# Patient Record
Sex: FEMALE | Race: WHITE | Employment: OTHER | URBAN - METROPOLITAN AREA
[De-identification: names, ages, dates, MRNs, and addresses within clinical notes are randomized per-mention and may not be internally consistent; named-entity substitution may affect disease eponyms.]

---

## 2018-07-25 ENCOUNTER — HOSPITAL ENCOUNTER (OUTPATIENT)
Dept: WOUND CARE | Age: 83
Discharge: HOME | End: 2018-07-25
Payer: MEDICARE

## 2018-07-25 VITALS
HEIGHT: 61 IN | RESPIRATION RATE: 18 BRPM | WEIGHT: 172.2 LBS | BODY MASS INDEX: 32.51 KG/M2 | DIASTOLIC BLOOD PRESSURE: 75 MMHG | TEMPERATURE: 97.7 F | HEART RATE: 112 BPM | SYSTOLIC BLOOD PRESSURE: 113 MMHG

## 2018-07-25 DIAGNOSIS — S30.810A ABRASION OF BUTTOCK, INITIAL ENCOUNTER: ICD-10-CM

## 2018-07-25 PROCEDURE — 99202 OFFICE O/P NEW SF 15 MIN: CPT

## 2018-07-25 RX ORDER — CLOTRIMAZOLE AND BETAMETHASONE DIPROPIONATE 10; .64 MG/G; MG/G
CREAM TOPICAL
Qty: 15 G | Refills: 1 | Status: SHIPPED | OUTPATIENT
Start: 2018-07-25 | End: 2020-01-01

## 2018-07-25 RX ORDER — ACETAMINOPHEN 160 MG
TABLET,DISINTEGRATING ORAL
Status: ON HOLD | COMMUNITY
End: 2020-01-01

## 2018-07-25 RX ORDER — WARFARIN SODIUM 5 MG/1
5 TABLET ORAL
Status: ON HOLD | COMMUNITY
Start: 2017-07-27 | End: 2020-01-01 | Stop reason: HOSPADM

## 2018-07-25 RX ORDER — DILTIAZEM HYDROCHLORIDE 240 MG/1
CAPSULE, COATED, EXTENDED RELEASE ORAL
Status: ON HOLD | COMMUNITY
Start: 2018-06-11 | End: 2020-01-01 | Stop reason: HOSPADM

## 2018-07-25 RX ORDER — TRAMADOL HYDROCHLORIDE 50 MG/1
TABLET ORAL
COMMUNITY
Start: 2018-06-11 | End: 2018-09-09

## 2018-07-25 RX ORDER — ALPRAZOLAM 0.5 MG/1
0.5 TABLET ORAL NIGHTLY PRN
COMMUNITY
End: 2020-01-01

## 2018-07-25 NOTE — PROGRESS NOTES
Ramya Bon[facility]   Progress Note and Procedure Note      Douglas Romero  MEDICAL RECORD NUMBER:  [id number]  AGE: 80 y.o. GENDER: female  : 1933  EPISODE DATE:  2018    Subjective:     Chief Complaint   Patient presents with    Wound Check     coccyx wound        HISTORY of PRESENT ILLNESS HPI     Douglas Romero is a 80 y.o. female who presents today  for wound/ulcer evaluation. History of Wound Context: pressure ulcer  Wound/Ulcer Pain Timing/Severity: none  Quality of pain: N/A  Severity:  0 / 10   Modifying Factors: None  Associated Signs/Symptoms: edema  Superficial abration    Ulcer Identification:  Ulcer Type: pressure  Contributing Factors: shear force    Wound: N/A        PAST MEDICAL HISTORY        Diagnosis Date    Arthritis     Asthma     History of blood transfusion     Pneumonia        PAST SURGICAL HISTORY    Past Surgical History:   Procedure Laterality Date    COLONOSCOPY      JOINT REPLACEMENT Bilateral     bilateral femur replacements       FAMILY HISTORY    Family History   Problem Relation Age of Onset    High Blood Pressure Mother     Osteoporosis Mother     Other Brother        SOCIAL HISTORY    Social History   Substance Use Topics    Smoking status: Never Smoker    Smokeless tobacco: Never Used    Alcohol use Yes      Comment: occasional       ALLERGIES    Allergies   Allergen Reactions    Carbamazepine And Analogs     Codeine        MEDICATIONS    No current outpatient prescriptions on file prior to encounter. No current facility-administered medications on file prior to encounter. REVIEW OF SYSTEMS    Pertinent items are noted in HPI.     Objective:      /75   Pulse 112   Temp 97.7 °F (36.5 °C) (Temporal)   Resp 18   Ht 5' 1\" (1.549 m)   Wt 172 lb 3.2 oz (78.1 kg)   BMI 32.54 kg/m²     Wt Readings from Last 3 Encounters:   18 172 lb 3.2 oz (78.1 kg)       PHYSICAL EXAM    Patient awake, alert and oriented x3

## 2018-08-15 ENCOUNTER — HOSPITAL ENCOUNTER (OUTPATIENT)
Dept: WOUND CARE | Age: 83
Discharge: HOME | End: 2018-08-15
Payer: MEDICARE

## 2018-08-15 VITALS
BODY MASS INDEX: 32.47 KG/M2 | RESPIRATION RATE: 16 BRPM | HEART RATE: 87 BPM | DIASTOLIC BLOOD PRESSURE: 69 MMHG | HEIGHT: 61 IN | TEMPERATURE: 98.6 F | WEIGHT: 172 LBS | SYSTOLIC BLOOD PRESSURE: 105 MMHG

## 2018-08-15 PROCEDURE — 99212 OFFICE O/P EST SF 10 MIN: CPT

## 2018-08-15 RX ORDER — CLOTRIMAZOLE AND BETAMETHASONE DIPROPIONATE 10; .64 MG/G; MG/G
CREAM TOPICAL
Qty: 15 G | Refills: 0 | Status: ON HOLD | OUTPATIENT
Start: 2018-08-15 | End: 2020-01-01 | Stop reason: HOSPADM

## 2018-08-15 NOTE — PROGRESS NOTES
[address] [facility] Physician Billing Sheet. Ash Womack  AGE: 80 y.o.    GENDER: female  : 1933  TODAY'S DATE:  8/15/2018    ICD-10 CODES  Active Hospital Problems    Diagnosis Date Noted    Abrasion of buttock [S30.810A] 2018       PHYSICIAN PROCEDURES    CPT CODE  40006      Electronically signed by Angie Prescott MD on 8/15/2018 at 3:07 PM

## 2018-08-15 NOTE — PROGRESS NOTES
Ramya Bon[facility]   Progress Note and Procedure Note      Ernesto Andino  MEDICAL RECORD NUMBER:  [id number]  AGE: 80 y.o. GENDER: female  : 1933  EPISODE DATE:  8/15/2018    Subjective:     Chief Complaint   Patient presents with    Wound Check     buttocks        HISTORY of PRESENT ILLNESS HPI     Ernesto Andino is a 80 y.o. female who presents today  for wound/ulcer evaluation. History of Wound Context: non-healing/non-surgical ulcer  Wound/Ulcer Pain Timing/Severity: none  Quality of pain: N/A  Severity:  0 / 10   Modifying Factors: None  Associated Signs/Symptoms: none  Wound healed    Ulcer Identification:  Ulcer Type: non-healing/non-surgical  Contributing Factors: none    Wound: N/A        PAST MEDICAL HISTORY        Diagnosis Date    Arthritis     Asthma     History of blood transfusion     Pneumonia        PAST SURGICAL HISTORY    Past Surgical History:   Procedure Laterality Date    COLONOSCOPY      JOINT REPLACEMENT Bilateral     bilateral femur replacements       FAMILY HISTORY    Family History   Problem Relation Age of Onset    High Blood Pressure Mother     Osteoporosis Mother     Other Brother        SOCIAL HISTORY    Social History   Substance Use Topics    Smoking status: Never Smoker    Smokeless tobacco: Never Used    Alcohol use Yes      Comment: occasional       ALLERGIES    Allergies   Allergen Reactions    Carbamazepine And Analogs     Codeine        MEDICATIONS    Current Outpatient Prescriptions on File Prior to Encounter   Medication Sig Dispense Refill    clotrimazole-betamethasone (LOTRISONE) 1-0.05 % cream Apply topically 2 times daily. 15 g 1    diltiazem (CARTIA XT) 240 MG extended release capsule TAKE ONE CAPSULE BY MOUTH EVERY DAY.  DISCONTINUE 120MG CAPSULES      fluticasone-salmeterol (ADVAIR DISKUS) 250-50 MCG/DOSE AEPB Inhale 1 puff into the lungs      traMADol (ULTRAM) 50 MG tablet TAKE 1 TABLET BY MOUTH EVERY 8 HOURS AS NEEDED Wound healed. Will be discharged          Treatment Note please see attached Discharge Instructions    In my professional opinion this patient would benefit from HBO Therapy: No    Written patient dismissal instructions given to patient and signed by patient or POA. Thank you for choosing [facility] and allowing us to help heal your wounds. If you should have any questions after your visit, please call [phone]. Discharge Instructions       Wound Clinic Physician Orders and Discharge [address] [facility]  [address]   [place], [address] [facility]  Telephone: [phone]    NAME:  Martín Garcia OF BIRTH:  7/13/1933  MEDICAL RECORD NUMBER:  [id number]  DATE:  8/15/2018    Congratulations!! You have completed your treatment. 1. Return to your Primary Care Physician for all your health issues. 2. Resume your ordinary activities as tolerated. 3. Take your medications as prescribed by your primary care physician. 4. Check your skin daily for cracks, bruises, sores, or dryness. Use a moisturizer as needed. 5. Clean and dry your skin, using mild soap Dove and warm water (not hot). 6. Avoid alcohol and caffeine and do not smoke. 7. Maintain a nutritious diet. 8. Avoid pressure on your wound site. Keep your legs elevated above the level of the heart whenever possible. 9. Apply Lotrisone cream to affected area 2 times a day until healed. THANK YOU FOR ALLOWING US TO SERVE YOU.  PLEASE CALL IF YOU DEVELOP ANOTHER WOUND. [phone]                                Electronically signed by Haley Coon MD on 8/15/2018 at 2:57 PM

## 2019-09-18 ENCOUNTER — APPOINTMENT (OUTPATIENT)
Dept: GENERAL RADIOLOGY | Age: 84
End: 2019-09-18
Payer: MEDICARE

## 2019-09-18 ENCOUNTER — HOSPITAL ENCOUNTER (EMERGENCY)
Age: 84
Discharge: HOME | End: 2019-09-18
Payer: MEDICARE

## 2019-09-18 VITALS
DIASTOLIC BLOOD PRESSURE: 68 MMHG | HEIGHT: 61 IN | HEART RATE: 98 BPM | RESPIRATION RATE: 18 BRPM | WEIGHT: 156 LBS | BODY MASS INDEX: 29.45 KG/M2 | SYSTOLIC BLOOD PRESSURE: 124 MMHG | OXYGEN SATURATION: 100 % | TEMPERATURE: 98.6 F

## 2019-09-18 DIAGNOSIS — R09.89 PULMONARY VASCULAR CONGESTION: Primary | ICD-10-CM

## 2019-09-18 DIAGNOSIS — M54.50 ACUTE BILATERAL LOW BACK PAIN WITHOUT SCIATICA: ICD-10-CM

## 2019-09-18 LAB
ALBUMIN SERPL-MCNC: 3.6 G/DL (ref 3.5–4.6)
ALP BLD-CCNC: 83 U/L (ref 40–130)
ALT SERPL-CCNC: 6 U/L (ref 0–33)
ANION GAP SERPL CALCULATED.3IONS-SCNC: 15 MEQ/L (ref 9–15)
APTT: 43 SEC (ref 24.4–36.8)
AST SERPL-CCNC: 19 U/L (ref 0–35)
BACTERIA: ABNORMAL /HPF
BASOPHILS ABSOLUTE: 0 K/UL (ref 0–0.2)
BASOPHILS RELATIVE PERCENT: 0.8 %
BILIRUB SERPL-MCNC: 0.7 MG/DL (ref 0.2–0.7)
BILIRUBIN URINE: NEGATIVE
BLOOD, URINE: NEGATIVE
BUN BLDV-MCNC: 15 MG/DL (ref 8–23)
CALCIUM SERPL-MCNC: 9 MG/DL (ref 8.5–9.9)
CHLORIDE BLD-SCNC: 101 MEQ/L (ref 95–107)
CLARITY: ABNORMAL
CO2: 25 MEQ/L (ref 20–31)
COLOR: ABNORMAL
CREAT SERPL-MCNC: 0.71 MG/DL (ref 0.5–0.9)
CRYSTALS, UA: ABNORMAL
EKG ATRIAL RATE: 96 BPM
EKG P-R INTERVAL: 190 MS
EKG Q-T INTERVAL: 300 MS
EKG QRS DURATION: 74 MS
EKG QTC CALCULATION (BAZETT): 379 MS
EKG R AXIS: -21 DEGREES
EKG T AXIS: -3 DEGREES
EKG VENTRICULAR RATE: 96 BPM
EOSINOPHILS ABSOLUTE: 0.1 K/UL (ref 0–0.7)
EOSINOPHILS RELATIVE PERCENT: 1.1 %
EPITHELIAL CELLS, UA: ABNORMAL /HPF (ref 0–5)
GFR AFRICAN AMERICAN: >60
GFR NON-AFRICAN AMERICAN: >60
GLOBULIN: 4.5 G/DL (ref 2.3–3.5)
GLUCOSE BLD-MCNC: 96 MG/DL (ref 70–99)
GLUCOSE URINE: NEGATIVE MG/DL
HCT VFR BLD CALC: 41 % (ref 37–47)
HEMOGLOBIN: 13.7 G/DL (ref 12–16)
INR BLD: 3.1
KETONES, URINE: ABNORMAL MG/DL
LEUKOCYTE ESTERASE, URINE: ABNORMAL
LYMPHOCYTES ABSOLUTE: 0.9 K/UL (ref 1–4.8)
LYMPHOCYTES RELATIVE PERCENT: 14.4 %
MCH RBC QN AUTO: 31 PG (ref 27–31.3)
MCHC RBC AUTO-ENTMCNC: 33.3 % (ref 33–37)
MCV RBC AUTO: 93.1 FL (ref 82–100)
MONOCYTES ABSOLUTE: 0.7 K/UL (ref 0.2–0.8)
MONOCYTES RELATIVE PERCENT: 11.7 %
MUCUS: PRESENT
NEUTROPHILS ABSOLUTE: 4.3 K/UL (ref 1.4–6.5)
NEUTROPHILS RELATIVE PERCENT: 72 %
NITRITE, URINE: POSITIVE
PDW BLD-RTO: 14.3 % (ref 11.5–14.5)
PH UA: 5.5 (ref 5–9)
PLATELET # BLD: 235 K/UL (ref 130–400)
POTASSIUM SERPL-SCNC: 4.1 MEQ/L (ref 3.4–4.9)
PRO-BNP: 784 PG/ML
PROTEIN UA: 30 MG/DL
PROTHROMBIN TIME: 33.1 SEC (ref 12.3–14.9)
RBC # BLD: 4.4 M/UL (ref 4.2–5.4)
REASON FOR REJECTION: NORMAL
REJECTED TEST: NORMAL
SODIUM BLD-SCNC: 141 MEQ/L (ref 135–144)
SPECIFIC GRAVITY UA: 1.03 (ref 1–1.03)
TOTAL CK: 37 U/L (ref 0–170)
TOTAL PROTEIN: 8.1 G/DL (ref 6.3–8)
TROPONIN: <0.01 NG/ML (ref 0–0.01)
URINE REFLEX TO CULTURE: YES
UROBILINOGEN, URINE: 1 E.U./DL
WBC # BLD: 6 K/UL (ref 4.8–10.8)
WBC UA: ABNORMAL /HPF (ref 0–5)

## 2019-09-18 PROCEDURE — 99283 EMERGENCY DEPT VISIT LOW MDM: CPT

## 2019-09-18 PROCEDURE — 82550 ASSAY OF CK (CPK): CPT

## 2019-09-18 PROCEDURE — 96374 THER/PROPH/DIAG INJ IV PUSH: CPT

## 2019-09-18 PROCEDURE — 93005 ELECTROCARDIOGRAM TRACING: CPT | Performed by: PHYSICIAN ASSISTANT

## 2019-09-18 PROCEDURE — 84484 ASSAY OF TROPONIN QUANT: CPT

## 2019-09-18 PROCEDURE — 71045 X-RAY EXAM CHEST 1 VIEW: CPT

## 2019-09-18 PROCEDURE — 36415 COLL VENOUS BLD VENIPUNCTURE: CPT

## 2019-09-18 PROCEDURE — 81001 URINALYSIS AUTO W/SCOPE: CPT

## 2019-09-18 PROCEDURE — 80053 COMPREHEN METABOLIC PANEL: CPT

## 2019-09-18 PROCEDURE — 87086 URINE CULTURE/COLONY COUNT: CPT

## 2019-09-18 PROCEDURE — 87186 SC STD MICRODIL/AGAR DIL: CPT

## 2019-09-18 PROCEDURE — 85025 COMPLETE CBC W/AUTO DIFF WBC: CPT

## 2019-09-18 PROCEDURE — 85730 THROMBOPLASTIN TIME PARTIAL: CPT

## 2019-09-18 PROCEDURE — 6360000002 HC RX W HCPCS: Performed by: PHYSICIAN ASSISTANT

## 2019-09-18 PROCEDURE — 85610 PROTHROMBIN TIME: CPT

## 2019-09-18 PROCEDURE — 83880 ASSAY OF NATRIURETIC PEPTIDE: CPT

## 2019-09-18 PROCEDURE — 87077 CULTURE AEROBIC IDENTIFY: CPT

## 2019-09-18 RX ORDER — FUROSEMIDE 10 MG/ML
40 INJECTION INTRAMUSCULAR; INTRAVENOUS ONCE
Status: COMPLETED | OUTPATIENT
Start: 2019-09-18 | End: 2019-09-18

## 2019-09-18 RX ORDER — FUROSEMIDE 20 MG/1
20 TABLET ORAL DAILY
Qty: 4 TABLET | Refills: 0 | Status: ON HOLD | OUTPATIENT
Start: 2019-09-18 | End: 2020-01-01 | Stop reason: HOSPADM

## 2019-09-18 RX ORDER — HYDROCODONE BITARTRATE AND ACETAMINOPHEN 5; 325 MG/1; MG/1
1 TABLET ORAL EVERY 6 HOURS PRN
Qty: 10 TABLET | Refills: 0 | Status: SHIPPED | OUTPATIENT
Start: 2019-09-18 | End: 2019-09-21

## 2019-09-18 RX ADMIN — FUROSEMIDE 40 MG: 10 INJECTION, SOLUTION INTRAMUSCULAR; INTRAVENOUS at 16:33

## 2019-09-18 ASSESSMENT — PAIN SCALES - GENERAL: PAINLEVEL_OUTOF10: 8

## 2019-09-18 ASSESSMENT — ENCOUNTER SYMPTOMS
SORE THROAT: 0
NAUSEA: 0
RHINORRHEA: 0
ABDOMINAL DISTENTION: 0
SHORTNESS OF BREATH: 1
EYE DISCHARGE: 0
STRIDOR: 0
DIARRHEA: 0
CHOKING: 0
CONSTIPATION: 0
ABDOMINAL PAIN: 0
COUGH: 0
COLOR CHANGE: 0
VOMITING: 0

## 2019-09-18 ASSESSMENT — PAIN DESCRIPTION - LOCATION: LOCATION: BACK

## 2019-09-18 ASSESSMENT — PAIN DESCRIPTION - PAIN TYPE: TYPE: ACUTE PAIN

## 2019-09-18 ASSESSMENT — PAIN DESCRIPTION - ONSET: ONSET: ON-GOING

## 2019-09-18 ASSESSMENT — PAIN DESCRIPTION - DESCRIPTORS: DESCRIPTORS: ACHING;CRAMPING

## 2019-09-18 ASSESSMENT — PAIN DESCRIPTION - FREQUENCY: FREQUENCY: CONTINUOUS

## 2019-09-18 ASSESSMENT — PAIN DESCRIPTION - PROGRESSION: CLINICAL_PROGRESSION: GRADUALLY WORSENING

## 2019-09-18 ASSESSMENT — PAIN DESCRIPTION - ORIENTATION: ORIENTATION: RIGHT;LEFT

## 2019-09-18 NOTE — ED TRIAGE NOTES
Patient arrived from home with complaint of back pain x1 weeks. Patient A&OX3. Skin pink, warm, and dry. Patient complaint of sob with amb. No distress noted. msp intact.

## 2019-09-20 PROCEDURE — 93010 ELECTROCARDIOGRAM REPORT: CPT | Performed by: INTERNAL MEDICINE

## 2019-09-21 LAB
ORGANISM: ABNORMAL
URINE CULTURE, ROUTINE: ABNORMAL

## 2019-10-07 LAB
LV EF: 66 %
LVEF MODALITY: NORMAL

## 2020-01-01 ENCOUNTER — APPOINTMENT (OUTPATIENT)
Dept: CT IMAGING | Age: 85
DRG: 308 | End: 2020-01-01
Payer: MEDICARE

## 2020-01-01 ENCOUNTER — ANESTHESIA EVENT (OUTPATIENT)
Dept: ENDOSCOPY | Age: 85
DRG: 308 | End: 2020-01-01
Payer: MEDICARE

## 2020-01-01 ENCOUNTER — APPOINTMENT (OUTPATIENT)
Dept: GENERAL RADIOLOGY | Age: 85
DRG: 308 | End: 2020-01-01
Payer: MEDICARE

## 2020-01-01 ENCOUNTER — APPOINTMENT (OUTPATIENT)
Dept: ULTRASOUND IMAGING | Age: 85
DRG: 308 | End: 2020-01-01
Payer: MEDICARE

## 2020-01-01 ENCOUNTER — HOSPITAL ENCOUNTER (EMERGENCY)
Age: 85
Discharge: HOME | End: 2020-02-17
Payer: MEDICARE

## 2020-01-01 ENCOUNTER — APPOINTMENT (OUTPATIENT)
Dept: MRI IMAGING | Age: 85
DRG: 308 | End: 2020-01-01
Payer: MEDICARE

## 2020-01-01 ENCOUNTER — APPOINTMENT (OUTPATIENT)
Dept: CARDIAC CATH/INVASIVE PROCEDURES | Age: 85
DRG: 308 | End: 2020-01-01
Payer: MEDICARE

## 2020-01-01 ENCOUNTER — HOSPITAL ENCOUNTER (INPATIENT)
Age: 85
LOS: 9 days | Discharge: HOSPICE | DRG: 308 | End: 2020-12-01
Attending: INTERNAL MEDICINE | Admitting: INTERNAL MEDICINE
Payer: MEDICARE

## 2020-01-01 ENCOUNTER — APPOINTMENT (OUTPATIENT)
Dept: CT IMAGING | Age: 85
DRG: 871 | End: 2020-01-01
Payer: MEDICARE

## 2020-01-01 ENCOUNTER — APPOINTMENT (OUTPATIENT)
Dept: GENERAL RADIOLOGY | Age: 85
DRG: 871 | End: 2020-01-01
Payer: MEDICARE

## 2020-01-01 ENCOUNTER — APPOINTMENT (OUTPATIENT)
Dept: ULTRASOUND IMAGING | Age: 85
DRG: 871 | End: 2020-01-01
Payer: MEDICARE

## 2020-01-01 ENCOUNTER — ANESTHESIA (OUTPATIENT)
Dept: ENDOSCOPY | Age: 85
DRG: 308 | End: 2020-01-01
Payer: MEDICARE

## 2020-01-01 ENCOUNTER — HOSPITAL ENCOUNTER (INPATIENT)
Age: 85
LOS: 3 days | Discharge: HOME | DRG: 871 | End: 2020-01-15
Attending: FAMILY MEDICINE | Admitting: INTERNAL MEDICINE
Payer: MEDICARE

## 2020-01-01 VITALS
RESPIRATION RATE: 16 BRPM | OXYGEN SATURATION: 95 % | TEMPERATURE: 97.9 F | BODY MASS INDEX: 28.52 KG/M2 | HEART RATE: 101 BPM | WEIGHT: 155 LBS | HEIGHT: 62 IN | DIASTOLIC BLOOD PRESSURE: 60 MMHG | SYSTOLIC BLOOD PRESSURE: 91 MMHG

## 2020-01-01 VITALS
DIASTOLIC BLOOD PRESSURE: 58 MMHG | HEIGHT: 64 IN | WEIGHT: 124.6 LBS | SYSTOLIC BLOOD PRESSURE: 92 MMHG | TEMPERATURE: 97.4 F | BODY MASS INDEX: 21.27 KG/M2 | HEART RATE: 75 BPM | RESPIRATION RATE: 14 BRPM | OXYGEN SATURATION: 95 %

## 2020-01-01 VITALS
WEIGHT: 163 LBS | SYSTOLIC BLOOD PRESSURE: 116 MMHG | TEMPERATURE: 98.1 F | HEIGHT: 61 IN | BODY MASS INDEX: 30.78 KG/M2 | DIASTOLIC BLOOD PRESSURE: 84 MMHG | HEART RATE: 45 BPM | RESPIRATION RATE: 18 BRPM | OXYGEN SATURATION: 90 %

## 2020-01-01 LAB
ABO/RH: NORMAL
ALBUMIN SERPL-MCNC: 2.6 G/DL (ref 3.5–4.6)
ALBUMIN SERPL-MCNC: 2.7 G/DL (ref 3.5–4.6)
ALBUMIN SERPL-MCNC: 2.7 G/DL (ref 3.5–4.6)
ALBUMIN SERPL-MCNC: 2.8 G/DL (ref 3.5–4.6)
ALBUMIN SERPL-MCNC: 3 G/DL (ref 3.5–4.6)
ALBUMIN SERPL-MCNC: 3 G/DL (ref 3.5–4.6)
ALBUMIN SERPL-MCNC: 3.1 G/DL (ref 3.5–4.6)
ALBUMIN SERPL-MCNC: 3.7 G/DL (ref 3.5–4.6)
ALBUMIN SERPL-MCNC: 4.2 G/DL (ref 3.5–4.6)
ALBUMIN SERPL-MCNC: 4.3 G/DL (ref 3.5–4.6)
ALBUMIN SERPL-MCNC: 4.7 G/DL (ref 3.5–4.6)
ALBUMIN SERPL-MCNC: 4.9 G/DL (ref 3.5–4.6)
ALP BLD-CCNC: 119 U/L (ref 40–130)
ALP BLD-CCNC: 122 U/L (ref 40–130)
ALP BLD-CCNC: 124 U/L (ref 40–130)
ALP BLD-CCNC: 126 U/L (ref 40–130)
ALP BLD-CCNC: 153 U/L (ref 40–130)
ALP BLD-CCNC: 229 U/L (ref 40–130)
ALP BLD-CCNC: 50 U/L (ref 40–130)
ALP BLD-CCNC: 58 U/L (ref 40–130)
ALP BLD-CCNC: 64 U/L (ref 40–130)
ALP BLD-CCNC: 66 U/L (ref 40–130)
ALP BLD-CCNC: 73 U/L (ref 40–130)
ALP BLD-CCNC: 96 U/L (ref 40–130)
ALT SERPL-CCNC: 101 U/L (ref 0–33)
ALT SERPL-CCNC: 128 U/L (ref 0–33)
ALT SERPL-CCNC: 135 U/L (ref 0–33)
ALT SERPL-CCNC: 15 U/L (ref 0–33)
ALT SERPL-CCNC: 23 U/L (ref 0–33)
ALT SERPL-CCNC: 39 U/L (ref 0–33)
ALT SERPL-CCNC: 57 U/L (ref 0–33)
ALT SERPL-CCNC: 57 U/L (ref 0–33)
ALT SERPL-CCNC: 68 U/L (ref 0–33)
ALT SERPL-CCNC: 8 U/L (ref 0–33)
ALT SERPL-CCNC: 9 U/L (ref 0–33)
ALT SERPL-CCNC: <5 U/L (ref 0–33)
ANION GAP SERPL CALCULATED.3IONS-SCNC: 10 MEQ/L (ref 9–15)
ANION GAP SERPL CALCULATED.3IONS-SCNC: 11 MEQ/L (ref 9–15)
ANION GAP SERPL CALCULATED.3IONS-SCNC: 12 MEQ/L (ref 9–15)
ANION GAP SERPL CALCULATED.3IONS-SCNC: 12 MEQ/L (ref 9–15)
ANION GAP SERPL CALCULATED.3IONS-SCNC: 13 MEQ/L (ref 9–15)
ANION GAP SERPL CALCULATED.3IONS-SCNC: 15 MEQ/L (ref 9–15)
ANION GAP SERPL CALCULATED.3IONS-SCNC: 16 MEQ/L (ref 9–15)
ANION GAP SERPL CALCULATED.3IONS-SCNC: 7 MEQ/L (ref 9–15)
ANION GAP SERPL CALCULATED.3IONS-SCNC: 7 MEQ/L (ref 9–15)
ANION GAP SERPL CALCULATED.3IONS-SCNC: 8 MEQ/L (ref 9–15)
ANION GAP SERPL CALCULATED.3IONS-SCNC: 9 MEQ/L (ref 9–15)
ANISOCYTOSIS: ABNORMAL
ANISOCYTOSIS: ABNORMAL
ANTIBODY SCREEN: NORMAL
APTT: 49 SEC (ref 24.4–36.8)
AST SERPL-CCNC: 11 U/L (ref 0–35)
AST SERPL-CCNC: 13 U/L (ref 0–35)
AST SERPL-CCNC: 154 U/L (ref 0–35)
AST SERPL-CCNC: 17 U/L (ref 0–35)
AST SERPL-CCNC: 198 U/L (ref 0–35)
AST SERPL-CCNC: 21 U/L (ref 0–35)
AST SERPL-CCNC: 285 U/L (ref 0–35)
AST SERPL-CCNC: 29 U/L (ref 0–35)
AST SERPL-CCNC: 37 U/L (ref 0–35)
AST SERPL-CCNC: 598 U/L (ref 0–35)
AST SERPL-CCNC: 64 U/L (ref 0–35)
AST SERPL-CCNC: 79 U/L (ref 0–35)
BACTERIA: ABNORMAL /HPF
BACTERIA: NEGATIVE /HPF
BANDED NEUTROPHILS RELATIVE PERCENT: 11 % (ref 5–11)
BASE EXCESS ARTERIAL: 1 (ref -3–3)
BASOPHILS ABSOLUTE: 0 K/UL (ref 0–0.2)
BASOPHILS ABSOLUTE: 0.1 K/UL (ref 0–0.2)
BASOPHILS ABSOLUTE: 0.1 K/UL (ref 0–0.2)
BASOPHILS RELATIVE PERCENT: 0 %
BASOPHILS RELATIVE PERCENT: 0.1 %
BASOPHILS RELATIVE PERCENT: 0.2 %
BASOPHILS RELATIVE PERCENT: 0.5 %
BASOPHILS RELATIVE PERCENT: 0.6 %
BASOPHILS RELATIVE PERCENT: 0.9 %
BASOPHILS RELATIVE PERCENT: 1 %
BILIRUB SERPL-MCNC: 0.6 MG/DL (ref 0.2–0.7)
BILIRUB SERPL-MCNC: 0.7 MG/DL (ref 0.2–0.7)
BILIRUB SERPL-MCNC: 0.9 MG/DL (ref 0.2–0.7)
BILIRUB SERPL-MCNC: 1 MG/DL (ref 0.2–0.7)
BILIRUB SERPL-MCNC: 1 MG/DL (ref 0.2–0.7)
BILIRUB SERPL-MCNC: 1.4 MG/DL (ref 0.2–0.7)
BILIRUB SERPL-MCNC: 1.5 MG/DL (ref 0.2–0.7)
BILIRUB SERPL-MCNC: 1.5 MG/DL (ref 0.2–0.7)
BILIRUB SERPL-MCNC: 1.6 MG/DL (ref 0.2–0.7)
BILIRUB SERPL-MCNC: 1.7 MG/DL (ref 0.2–0.7)
BILIRUB SERPL-MCNC: 2.9 MG/DL (ref 0.2–0.7)
BILIRUB SERPL-MCNC: 4.9 MG/DL (ref 0.2–0.7)
BILIRUBIN DIRECT: 0.4 MG/DL (ref 0–0.4)
BILIRUBIN DIRECT: 0.6 MG/DL (ref 0–0.4)
BILIRUBIN URINE: ABNORMAL
BILIRUBIN URINE: NEGATIVE
BILIRUBIN, INDIRECT: 0.2 MG/DL (ref 0–0.6)
BILIRUBIN, INDIRECT: 0.3 MG/DL (ref 0–0.6)
BLOOD CULTURE, ROUTINE: NORMAL
BLOOD, URINE: ABNORMAL
BLOOD, URINE: NEGATIVE
BUN BLDV-MCNC: 15 MG/DL (ref 8–23)
BUN BLDV-MCNC: 15 MG/DL (ref 8–23)
BUN BLDV-MCNC: 16 MG/DL (ref 8–23)
BUN BLDV-MCNC: 17 MG/DL (ref 8–23)
BUN BLDV-MCNC: 20 MG/DL (ref 8–23)
BUN BLDV-MCNC: 22 MG/DL (ref 8–23)
BUN BLDV-MCNC: 22 MG/DL (ref 8–23)
BUN BLDV-MCNC: 24 MG/DL (ref 8–23)
BUN BLDV-MCNC: 24 MG/DL (ref 8–23)
BUN BLDV-MCNC: 27 MG/DL (ref 8–23)
BUN BLDV-MCNC: 28 MG/DL (ref 8–23)
BUN BLDV-MCNC: 28 MG/DL (ref 8–23)
BUN BLDV-MCNC: 44 MG/DL (ref 8–23)
C-REACTIVE PROTEIN, HIGH SENSITIVITY: 108.6 MG/L (ref 0–5)
C-REACTIVE PROTEIN, HIGH SENSITIVITY: 203.9 MG/L (ref 0–5)
C-REACTIVE PROTEIN, HIGH SENSITIVITY: 218.7 MG/L (ref 0–5)
C-REACTIVE PROTEIN, HIGH SENSITIVITY: 40.8 MG/L (ref 0–5)
CALCIUM IONIZED: 1.34 MMOL/L (ref 1.12–1.32)
CALCIUM SERPL-MCNC: 10.6 MG/DL (ref 8.5–9.9)
CALCIUM SERPL-MCNC: 8.2 MG/DL (ref 8.5–9.9)
CALCIUM SERPL-MCNC: 8.4 MG/DL (ref 8.5–9.9)
CALCIUM SERPL-MCNC: 8.5 MG/DL (ref 8.5–9.9)
CALCIUM SERPL-MCNC: 8.5 MG/DL (ref 8.5–9.9)
CALCIUM SERPL-MCNC: 8.6 MG/DL (ref 8.5–9.9)
CALCIUM SERPL-MCNC: 8.7 MG/DL (ref 8.5–9.9)
CALCIUM SERPL-MCNC: 8.9 MG/DL (ref 8.5–9.9)
CALCIUM SERPL-MCNC: 8.9 MG/DL (ref 8.5–9.9)
CALCIUM SERPL-MCNC: 9.1 MG/DL (ref 8.5–9.9)
CALCIUM SERPL-MCNC: 9.2 MG/DL (ref 8.5–9.9)
CALCIUM SERPL-MCNC: 9.4 MG/DL (ref 8.5–9.9)
CALCIUM SERPL-MCNC: 9.8 MG/DL (ref 8.5–9.9)
CHLORIDE BLD-SCNC: 100 MEQ/L (ref 95–107)
CHLORIDE BLD-SCNC: 100 MEQ/L (ref 95–107)
CHLORIDE BLD-SCNC: 103 MEQ/L (ref 95–107)
CHLORIDE BLD-SCNC: 103 MEQ/L (ref 95–107)
CHLORIDE BLD-SCNC: 106 MEQ/L (ref 95–107)
CHLORIDE BLD-SCNC: 107 MEQ/L (ref 95–107)
CHLORIDE BLD-SCNC: 108 MEQ/L (ref 95–107)
CHLORIDE BLD-SCNC: 108 MEQ/L (ref 95–107)
CHLORIDE BLD-SCNC: 109 MEQ/L (ref 95–107)
CHLORIDE BLD-SCNC: 111 MEQ/L (ref 95–107)
CHLORIDE BLD-SCNC: 99 MEQ/L (ref 95–107)
CLARITY: ABNORMAL
CLARITY: CLEAR
CO2: 20 MEQ/L (ref 20–31)
CO2: 22 MEQ/L (ref 20–31)
CO2: 23 MEQ/L (ref 20–31)
CO2: 23 MEQ/L (ref 20–31)
CO2: 24 MEQ/L (ref 20–31)
CO2: 24 MEQ/L (ref 20–31)
CO2: 26 MEQ/L (ref 20–31)
CO2: 26 MEQ/L (ref 20–31)
CO2: 27 MEQ/L (ref 20–31)
CO2: 29 MEQ/L (ref 20–31)
COLOR: ABNORMAL
COLOR: YELLOW
CREAT SERPL-MCNC: 0.55 MG/DL (ref 0.5–0.9)
CREAT SERPL-MCNC: 0.59 MG/DL (ref 0.5–0.9)
CREAT SERPL-MCNC: 0.64 MG/DL (ref 0.5–0.9)
CREAT SERPL-MCNC: 0.67 MG/DL (ref 0.5–0.9)
CREAT SERPL-MCNC: 0.71 MG/DL (ref 0.5–0.9)
CREAT SERPL-MCNC: 0.72 MG/DL (ref 0.5–0.9)
CREAT SERPL-MCNC: 0.77 MG/DL (ref 0.5–0.9)
CREAT SERPL-MCNC: 0.78 MG/DL (ref 0.5–0.9)
CREAT SERPL-MCNC: 0.78 MG/DL (ref 0.5–0.9)
CREAT SERPL-MCNC: 0.8 MG/DL (ref 0.5–0.9)
CREAT SERPL-MCNC: 0.81 MG/DL (ref 0.5–0.9)
CREAT SERPL-MCNC: 0.92 MG/DL (ref 0.5–0.9)
CREAT SERPL-MCNC: 1.05 MG/DL (ref 0.5–0.9)
CULTURE, BLOOD 2: NORMAL
EKG ATRIAL RATE: 105 BPM
EKG ATRIAL RATE: 105 BPM
EKG ATRIAL RATE: 121 BPM
EKG ATRIAL RATE: 234 BPM
EKG ATRIAL RATE: 394 BPM
EKG ATRIAL RATE: 468 BPM
EKG ATRIAL RATE: 50 BPM
EKG ATRIAL RATE: 83 BPM
EKG ATRIAL RATE: 90 BPM
EKG P AXIS: 30 DEGREES
EKG P AXIS: 32 DEGREES
EKG P AXIS: 62 DEGREES
EKG P-R INTERVAL: 142 MS
EKG P-R INTERVAL: 190 MS
EKG P-R INTERVAL: 196 MS
EKG P-R INTERVAL: 210 MS
EKG Q-T INTERVAL: 246 MS
EKG Q-T INTERVAL: 294 MS
EKG Q-T INTERVAL: 304 MS
EKG Q-T INTERVAL: 350 MS
EKG Q-T INTERVAL: 356 MS
EKG Q-T INTERVAL: 362 MS
EKG Q-T INTERVAL: 368 MS
EKG Q-T INTERVAL: 398 MS
EKG Q-T INTERVAL: 398 MS
EKG QRS DURATION: 72 MS
EKG QRS DURATION: 74 MS
EKG QRS DURATION: 74 MS
EKG QRS DURATION: 78 MS
EKG QRS DURATION: 80 MS
EKG QRS DURATION: 82 MS
EKG QRS DURATION: 94 MS
EKG QTC CALCULATION (BAZETT): 349 MS
EKG QTC CALCULATION (BAZETT): 405 MS
EKG QTC CALCULATION (BAZETT): 412 MS
EKG QTC CALCULATION (BAZETT): 425 MS
EKG QTC CALCULATION (BAZETT): 442 MS
EKG QTC CALCULATION (BAZETT): 450 MS
EKG QTC CALCULATION (BAZETT): 459 MS
EKG QTC CALCULATION (BAZETT): 497 MS
EKG QTC CALCULATION (BAZETT): 526 MS
EKG R AXIS: -17 DEGREES
EKG R AXIS: -18 DEGREES
EKG R AXIS: -18 DEGREES
EKG R AXIS: -19 DEGREES
EKG R AXIS: -20 DEGREES
EKG R AXIS: -21 DEGREES
EKG R AXIS: -23 DEGREES
EKG T AXIS: -12 DEGREES
EKG T AXIS: -24 DEGREES
EKG T AXIS: -57 DEGREES
EKG T AXIS: -76 DEGREES
EKG T AXIS: 179 DEGREES
EKG T AXIS: 180 DEGREES
EKG T AXIS: 183 DEGREES
EKG T AXIS: 195 DEGREES
EKG T AXIS: 2 DEGREES
EKG VENTRICULAR RATE: 100 BPM
EKG VENTRICULAR RATE: 105 BPM
EKG VENTRICULAR RATE: 107 BPM
EKG VENTRICULAR RATE: 118 BPM
EKG VENTRICULAR RATE: 121 BPM
EKG VENTRICULAR RATE: 83 BPM
EKG VENTRICULAR RATE: 90 BPM
EKG VENTRICULAR RATE: 94 BPM
EKG VENTRICULAR RATE: 96 BPM
EOSINOPHILS ABSOLUTE: 0 K/UL (ref 0–0.7)
EOSINOPHILS ABSOLUTE: 0.1 K/UL (ref 0–0.7)
EOSINOPHILS RELATIVE PERCENT: 0 %
EOSINOPHILS RELATIVE PERCENT: 0.1 %
EOSINOPHILS RELATIVE PERCENT: 0.1 %
EOSINOPHILS RELATIVE PERCENT: 0.2 %
EOSINOPHILS RELATIVE PERCENT: 0.3 %
EOSINOPHILS RELATIVE PERCENT: 0.6 %
EOSINOPHILS RELATIVE PERCENT: 1.1 %
EOSINOPHILS RELATIVE PERCENT: 1.2 %
EPITHELIAL CELLS, UA: ABNORMAL /HPF (ref 0–5)
EPITHELIAL CELLS, UA: NORMAL /HPF (ref 0–5)
GFR AFRICAN AMERICAN: 59.9
GFR AFRICAN AMERICAN: >60
GFR NON-AFRICAN AMERICAN: 49.5
GFR NON-AFRICAN AMERICAN: 57.7
GFR NON-AFRICAN AMERICAN: 59
GFR NON-AFRICAN AMERICAN: >60
GLOBULIN: 1.8 G/DL (ref 2.3–3.5)
GLOBULIN: 2 G/DL (ref 2.3–3.5)
GLOBULIN: 2.1 G/DL (ref 2.3–3.5)
GLOBULIN: 2.4 G/DL (ref 2.3–3.5)
GLOBULIN: 2.5 G/DL (ref 2.3–3.5)
GLOBULIN: 3.3 G/DL (ref 2.3–3.5)
GLOBULIN: 3.3 G/DL (ref 2.3–3.5)
GLOBULIN: 3.4 G/DL (ref 2.3–3.5)
GLOBULIN: 3.4 G/DL (ref 2.3–3.5)
GLOBULIN: 3.5 G/DL (ref 2.3–3.5)
GLUCOSE BLD-MCNC: 101 MG/DL (ref 70–99)
GLUCOSE BLD-MCNC: 124 MG/DL (ref 70–99)
GLUCOSE BLD-MCNC: 148 MG/DL (ref 70–99)
GLUCOSE BLD-MCNC: 62 MG/DL (ref 70–99)
GLUCOSE BLD-MCNC: 71 MG/DL (ref 70–99)
GLUCOSE BLD-MCNC: 76 MG/DL (ref 70–99)
GLUCOSE BLD-MCNC: 76 MG/DL (ref 70–99)
GLUCOSE BLD-MCNC: 81 MG/DL (ref 70–99)
GLUCOSE BLD-MCNC: 81 MG/DL (ref 70–99)
GLUCOSE BLD-MCNC: 88 MG/DL (ref 70–99)
GLUCOSE BLD-MCNC: 88 MG/DL (ref 70–99)
GLUCOSE BLD-MCNC: 90 MG/DL (ref 70–99)
GLUCOSE BLD-MCNC: 92 MG/DL (ref 70–99)
GLUCOSE BLD-MCNC: 93 MG/DL (ref 60–115)
GLUCOSE URINE: NEGATIVE MG/DL
GLUCOSE URINE: NEGATIVE MG/DL
HCO3 ARTERIAL: 28 MMOL/L (ref 21–29)
HCT VFR BLD CALC: 33.5 % (ref 37–47)
HCT VFR BLD CALC: 34 % (ref 37–47)
HCT VFR BLD CALC: 34.6 % (ref 37–47)
HCT VFR BLD CALC: 34.6 % (ref 37–47)
HCT VFR BLD CALC: 35.4 % (ref 37–47)
HCT VFR BLD CALC: 38.3 % (ref 37–47)
HCT VFR BLD CALC: 38.8 % (ref 37–47)
HCT VFR BLD CALC: 40.5 % (ref 37–47)
HCT VFR BLD CALC: 41.8 % (ref 37–47)
HCT VFR BLD CALC: 42 % (ref 37–47)
HCT VFR BLD CALC: 42.5 % (ref 37–47)
HCT VFR BLD CALC: 43.1 % (ref 37–47)
HCT VFR BLD CALC: 48.9 % (ref 37–47)
HEMOGLOBIN: 10.8 G/DL (ref 12–16)
HEMOGLOBIN: 11 G/DL (ref 12–16)
HEMOGLOBIN: 11.1 G/DL (ref 12–16)
HEMOGLOBIN: 11.1 G/DL (ref 12–16)
HEMOGLOBIN: 11.2 G/DL (ref 12–16)
HEMOGLOBIN: 12.4 GM/DL (ref 12–16)
HEMOGLOBIN: 12.6 G/DL (ref 12–16)
HEMOGLOBIN: 12.8 G/DL (ref 12–16)
HEMOGLOBIN: 13.4 G/DL (ref 12–16)
HEMOGLOBIN: 13.4 G/DL (ref 12–16)
HEMOGLOBIN: 13.8 G/DL (ref 12–16)
HEMOGLOBIN: 14.1 G/DL (ref 12–16)
HEMOGLOBIN: 14.1 G/DL (ref 12–16)
HEMOGLOBIN: 15.8 G/DL (ref 12–16)
HYALINE CASTS: ABNORMAL /HPF (ref 0–5)
HYALINE CASTS: NORMAL /HPF (ref 0–5)
HYPOCHROMIA: ABNORMAL
INFLUENZA A BY PCR: NEGATIVE
INFLUENZA B BY PCR: NEGATIVE
INR BLD: 2.4
INR BLD: 2.6
INR BLD: 2.7
INR BLD: 2.9
INR BLD: 3.3
INR BLD: 3.5
INR BLD: 3.7
INR BLD: 4
INR BLD: 4.1
INR BLD: 4.2
INR BLD: 4.9
INR BLD: 5.5
INR BLD: 5.8
INR BLD: >10
INR BLD: >10
KETONES, URINE: NEGATIVE MG/DL
KETONES, URINE: NEGATIVE MG/DL
LACTATE: 0.94 MMOL/L (ref 0.4–2)
LACTIC ACID: 1.5 MMOL/L (ref 0.5–2.2)
LACTIC ACID: 1.9 MMOL/L (ref 0.5–2.2)
LACTIC ACID: 2.8 MMOL/L (ref 0.5–2.2)
LEUKOCYTE ESTERASE, URINE: ABNORMAL
LEUKOCYTE ESTERASE, URINE: ABNORMAL
LV EF: 65 %
LVEF MODALITY: NORMAL
LYMPHOCYTES ABSOLUTE: 0.3 K/UL (ref 1–4.8)
LYMPHOCYTES ABSOLUTE: 0.5 K/UL (ref 1–4.8)
LYMPHOCYTES ABSOLUTE: 0.6 K/UL (ref 1–4.8)
LYMPHOCYTES ABSOLUTE: 0.7 K/UL (ref 1–4.8)
LYMPHOCYTES ABSOLUTE: 0.8 K/UL (ref 1–4.8)
LYMPHOCYTES ABSOLUTE: 0.9 K/UL (ref 1–4.8)
LYMPHOCYTES ABSOLUTE: 1 K/UL (ref 1–4.8)
LYMPHOCYTES ABSOLUTE: 1 K/UL (ref 1–4.8)
LYMPHOCYTES ABSOLUTE: 1.3 K/UL (ref 1–4.8)
LYMPHOCYTES ABSOLUTE: 1.4 K/UL (ref 1–4.8)
LYMPHOCYTES ABSOLUTE: 1.5 K/UL (ref 1–4.8)
LYMPHOCYTES ABSOLUTE: 1.6 K/UL (ref 1–4.8)
LYMPHOCYTES RELATIVE PERCENT: 12.3 %
LYMPHOCYTES RELATIVE PERCENT: 13 %
LYMPHOCYTES RELATIVE PERCENT: 16.9 %
LYMPHOCYTES RELATIVE PERCENT: 18.7 %
LYMPHOCYTES RELATIVE PERCENT: 18.9 %
LYMPHOCYTES RELATIVE PERCENT: 2 %
LYMPHOCYTES RELATIVE PERCENT: 21.9 %
LYMPHOCYTES RELATIVE PERCENT: 4.7 %
LYMPHOCYTES RELATIVE PERCENT: 6 %
LYMPHOCYTES RELATIVE PERCENT: 6.3 %
LYMPHOCYTES RELATIVE PERCENT: 7.7 %
LYMPHOCYTES RELATIVE PERCENT: 8.7 %
MACROCYTES: ABNORMAL
MACROCYTES: ABNORMAL
MAGNESIUM: 1.7 MG/DL (ref 1.7–2.4)
MAGNESIUM: 1.9 MG/DL (ref 1.7–2.4)
MAGNESIUM: 2 MG/DL (ref 1.7–2.4)
MAGNESIUM: 2.1 MG/DL (ref 1.7–2.4)
MAGNESIUM: 2.1 MG/DL (ref 1.7–2.4)
MCH RBC QN AUTO: 30.8 PG (ref 27–31.3)
MCH RBC QN AUTO: 30.9 PG (ref 27–31.3)
MCH RBC QN AUTO: 30.9 PG (ref 27–31.3)
MCH RBC QN AUTO: 31 PG (ref 27–31.3)
MCH RBC QN AUTO: 31.1 PG (ref 27–31.3)
MCH RBC QN AUTO: 31.2 PG (ref 27–31.3)
MCH RBC QN AUTO: 31.2 PG (ref 27–31.3)
MCH RBC QN AUTO: 31.3 PG (ref 27–31.3)
MCH RBC QN AUTO: 31.3 PG (ref 27–31.3)
MCH RBC QN AUTO: 31.4 PG (ref 27–31.3)
MCH RBC QN AUTO: 31.5 PG (ref 27–31.3)
MCH RBC QN AUTO: 31.5 PG (ref 27–31.3)
MCH RBC QN AUTO: 31.6 PG (ref 27–31.3)
MCHC RBC AUTO-ENTMCNC: 31.6 % (ref 33–37)
MCHC RBC AUTO-ENTMCNC: 31.9 % (ref 33–37)
MCHC RBC AUTO-ENTMCNC: 32 % (ref 33–37)
MCHC RBC AUTO-ENTMCNC: 32.1 % (ref 33–37)
MCHC RBC AUTO-ENTMCNC: 32.3 % (ref 33–37)
MCHC RBC AUTO-ENTMCNC: 32.6 % (ref 33–37)
MCHC RBC AUTO-ENTMCNC: 32.8 % (ref 33–37)
MCHC RBC AUTO-ENTMCNC: 33 % (ref 33–37)
MCHC RBC AUTO-ENTMCNC: 33 % (ref 33–37)
MCHC RBC AUTO-ENTMCNC: 33.1 % (ref 33–37)
MCHC RBC AUTO-ENTMCNC: 33.1 % (ref 33–37)
MCV RBC AUTO: 94.3 FL (ref 82–100)
MCV RBC AUTO: 95.2 FL (ref 82–100)
MCV RBC AUTO: 95.3 FL (ref 82–100)
MCV RBC AUTO: 95.3 FL (ref 82–100)
MCV RBC AUTO: 95.4 FL (ref 82–100)
MCV RBC AUTO: 95.6 FL (ref 82–100)
MCV RBC AUTO: 96.3 FL (ref 82–100)
MCV RBC AUTO: 96.3 FL (ref 82–100)
MCV RBC AUTO: 96.8 FL (ref 82–100)
MCV RBC AUTO: 96.9 FL (ref 82–100)
MCV RBC AUTO: 97 FL (ref 82–100)
MCV RBC AUTO: 97.5 FL (ref 82–100)
MCV RBC AUTO: 97.6 FL (ref 82–100)
MONOCYTES ABSOLUTE: 0.1 K/UL (ref 0.2–0.8)
MONOCYTES ABSOLUTE: 0.3 K/UL (ref 0.2–0.8)
MONOCYTES ABSOLUTE: 0.4 K/UL (ref 0.2–0.8)
MONOCYTES ABSOLUTE: 0.5 K/UL (ref 0.2–0.8)
MONOCYTES ABSOLUTE: 0.7 K/UL (ref 0.2–0.8)
MONOCYTES ABSOLUTE: 0.7 K/UL (ref 0.2–0.8)
MONOCYTES ABSOLUTE: 0.8 K/UL (ref 0.2–0.8)
MONOCYTES ABSOLUTE: 1 K/UL (ref 0.2–0.8)
MONOCYTES RELATIVE PERCENT: 1.4 %
MONOCYTES RELATIVE PERCENT: 1.9 %
MONOCYTES RELATIVE PERCENT: 12.6 %
MONOCYTES RELATIVE PERCENT: 3.7 %
MONOCYTES RELATIVE PERCENT: 3.7 %
MONOCYTES RELATIVE PERCENT: 4.2 %
MONOCYTES RELATIVE PERCENT: 5.3 %
MONOCYTES RELATIVE PERCENT: 6.3 %
MONOCYTES RELATIVE PERCENT: 8 %
MONOCYTES RELATIVE PERCENT: 8.4 %
MONOCYTES RELATIVE PERCENT: 9.7 %
MONOCYTES RELATIVE PERCENT: 9.9 %
MYELOCYTE PERCENT: 1 %
NEUTROPHILS ABSOLUTE: 11.3 K/UL (ref 1.4–6.5)
NEUTROPHILS ABSOLUTE: 12.2 K/UL (ref 1.4–6.5)
NEUTROPHILS ABSOLUTE: 14.8 K/UL (ref 1.4–6.5)
NEUTROPHILS ABSOLUTE: 4.7 K/UL (ref 1.4–6.5)
NEUTROPHILS ABSOLUTE: 4.9 K/UL (ref 1.4–6.5)
NEUTROPHILS ABSOLUTE: 5.5 K/UL (ref 1.4–6.5)
NEUTROPHILS ABSOLUTE: 6 K/UL (ref 1.4–6.5)
NEUTROPHILS ABSOLUTE: 6.6 K/UL (ref 1.4–6.5)
NEUTROPHILS ABSOLUTE: 6.6 K/UL (ref 1.4–6.5)
NEUTROPHILS ABSOLUTE: 7.2 K/UL (ref 1.4–6.5)
NEUTROPHILS ABSOLUTE: 7.5 K/UL (ref 1.4–6.5)
NEUTROPHILS ABSOLUTE: 8.7 K/UL (ref 1.4–6.5)
NEUTROPHILS RELATIVE PERCENT: 68.4 %
NEUTROPHILS RELATIVE PERCENT: 69.9 %
NEUTROPHILS RELATIVE PERCENT: 71.7 %
NEUTROPHILS RELATIVE PERCENT: 73 %
NEUTROPHILS RELATIVE PERCENT: 79.3 %
NEUTROPHILS RELATIVE PERCENT: 81.2 %
NEUTROPHILS RELATIVE PERCENT: 83.7 %
NEUTROPHILS RELATIVE PERCENT: 86 %
NEUTROPHILS RELATIVE PERCENT: 87 %
NEUTROPHILS RELATIVE PERCENT: 88.2 %
NEUTROPHILS RELATIVE PERCENT: 90.2 %
NEUTROPHILS RELATIVE PERCENT: 91.4 %
NITRITE, URINE: POSITIVE
NITRITE, URINE: POSITIVE
NUCLEATED RED BLOOD CELLS: 1 /100 WBC
O2 SAT, ARTERIAL: 90 % (ref 93–100)
PCO2 ARTERIAL: 59 MM HG (ref 35–45)
PDW BLD-RTO: 14.4 % (ref 11.5–14.5)
PDW BLD-RTO: 14.5 % (ref 11.5–14.5)
PDW BLD-RTO: 14.6 % (ref 11.5–14.5)
PDW BLD-RTO: 14.7 % (ref 11.5–14.5)
PDW BLD-RTO: 14.7 % (ref 11.5–14.5)
PDW BLD-RTO: 14.9 % (ref 11.5–14.5)
PDW BLD-RTO: 15 % (ref 11.5–14.5)
PDW BLD-RTO: 15.1 % (ref 11.5–14.5)
PDW BLD-RTO: 15.2 % (ref 11.5–14.5)
PDW BLD-RTO: 15.2 % (ref 11.5–14.5)
PDW BLD-RTO: 15.5 % (ref 11.5–14.5)
PDW BLD-RTO: 15.8 % (ref 11.5–14.5)
PDW BLD-RTO: 16.3 % (ref 11.5–14.5)
PERFORMED ON: ABNORMAL
PERFORMED ON: NORMAL
PH ARTERIAL: 7.29 (ref 7.35–7.45)
PH UA: 5.5 (ref 5–9)
PH UA: 5.5 (ref 5–9)
PLATELET # BLD: 151 K/UL (ref 130–400)
PLATELET # BLD: 151 K/UL (ref 130–400)
PLATELET # BLD: 155 K/UL (ref 130–400)
PLATELET # BLD: 156 K/UL (ref 130–400)
PLATELET # BLD: 158 K/UL (ref 130–400)
PLATELET # BLD: 162 K/UL (ref 130–400)
PLATELET # BLD: 185 K/UL (ref 130–400)
PLATELET # BLD: 189 K/UL (ref 130–400)
PLATELET # BLD: 189 K/UL (ref 130–400)
PLATELET # BLD: 193 K/UL (ref 130–400)
PLATELET # BLD: 202 K/UL (ref 130–400)
PLATELET SLIDE REVIEW: NORMAL
PO2 ARTERIAL: 66 MM HG (ref 75–108)
POC CHLORIDE: 107 MEQ/L (ref 99–110)
POC CREATININE WHOLE BLOOD: 0.7
POC CREATININE: 0.7 MG/DL (ref 0.6–1.2)
POC CREATININE: 0.9 MG/DL (ref 0.6–1.2)
POC FIO2: 3
POC HEMATOCRIT: 37 % (ref 36–48)
POC POTASSIUM: 3.2 MEQ/L (ref 3.5–5.1)
POC SAMPLE TYPE: ABNORMAL
POC SAMPLE TYPE: NORMAL
POC SODIUM: 143 MEQ/L (ref 136–145)
POIKILOCYTES: ABNORMAL
POTASSIUM REFLEX MAGNESIUM: 4.6 MEQ/L (ref 3.4–4.9)
POTASSIUM SERPL-SCNC: 3.5 MEQ/L (ref 3.4–4.9)
POTASSIUM SERPL-SCNC: 3.5 MEQ/L (ref 3.4–4.9)
POTASSIUM SERPL-SCNC: 3.7 MEQ/L (ref 3.4–4.9)
POTASSIUM SERPL-SCNC: 4 MEQ/L (ref 3.4–4.9)
POTASSIUM SERPL-SCNC: 4.1 MEQ/L (ref 3.4–4.9)
POTASSIUM SERPL-SCNC: 4.1 MEQ/L (ref 3.4–4.9)
POTASSIUM SERPL-SCNC: 4.3 MEQ/L (ref 3.4–4.9)
POTASSIUM SERPL-SCNC: 4.4 MEQ/L (ref 3.4–4.9)
POTASSIUM SERPL-SCNC: 5 MEQ/L (ref 3.4–4.9)
PRO-BNP: 2027 PG/ML
PROCALCITONIN: 0.21 NG/ML (ref 0–0.15)
PROCALCITONIN: 12.64 NG/ML (ref 0–0.15)
PROTEIN UA: NEGATIVE MG/DL
PROTEIN UA: NEGATIVE MG/DL
PROTHROMBIN TIME: 25.7 SEC (ref 12.3–14.9)
PROTHROMBIN TIME: 25.9 SEC (ref 12.3–14.9)
PROTHROMBIN TIME: 27.3 SEC (ref 12.3–14.9)
PROTHROMBIN TIME: 27.8 SEC (ref 12.3–14.9)
PROTHROMBIN TIME: 29.6 SEC (ref 12.3–14.9)
PROTHROMBIN TIME: 30.3 SEC (ref 12.3–14.9)
PROTHROMBIN TIME: 33.7 SEC (ref 12.3–14.9)
PROTHROMBIN TIME: 36.5 SEC (ref 12.3–14.9)
PROTHROMBIN TIME: 37 SEC (ref 12.3–14.9)
PROTHROMBIN TIME: 38.8 SEC (ref 12.3–14.9)
PROTHROMBIN TIME: 41.6 SEC (ref 12.3–14.9)
PROTHROMBIN TIME: 42.8 SEC (ref 12.3–14.9)
PROTHROMBIN TIME: 45.3 SEC (ref 12.3–14.9)
PROTHROMBIN TIME: 49.1 SEC (ref 12.3–14.9)
PROTHROMBIN TIME: 54.8 SEC (ref 12.3–14.9)
PROTHROMBIN TIME: 80.9 SEC (ref 12.3–14.9)
PROTHROMBIN TIME: 91.8 SEC (ref 12.3–14.9)
RBC # BLD: 3.46 M/UL (ref 4.2–5.4)
RBC # BLD: 3.51 M/UL (ref 4.2–5.4)
RBC # BLD: 3.57 M/UL (ref 4.2–5.4)
RBC # BLD: 3.6 M/UL (ref 4.2–5.4)
RBC # BLD: 3.63 M/UL (ref 4.2–5.4)
RBC # BLD: 4.03 M/UL (ref 4.2–5.4)
RBC # BLD: 4.07 M/UL (ref 4.2–5.4)
RBC # BLD: 4.26 M/UL (ref 4.2–5.4)
RBC # BLD: 4.29 M/UL (ref 4.2–5.4)
RBC # BLD: 4.36 M/UL (ref 4.2–5.4)
RBC # BLD: 4.51 M/UL (ref 4.2–5.4)
RBC # BLD: 4.52 M/UL (ref 4.2–5.4)
RBC # BLD: 5.11 M/UL (ref 4.2–5.4)
RBC UA: ABNORMAL /HPF (ref 0–2)
RBC UA: NORMAL /HPF (ref 0–5)
REASON FOR REJECTION: NORMAL
REJECTED TEST: NORMAL
SARS-COV-2, NAAT: NOT DETECTED
SEDIMENTATION RATE, ERYTHROCYTE: 11 MM (ref 0–30)
SEDIMENTATION RATE, ERYTHROCYTE: 17 MM (ref 0–30)
SEDIMENTATION RATE, ERYTHROCYTE: 18 MM (ref 0–30)
SEDIMENTATION RATE, ERYTHROCYTE: 4 MM (ref 0–30)
SODIUM BLD-SCNC: 135 MEQ/L (ref 135–144)
SODIUM BLD-SCNC: 135 MEQ/L (ref 135–144)
SODIUM BLD-SCNC: 136 MEQ/L (ref 135–144)
SODIUM BLD-SCNC: 139 MEQ/L (ref 135–144)
SODIUM BLD-SCNC: 140 MEQ/L (ref 135–144)
SODIUM BLD-SCNC: 140 MEQ/L (ref 135–144)
SODIUM BLD-SCNC: 141 MEQ/L (ref 135–144)
SODIUM BLD-SCNC: 141 MEQ/L (ref 135–144)
SODIUM BLD-SCNC: 144 MEQ/L (ref 135–144)
SODIUM BLD-SCNC: 147 MEQ/L (ref 135–144)
SODIUM BLD-SCNC: 148 MEQ/L (ref 135–144)
SPECIFIC GRAVITY UA: 1.01 (ref 1–1.03)
SPECIFIC GRAVITY UA: 1.02 (ref 1–1.03)
TARGET CELLS: ABNORMAL
TCO2 ARTERIAL: 30 (ref 22–29)
TOTAL PROTEIN: 5.6 G/DL (ref 6.3–8)
TOTAL PROTEIN: 5.8 G/DL (ref 6.3–8)
TOTAL PROTEIN: 6.1 G/DL (ref 6.3–8)
TOTAL PROTEIN: 6.1 G/DL (ref 6.3–8)
TOTAL PROTEIN: 6.2 G/DL (ref 6.3–8)
TOTAL PROTEIN: 6.3 G/DL (ref 6.3–8)
TOTAL PROTEIN: 6.3 G/DL (ref 6.3–8)
TOTAL PROTEIN: 6.4 G/DL (ref 6.3–8)
TOTAL PROTEIN: 6.5 G/DL (ref 6.3–8)
TOTAL PROTEIN: 6.6 G/DL (ref 6.3–8)
TOTAL PROTEIN: 6.7 G/DL (ref 6.3–8)
TOTAL PROTEIN: 6.8 G/DL (ref 6.3–8)
TROPONIN: <0.01 NG/ML (ref 0–0.01)
URINE REFLEX TO CULTURE: ABNORMAL
UROBILINOGEN, URINE: 1 E.U./DL
UROBILINOGEN, URINE: 1 E.U./DL
WBC # BLD: 10 K/UL (ref 4.8–10.8)
WBC # BLD: 12.3 K/UL (ref 4.8–10.8)
WBC # BLD: 13.5 K/UL (ref 4.8–10.8)
WBC # BLD: 15.3 K/UL (ref 4.8–10.8)
WBC # BLD: 6.8 K/UL (ref 4.8–10.8)
WBC # BLD: 7 K/UL (ref 4.8–10.8)
WBC # BLD: 7.4 K/UL (ref 4.8–10.8)
WBC # BLD: 8 K/UL (ref 4.8–10.8)
WBC # BLD: 8.1 K/UL (ref 4.8–10.8)
WBC # BLD: 8.3 K/UL (ref 4.8–10.8)
WBC # BLD: 8.4 K/UL (ref 4.8–10.8)
WBC # BLD: 8.5 K/UL (ref 4.8–10.8)
WBC # BLD: 8.6 K/UL (ref 4.8–10.8)
WBC UA: ABNORMAL /HPF (ref 0–5)
WBC UA: NORMAL /HPF (ref 0–5)

## 2020-01-01 PROCEDURE — 2060000000 HC ICU INTERMEDIATE R&B

## 2020-01-01 PROCEDURE — 99232 SBSQ HOSP IP/OBS MODERATE 35: CPT | Performed by: INTERNAL MEDICINE

## 2020-01-01 PROCEDURE — 2580000003 HC RX 258: Performed by: INTERNAL MEDICINE

## 2020-01-01 PROCEDURE — 6370000000 HC RX 637 (ALT 250 FOR IP): Performed by: INTERNAL MEDICINE

## 2020-01-01 PROCEDURE — 85025 COMPLETE CBC W/AUTO DIFF WBC: CPT

## 2020-01-01 PROCEDURE — 85610 PROTHROMBIN TIME: CPT

## 2020-01-01 PROCEDURE — P9047 ALBUMIN (HUMAN), 25%, 50ML: HCPCS | Performed by: INTERNAL MEDICINE

## 2020-01-01 PROCEDURE — 74183 MRI ABD W/O CNTR FLWD CNTR: CPT

## 2020-01-01 PROCEDURE — 93010 ELECTROCARDIOGRAM REPORT: CPT | Performed by: INTERNAL MEDICINE

## 2020-01-01 PROCEDURE — 1210000000 HC MED SURG R&B

## 2020-01-01 PROCEDURE — 97162 PT EVAL MOD COMPLEX 30 MIN: CPT

## 2020-01-01 PROCEDURE — 94761 N-INVAS EAR/PLS OXIMETRY MLT: CPT

## 2020-01-01 PROCEDURE — 6360000002 HC RX W HCPCS: Performed by: INTERNAL MEDICINE

## 2020-01-01 PROCEDURE — 93005 ELECTROCARDIOGRAM TRACING: CPT | Performed by: FAMILY MEDICINE

## 2020-01-01 PROCEDURE — 93005 ELECTROCARDIOGRAM TRACING: CPT | Performed by: PHYSICIAN ASSISTANT

## 2020-01-01 PROCEDURE — 92526 ORAL FUNCTION THERAPY: CPT

## 2020-01-01 PROCEDURE — 6360000002 HC RX W HCPCS: Performed by: FAMILY MEDICINE

## 2020-01-01 PROCEDURE — U0002 COVID-19 LAB TEST NON-CDC: HCPCS

## 2020-01-01 PROCEDURE — 36415 COLL VENOUS BLD VENIPUNCTURE: CPT

## 2020-01-01 PROCEDURE — 74177 CT ABD & PELVIS W/CONTRAST: CPT

## 2020-01-01 PROCEDURE — 83735 ASSAY OF MAGNESIUM: CPT

## 2020-01-01 PROCEDURE — 99231 SBSQ HOSP IP/OBS SF/LOW 25: CPT | Performed by: PHYSICAL MEDICINE & REHABILITATION

## 2020-01-01 PROCEDURE — 93005 ELECTROCARDIOGRAM TRACING: CPT | Performed by: INTERNAL MEDICINE

## 2020-01-01 PROCEDURE — 99222 1ST HOSP IP/OBS MODERATE 55: CPT | Performed by: SPECIALIST

## 2020-01-01 PROCEDURE — 84295 ASSAY OF SERUM SODIUM: CPT

## 2020-01-01 PROCEDURE — 94640 AIRWAY INHALATION TREATMENT: CPT

## 2020-01-01 PROCEDURE — 99222 1ST HOSP IP/OBS MODERATE 55: CPT | Performed by: PHYSICAL MEDICINE & REHABILITATION

## 2020-01-01 PROCEDURE — 6370000000 HC RX 637 (ALT 250 FOR IP): Performed by: PHYSICAL MEDICINE & REHABILITATION

## 2020-01-01 PROCEDURE — 93306 TTE W/DOPPLER COMPLETE: CPT

## 2020-01-01 PROCEDURE — 2500000003 HC RX 250 WO HCPCS: Performed by: FAMILY MEDICINE

## 2020-01-01 PROCEDURE — 99221 1ST HOSP IP/OBS SF/LOW 40: CPT | Performed by: NURSE PRACTITIONER

## 2020-01-01 PROCEDURE — 94660 CPAP INITIATION&MGMT: CPT

## 2020-01-01 PROCEDURE — 2580000003 HC RX 258: Performed by: PHYSICIAN ASSISTANT

## 2020-01-01 PROCEDURE — 99231 SBSQ HOSP IP/OBS SF/LOW 25: CPT | Performed by: SPECIALIST

## 2020-01-01 PROCEDURE — 2580000003 HC RX 258: Performed by: PHYSICAL MEDICINE & REHABILITATION

## 2020-01-01 PROCEDURE — 2700000000 HC OXYGEN THERAPY PER DAY

## 2020-01-01 PROCEDURE — 93975 VASCULAR STUDY: CPT

## 2020-01-01 PROCEDURE — 97535 SELF CARE MNGMENT TRAINING: CPT

## 2020-01-01 PROCEDURE — 80053 COMPREHEN METABOLIC PANEL: CPT

## 2020-01-01 PROCEDURE — 81001 URINALYSIS AUTO W/SCOPE: CPT

## 2020-01-01 PROCEDURE — 6370000000 HC RX 637 (ALT 250 FOR IP): Performed by: FAMILY MEDICINE

## 2020-01-01 PROCEDURE — 80048 BASIC METABOLIC PNL TOTAL CA: CPT

## 2020-01-01 PROCEDURE — 74176 CT ABD & PELVIS W/O CONTRAST: CPT

## 2020-01-01 PROCEDURE — 2580000003 HC RX 258

## 2020-01-01 PROCEDURE — 99232 SBSQ HOSP IP/OBS MODERATE 35: CPT | Performed by: NURSE PRACTITIONER

## 2020-01-01 PROCEDURE — 97166 OT EVAL MOD COMPLEX 45 MIN: CPT

## 2020-01-01 PROCEDURE — 87040 BLOOD CULTURE FOR BACTERIA: CPT

## 2020-01-01 PROCEDURE — 85652 RBC SED RATE AUTOMATED: CPT

## 2020-01-01 PROCEDURE — 80076 HEPATIC FUNCTION PANEL: CPT

## 2020-01-01 PROCEDURE — 82435 ASSAY OF BLOOD CHLORIDE: CPT

## 2020-01-01 PROCEDURE — 83605 ASSAY OF LACTIC ACID: CPT

## 2020-01-01 PROCEDURE — 85730 THROMBOPLASTIN TIME PARTIAL: CPT

## 2020-01-01 PROCEDURE — 92610 EVALUATE SWALLOWING FUNCTION: CPT

## 2020-01-01 PROCEDURE — 86141 C-REACTIVE PROTEIN HS: CPT

## 2020-01-01 PROCEDURE — 84484 ASSAY OF TROPONIN QUANT: CPT

## 2020-01-01 PROCEDURE — 2500000003 HC RX 250 WO HCPCS: Performed by: INTERNAL MEDICINE

## 2020-01-01 PROCEDURE — 82565 ASSAY OF CREATININE: CPT

## 2020-01-01 PROCEDURE — 86901 BLOOD TYPING SEROLOGIC RH(D): CPT

## 2020-01-01 PROCEDURE — 85027 COMPLETE CBC AUTOMATED: CPT

## 2020-01-01 PROCEDURE — 76705 ECHO EXAM OF ABDOMEN: CPT

## 2020-01-01 PROCEDURE — 82330 ASSAY OF CALCIUM: CPT

## 2020-01-01 PROCEDURE — 84145 PROCALCITONIN (PCT): CPT

## 2020-01-01 PROCEDURE — 5A2204Z RESTORATION OF CARDIAC RHYTHM, SINGLE: ICD-10-PCS | Performed by: INTERNAL MEDICINE

## 2020-01-01 PROCEDURE — 6360000002 HC RX W HCPCS: Performed by: PHYSICIAN ASSISTANT

## 2020-01-01 PROCEDURE — 99232 SBSQ HOSP IP/OBS MODERATE 35: CPT | Performed by: PHYSICAL MEDICINE & REHABILITATION

## 2020-01-01 PROCEDURE — 86850 RBC ANTIBODY SCREEN: CPT

## 2020-01-01 PROCEDURE — 99221 1ST HOSP IP/OBS SF/LOW 40: CPT | Performed by: INTERNAL MEDICINE

## 2020-01-01 PROCEDURE — 99285 EMERGENCY DEPT VISIT HI MDM: CPT

## 2020-01-01 PROCEDURE — 96361 HYDRATE IV INFUSION ADD-ON: CPT

## 2020-01-01 PROCEDURE — 6360000002 HC RX W HCPCS

## 2020-01-01 PROCEDURE — 96375 TX/PRO/DX INJ NEW DRUG ADDON: CPT

## 2020-01-01 PROCEDURE — 97110 THERAPEUTIC EXERCISES: CPT

## 2020-01-01 PROCEDURE — 6360000002 HC RX W HCPCS: Performed by: PHYSICAL MEDICINE & REHABILITATION

## 2020-01-01 PROCEDURE — 94760 N-INVAS EAR/PLS OXIMETRY 1: CPT

## 2020-01-01 PROCEDURE — 71046 X-RAY EXAM CHEST 2 VIEWS: CPT

## 2020-01-01 PROCEDURE — 99223 1ST HOSP IP/OBS HIGH 75: CPT | Performed by: INTERNAL MEDICINE

## 2020-01-01 PROCEDURE — 84132 ASSAY OF SERUM POTASSIUM: CPT

## 2020-01-01 PROCEDURE — 82803 BLOOD GASES ANY COMBINATION: CPT

## 2020-01-01 PROCEDURE — 99221 1ST HOSP IP/OBS SF/LOW 40: CPT | Performed by: COLON & RECTAL SURGERY

## 2020-01-01 PROCEDURE — 71045 X-RAY EXAM CHEST 1 VIEW: CPT

## 2020-01-01 PROCEDURE — 6360000004 HC RX CONTRAST MEDICATION: Performed by: INTERNAL MEDICINE

## 2020-01-01 PROCEDURE — 2580000003 HC RX 258: Performed by: FAMILY MEDICINE

## 2020-01-01 PROCEDURE — 96367 TX/PROPH/DG ADDL SEQ IV INF: CPT

## 2020-01-01 PROCEDURE — 94667 MNPJ CHEST WALL 1ST: CPT

## 2020-01-01 PROCEDURE — 99283 EMERGENCY DEPT VISIT LOW MDM: CPT

## 2020-01-01 PROCEDURE — 2500000003 HC RX 250 WO HCPCS

## 2020-01-01 PROCEDURE — 85014 HEMATOCRIT: CPT

## 2020-01-01 PROCEDURE — 96365 THER/PROPH/DIAG IV INF INIT: CPT

## 2020-01-01 PROCEDURE — 2000000000 HC ICU R&B

## 2020-01-01 PROCEDURE — 94664 DEMO&/EVAL PT USE INHALER: CPT

## 2020-01-01 PROCEDURE — 99213 OFFICE O/P EST LOW 20 MIN: CPT

## 2020-01-01 PROCEDURE — 87502 INFLUENZA DNA AMP PROBE: CPT

## 2020-01-01 PROCEDURE — A9577 INJ MULTIHANCE: HCPCS | Performed by: INTERNAL MEDICINE

## 2020-01-01 PROCEDURE — 99222 1ST HOSP IP/OBS MODERATE 55: CPT | Performed by: INTERNAL MEDICINE

## 2020-01-01 PROCEDURE — 86900 BLOOD TYPING SEROLOGIC ABO: CPT

## 2020-01-01 PROCEDURE — 6360000004 HC RX CONTRAST MEDICATION: Performed by: PHYSICIAN ASSISTANT

## 2020-01-01 PROCEDURE — 36600 WITHDRAWAL OF ARTERIAL BLOOD: CPT

## 2020-01-01 PROCEDURE — 83880 ASSAY OF NATRIURETIC PEPTIDE: CPT

## 2020-01-01 RX ORDER — SODIUM CHLORIDE 9 MG/ML
INJECTION, SOLUTION INTRAVENOUS
Status: COMPLETED
Start: 2020-01-01 | End: 2020-01-01

## 2020-01-01 RX ORDER — AMIODARONE HYDROCHLORIDE 200 MG/1
400 TABLET ORAL 2 TIMES DAILY
Status: DISCONTINUED | OUTPATIENT
Start: 2020-01-01 | End: 2020-01-01

## 2020-01-01 RX ORDER — ACETAMINOPHEN 650 MG/1
650 SUPPOSITORY RECTAL EVERY 6 HOURS PRN
Status: DISCONTINUED | OUTPATIENT
Start: 2020-01-01 | End: 2020-01-01 | Stop reason: HOSPADM

## 2020-01-01 RX ORDER — 0.9 % SODIUM CHLORIDE 0.9 %
500 INTRAVENOUS SOLUTION INTRAVENOUS ONCE
Status: COMPLETED | OUTPATIENT
Start: 2020-01-01 | End: 2020-01-01

## 2020-01-01 RX ORDER — AMOXICILLIN AND CLAVULANATE POTASSIUM 875; 125 MG/1; MG/1
1 TABLET, FILM COATED ORAL EVERY 12 HOURS SCHEDULED
Qty: 6 TABLET | Refills: 0 | Status: SHIPPED | OUTPATIENT
Start: 2020-01-01 | End: 2020-01-01

## 2020-01-01 RX ORDER — FUROSEMIDE 10 MG/ML
20 INJECTION INTRAMUSCULAR; INTRAVENOUS ONCE
Status: COMPLETED | OUTPATIENT
Start: 2020-01-01 | End: 2020-01-01

## 2020-01-01 RX ORDER — LANOLIN ALCOHOL/MO/W.PET/CERES
3 CREAM (GRAM) TOPICAL NIGHTLY PRN
Status: DISCONTINUED | OUTPATIENT
Start: 2020-01-01 | End: 2020-01-01 | Stop reason: HOSPADM

## 2020-01-01 RX ORDER — WARFARIN SODIUM 2.5 MG/1
2.5 TABLET ORAL
Status: DISCONTINUED | OUTPATIENT
Start: 2020-01-01 | End: 2020-01-01 | Stop reason: HOSPADM

## 2020-01-01 RX ORDER — METHYLPREDNISOLONE SODIUM SUCCINATE 125 MG/2ML
125 INJECTION, POWDER, LYOPHILIZED, FOR SOLUTION INTRAMUSCULAR; INTRAVENOUS ONCE
Status: COMPLETED | OUTPATIENT
Start: 2020-01-01 | End: 2020-01-01

## 2020-01-01 RX ORDER — CYANOCOBALAMIN 1000 UG/ML
1000 INJECTION INTRAMUSCULAR; SUBCUTANEOUS WEEKLY
Status: DISCONTINUED | OUTPATIENT
Start: 2020-01-01 | End: 2020-01-01 | Stop reason: HOSPADM

## 2020-01-01 RX ORDER — WARFARIN SODIUM 5 MG/1
5 TABLET ORAL
Status: DISCONTINUED | OUTPATIENT
Start: 2020-01-01 | End: 2020-01-01

## 2020-01-01 RX ORDER — ALBUMIN (HUMAN) 12.5 G/50ML
50 SOLUTION INTRAVENOUS ONCE
Status: DISCONTINUED | OUTPATIENT
Start: 2020-01-01 | End: 2020-01-01

## 2020-01-01 RX ORDER — WARFARIN SODIUM 2 MG/1
2 TABLET ORAL
Status: COMPLETED | OUTPATIENT
Start: 2020-01-01 | End: 2020-01-01

## 2020-01-01 RX ORDER — ALBUTEROL SULFATE 2.5 MG/3ML
2.5 SOLUTION RESPIRATORY (INHALATION)
Status: DISCONTINUED | OUTPATIENT
Start: 2020-01-01 | End: 2020-01-01

## 2020-01-01 RX ORDER — LACTULOSE 10 G/15ML
30 SOLUTION ORAL 2 TIMES DAILY
Status: DISCONTINUED | OUTPATIENT
Start: 2020-01-01 | End: 2020-01-01 | Stop reason: HOSPADM

## 2020-01-01 RX ORDER — DIGOXIN 125 MCG
125 TABLET ORAL 2 TIMES DAILY
Status: DISCONTINUED | OUTPATIENT
Start: 2020-01-01 | End: 2020-01-01

## 2020-01-01 RX ORDER — PROMETHAZINE HYDROCHLORIDE 12.5 MG/1
12.5 TABLET ORAL EVERY 6 HOURS PRN
Status: DISCONTINUED | OUTPATIENT
Start: 2020-01-01 | End: 2020-01-01 | Stop reason: HOSPADM

## 2020-01-01 RX ORDER — POLYETHYLENE GLYCOL 3350 17 G/17G
17 POWDER, FOR SOLUTION ORAL 2 TIMES DAILY
Status: DISCONTINUED | OUTPATIENT
Start: 2020-01-01 | End: 2020-01-01 | Stop reason: HOSPADM

## 2020-01-01 RX ORDER — SENNOSIDES 8.8 MG/5ML
5 LIQUID ORAL 2 TIMES DAILY
Status: DISCONTINUED | OUTPATIENT
Start: 2020-01-01 | End: 2020-01-01 | Stop reason: HOSPADM

## 2020-01-01 RX ORDER — SODIUM CHLORIDE 9 MG/ML
INJECTION, SOLUTION INTRAVENOUS CONTINUOUS
Status: DISCONTINUED | OUTPATIENT
Start: 2020-01-01 | End: 2020-01-01

## 2020-01-01 RX ORDER — SULFAMETHOXAZOLE AND TRIMETHOPRIM 800; 160 MG/1; MG/1
1 TABLET ORAL EVERY 12 HOURS SCHEDULED
Status: DISCONTINUED | OUTPATIENT
Start: 2020-01-01 | End: 2020-01-01

## 2020-01-01 RX ORDER — METOPROLOL TARTRATE 50 MG/1
75 TABLET, FILM COATED ORAL DAILY
Status: ON HOLD | COMMUNITY
End: 2020-01-01 | Stop reason: HOSPADM

## 2020-01-01 RX ORDER — UBIDECARENONE 100 MG
100 CAPSULE ORAL DAILY
Status: DISCONTINUED | OUTPATIENT
Start: 2020-01-01 | End: 2020-01-01 | Stop reason: HOSPADM

## 2020-01-01 RX ORDER — ALBUTEROL SULFATE 2.5 MG/3ML
2.5 SOLUTION RESPIRATORY (INHALATION) EVERY 6 HOURS PRN
Status: DISCONTINUED | OUTPATIENT
Start: 2020-01-01 | End: 2020-01-01 | Stop reason: HOSPADM

## 2020-01-01 RX ORDER — LIDOCAINE 4 G/G
3 PATCH TOPICAL DAILY
Status: DISCONTINUED | OUTPATIENT
Start: 2020-01-01 | End: 2020-01-01 | Stop reason: HOSPADM

## 2020-01-01 RX ORDER — POTASSIUM CHLORIDE 7.45 MG/ML
10 INJECTION INTRAVENOUS
Status: COMPLETED | OUTPATIENT
Start: 2020-01-01 | End: 2020-01-01

## 2020-01-01 RX ORDER — FLUMAZENIL 0.1 MG/ML
INJECTION, SOLUTION INTRAVENOUS
Status: COMPLETED | OUTPATIENT
Start: 2020-01-01 | End: 2020-01-01

## 2020-01-01 RX ORDER — DIGOXIN 0.25 MG/ML
250 INJECTION INTRAMUSCULAR; INTRAVENOUS ONCE
Status: COMPLETED | OUTPATIENT
Start: 2020-01-01 | End: 2020-01-01

## 2020-01-01 RX ORDER — SODIUM CHLORIDE 0.9 % (FLUSH) 0.9 %
10 SYRINGE (ML) INJECTION EVERY 12 HOURS SCHEDULED
Status: DISCONTINUED | OUTPATIENT
Start: 2020-01-01 | End: 2020-01-01 | Stop reason: HOSPADM

## 2020-01-01 RX ORDER — SODIUM CHLORIDE 0.9 % (FLUSH) 0.9 %
10 SYRINGE (ML) INJECTION EVERY 12 HOURS SCHEDULED
Status: DISCONTINUED | OUTPATIENT
Start: 2020-01-01 | End: 2020-01-01

## 2020-01-01 RX ORDER — 0.9 % SODIUM CHLORIDE 0.9 %
1000 INTRAVENOUS SOLUTION INTRAVENOUS ONCE
Status: DISCONTINUED | OUTPATIENT
Start: 2020-01-01 | End: 2020-01-01 | Stop reason: HOSPADM

## 2020-01-01 RX ORDER — SODIUM CHLORIDE AND POTASSIUM CHLORIDE .9; .15 G/100ML; G/100ML
SOLUTION INTRAVENOUS CONTINUOUS
Status: DISCONTINUED | OUTPATIENT
Start: 2020-01-01 | End: 2020-01-01

## 2020-01-01 RX ORDER — 0.9 % SODIUM CHLORIDE 0.9 %
20 INTRAVENOUS SOLUTION INTRAVENOUS ONCE
Status: DISCONTINUED | OUTPATIENT
Start: 2020-01-01 | End: 2020-01-01

## 2020-01-01 RX ORDER — SODIUM PHOSPHATE, DIBASIC AND SODIUM PHOSPHATE, MONOBASIC 7; 19 G/133ML; G/133ML
1 ENEMA RECTAL
Status: COMPLETED | OUTPATIENT
Start: 2020-01-01 | End: 2020-01-01

## 2020-01-01 RX ORDER — AMIODARONE HYDROCHLORIDE 200 MG/1
200 TABLET ORAL 2 TIMES DAILY
Status: DISCONTINUED | OUTPATIENT
Start: 2020-01-01 | End: 2020-01-01 | Stop reason: HOSPADM

## 2020-01-01 RX ORDER — SODIUM CHLORIDE 0.9 % (FLUSH) 0.9 %
10 SYRINGE (ML) INJECTION PRN
Status: DISCONTINUED | OUTPATIENT
Start: 2020-01-01 | End: 2020-01-01

## 2020-01-01 RX ORDER — ONDANSETRON 2 MG/ML
4 INJECTION INTRAMUSCULAR; INTRAVENOUS EVERY 6 HOURS PRN
Status: DISCONTINUED | OUTPATIENT
Start: 2020-01-01 | End: 2020-01-01 | Stop reason: HOSPADM

## 2020-01-01 RX ORDER — AMOXICILLIN AND CLAVULANATE POTASSIUM 875; 125 MG/1; MG/1
1 TABLET, FILM COATED ORAL EVERY 12 HOURS SCHEDULED
Status: DISCONTINUED | OUTPATIENT
Start: 2020-01-01 | End: 2020-01-01 | Stop reason: HOSPADM

## 2020-01-01 RX ORDER — POTASSIUM CHLORIDE 20 MEQ/1
40 TABLET, EXTENDED RELEASE ORAL ONCE
Status: COMPLETED | OUTPATIENT
Start: 2020-01-01 | End: 2020-01-01

## 2020-01-01 RX ORDER — ONDANSETRON 2 MG/ML
4 INJECTION INTRAMUSCULAR; INTRAVENOUS ONCE
Status: COMPLETED | OUTPATIENT
Start: 2020-01-01 | End: 2020-01-01

## 2020-01-01 RX ORDER — METOPROLOL TARTRATE 5 MG/5ML
5 INJECTION INTRAVENOUS ONCE
Status: COMPLETED | OUTPATIENT
Start: 2020-01-01 | End: 2020-01-01

## 2020-01-01 RX ORDER — MIDAZOLAM HYDROCHLORIDE 1 MG/ML
INJECTION INTRAMUSCULAR; INTRAVENOUS
Status: COMPLETED | OUTPATIENT
Start: 2020-01-01 | End: 2020-01-01

## 2020-01-01 RX ORDER — IPRATROPIUM BROMIDE AND ALBUTEROL SULFATE 2.5; .5 MG/3ML; MG/3ML
1 SOLUTION RESPIRATORY (INHALATION) ONCE
Status: COMPLETED | OUTPATIENT
Start: 2020-01-01 | End: 2020-01-01

## 2020-01-01 RX ORDER — SODIUM CHLORIDE 0.9 % (FLUSH) 0.9 %
10 SYRINGE (ML) INJECTION 2 TIMES DAILY
Status: DISCONTINUED | OUTPATIENT
Start: 2020-01-01 | End: 2020-01-01

## 2020-01-01 RX ORDER — POLYETHYLENE GLYCOL 3350 17 G/17G
17 POWDER, FOR SOLUTION ORAL DAILY PRN
Status: DISCONTINUED | OUTPATIENT
Start: 2020-01-01 | End: 2020-01-01 | Stop reason: HOSPADM

## 2020-01-01 RX ORDER — ALBUTEROL SULFATE 2.5 MG/3ML
2.5 SOLUTION RESPIRATORY (INHALATION) EVERY 4 HOURS PRN
Status: DISCONTINUED | OUTPATIENT
Start: 2020-01-01 | End: 2020-01-01 | Stop reason: HOSPADM

## 2020-01-01 RX ORDER — SODIUM CHLORIDE 0.9 % (FLUSH) 0.9 %
10 SYRINGE (ML) INJECTION PRN
Status: DISCONTINUED | OUTPATIENT
Start: 2020-01-01 | End: 2020-01-01 | Stop reason: HOSPADM

## 2020-01-01 RX ORDER — 0.9 % SODIUM CHLORIDE 0.9 %
1000 INTRAVENOUS SOLUTION INTRAVENOUS ONCE
Status: COMPLETED | OUTPATIENT
Start: 2020-01-01 | End: 2020-01-01

## 2020-01-01 RX ORDER — SODIUM CHLORIDE 9 MG/ML
INJECTION, SOLUTION INTRAVENOUS CONTINUOUS
Status: DISPENSED | OUTPATIENT
Start: 2020-01-01 | End: 2020-01-01

## 2020-01-01 RX ORDER — POTASSIUM CHLORIDE 1.5 G/1.77G
40 POWDER, FOR SOLUTION ORAL ONCE
Status: DISCONTINUED | OUTPATIENT
Start: 2020-01-01 | End: 2020-01-01

## 2020-01-01 RX ORDER — 0.9 % SODIUM CHLORIDE 0.9 %
500 INTRAVENOUS SOLUTION INTRAVENOUS ONCE
Status: DISCONTINUED | OUTPATIENT
Start: 2020-01-01 | End: 2020-01-01

## 2020-01-01 RX ORDER — SODIUM CHLORIDE 9 MG/ML
INJECTION, SOLUTION INTRAVENOUS
Status: DISCONTINUED
Start: 2020-01-01 | End: 2020-01-01 | Stop reason: WASHOUT

## 2020-01-01 RX ORDER — MAGNESIUM SULFATE IN WATER 40 MG/ML
2 INJECTION, SOLUTION INTRAVENOUS ONCE
Status: DISCONTINUED | OUTPATIENT
Start: 2020-01-01 | End: 2020-01-01

## 2020-01-01 RX ORDER — METOPROLOL TARTRATE 5 MG/5ML
5 INJECTION INTRAVENOUS ONCE
Status: DISCONTINUED | OUTPATIENT
Start: 2020-01-01 | End: 2020-01-01

## 2020-01-01 RX ORDER — ACETAMINOPHEN 325 MG/1
650 TABLET ORAL EVERY 4 HOURS PRN
Status: DISCONTINUED | OUTPATIENT
Start: 2020-01-01 | End: 2020-01-01 | Stop reason: HOSPADM

## 2020-01-01 RX ORDER — 0.9 % SODIUM CHLORIDE 0.9 %
1000 INTRAVENOUS SOLUTION INTRAVENOUS ONCE
Status: DISCONTINUED | OUTPATIENT
Start: 2020-01-01 | End: 2020-01-01

## 2020-01-01 RX ORDER — PHYTONADIONE 1 MG/.5ML
1 INJECTION, EMULSION INTRAMUSCULAR; INTRAVENOUS; SUBCUTANEOUS ONCE
Status: COMPLETED | OUTPATIENT
Start: 2020-01-01 | End: 2020-01-01

## 2020-01-01 RX ORDER — VITAMIN B COMPLEX
2000 TABLET ORAL
Status: DISCONTINUED | OUTPATIENT
Start: 2020-01-01 | End: 2020-01-01 | Stop reason: HOSPADM

## 2020-01-01 RX ORDER — ONDANSETRON 2 MG/ML
4 INJECTION INTRAMUSCULAR; INTRAVENOUS ONCE
Status: DISCONTINUED | OUTPATIENT
Start: 2020-01-01 | End: 2020-01-01

## 2020-01-01 RX ORDER — DEXTROSE AND SODIUM CHLORIDE 5; .9 G/100ML; G/100ML
INJECTION, SOLUTION INTRAVENOUS CONTINUOUS
Status: DISCONTINUED | OUTPATIENT
Start: 2020-01-01 | End: 2020-01-01 | Stop reason: HOSPADM

## 2020-01-01 RX ORDER — DOCUSATE SODIUM 100 MG/1
100 CAPSULE, LIQUID FILLED ORAL DAILY
Status: DISCONTINUED | OUTPATIENT
Start: 2020-01-01 | End: 2020-01-01 | Stop reason: HOSPADM

## 2020-01-01 RX ORDER — TRAMADOL HYDROCHLORIDE 50 MG/1
50 TABLET ORAL EVERY 8 HOURS PRN
Status: ON HOLD | COMMUNITY
End: 2020-01-01 | Stop reason: HOSPADM

## 2020-01-01 RX ORDER — METOPROLOL TARTRATE 5 MG/5ML
5 INJECTION INTRAVENOUS EVERY 6 HOURS PRN
Status: DISCONTINUED | OUTPATIENT
Start: 2020-01-01 | End: 2020-01-01 | Stop reason: HOSPADM

## 2020-01-01 RX ORDER — ACETAMINOPHEN 325 MG/1
650 TABLET ORAL EVERY 6 HOURS PRN
Status: DISCONTINUED | OUTPATIENT
Start: 2020-01-01 | End: 2020-01-01 | Stop reason: HOSPADM

## 2020-01-01 RX ORDER — ALBUMIN (HUMAN) 12.5 G/50ML
50 SOLUTION INTRAVENOUS 2 TIMES DAILY
Status: DISCONTINUED | OUTPATIENT
Start: 2020-01-01 | End: 2020-01-01

## 2020-01-01 RX ORDER — BISACODYL 10 MG
10 SUPPOSITORY, RECTAL RECTAL DAILY PRN
Status: DISCONTINUED | OUTPATIENT
Start: 2020-01-01 | End: 2020-01-01 | Stop reason: HOSPADM

## 2020-01-01 RX ADMIN — POLYETHYLENE GLYCOL 3350 17 G: 17 POWDER, FOR SOLUTION ORAL at 21:02

## 2020-01-01 RX ADMIN — METOPROLOL TARTRATE 12.5 MG: 25 TABLET, FILM COATED ORAL at 09:49

## 2020-01-01 RX ADMIN — ALBUMIN (HUMAN) 50 G: 0.25 INJECTION, SOLUTION INTRAVENOUS at 13:29

## 2020-01-01 RX ADMIN — DIGOXIN 250 MCG: 250 INJECTION, SOLUTION INTRAMUSCULAR; INTRAVENOUS; PARENTERAL at 13:28

## 2020-01-01 RX ADMIN — PIPERACILLIN AND TAZOBACTAM 3.38 G: 3; .375 INJECTION, POWDER, LYOPHILIZED, FOR SOLUTION INTRAVENOUS at 14:11

## 2020-01-01 RX ADMIN — SODIUM CHLORIDE 500 ML: 9 INJECTION, SOLUTION INTRAVENOUS at 09:08

## 2020-01-01 RX ADMIN — POLYETHYLENE GLYCOL 3350 17 G: 17 POWDER, FOR SOLUTION ORAL at 21:24

## 2020-01-01 RX ADMIN — PIPERACILLIN AND TAZOBACTAM 3.38 G: 3; .375 INJECTION, POWDER, LYOPHILIZED, FOR SOLUTION INTRAVENOUS at 03:44

## 2020-01-01 RX ADMIN — METOPROLOL TARTRATE 25 MG: 25 TABLET ORAL at 21:19

## 2020-01-01 RX ADMIN — SULFAMETHOXAZOLE AND TRIMETHOPRIM 1 TABLET: 800; 160 TABLET ORAL at 20:18

## 2020-01-01 RX ADMIN — ALBUTEROL SULFATE 2.5 MG: 2.5 SOLUTION RESPIRATORY (INHALATION) at 07:03

## 2020-01-01 RX ADMIN — PIPERACILLIN AND TAZOBACTAM 3.38 G: 3; .375 INJECTION, POWDER, LYOPHILIZED, FOR SOLUTION INTRAVENOUS at 13:41

## 2020-01-01 RX ADMIN — PIPERACILLIN AND TAZOBACTAM 3.38 G: 3; .375 INJECTION, POWDER, LYOPHILIZED, FOR SOLUTION INTRAVENOUS at 23:45

## 2020-01-01 RX ADMIN — Medication 10 ML: at 20:33

## 2020-01-01 RX ADMIN — METOPROLOL TARTRATE 12.5 MG: 25 TABLET, FILM COATED ORAL at 20:11

## 2020-01-01 RX ADMIN — Medication 100 MG: at 08:43

## 2020-01-01 RX ADMIN — Medication 10 ML: at 09:00

## 2020-01-01 RX ADMIN — Medication 10 ML: at 20:18

## 2020-01-01 RX ADMIN — FAMOTIDINE 20 MG: 10 INJECTION, SOLUTION INTRAVENOUS at 10:57

## 2020-01-01 RX ADMIN — METOPROLOL TARTRATE 12.5 MG: 25 TABLET, FILM COATED ORAL at 08:43

## 2020-01-01 RX ADMIN — ONDANSETRON 4 MG: 2 INJECTION INTRAMUSCULAR; INTRAVENOUS at 08:11

## 2020-01-01 RX ADMIN — ALBUTEROL SULFATE 2.5 MG: 2.5 SOLUTION RESPIRATORY (INHALATION) at 19:19

## 2020-01-01 RX ADMIN — PIPERACILLIN AND TAZOBACTAM 3.38 G: 3; .375 INJECTION, POWDER, LYOPHILIZED, FOR SOLUTION INTRAVENOUS at 12:09

## 2020-01-01 RX ADMIN — SODIUM PHOSPHATE, DIBASIC AND SODIUM PHOSPHATE, MONOBASIC 1 ENEMA: 7; 19 ENEMA RECTAL at 15:42

## 2020-01-01 RX ADMIN — ALBUMIN (HUMAN) 50 G: 0.25 INJECTION, SOLUTION INTRAVENOUS at 21:27

## 2020-01-01 RX ADMIN — PIPERACILLIN AND TAZOBACTAM 3.38 G: 3; .375 INJECTION, POWDER, LYOPHILIZED, FOR SOLUTION INTRAVENOUS at 05:08

## 2020-01-01 RX ADMIN — METOPROLOL TARTRATE 12.5 MG: 25 TABLET, FILM COATED ORAL at 21:28

## 2020-01-01 RX ADMIN — ONDANSETRON 4 MG: 2 INJECTION INTRAMUSCULAR; INTRAVENOUS at 23:45

## 2020-01-01 RX ADMIN — POTASSIUM CHLORIDE 40 MEQ: 20 TABLET, EXTENDED RELEASE ORAL at 14:09

## 2020-01-01 RX ADMIN — Medication 8.8 MG: at 21:07

## 2020-01-01 RX ADMIN — SODIUM CHLORIDE 1000 ML: 9 INJECTION, SOLUTION INTRAVENOUS at 12:31

## 2020-01-01 RX ADMIN — AMIODARONE HYDROCHLORIDE 400 MG: 200 TABLET ORAL at 16:59

## 2020-01-01 RX ADMIN — AMOXICILLIN AND CLAVULANATE POTASSIUM 1 TABLET: 875; 125 TABLET, FILM COATED ORAL at 21:19

## 2020-01-01 RX ADMIN — WARFARIN SODIUM 2 MG: 2 TABLET ORAL at 16:39

## 2020-01-01 RX ADMIN — ALBUMIN (HUMAN) 50 G: 0.25 INJECTION, SOLUTION INTRAVENOUS at 10:33

## 2020-01-01 RX ADMIN — LACTULOSE 30 G: 20 SOLUTION ORAL at 21:24

## 2020-01-01 RX ADMIN — PIPERACILLIN AND TAZOBACTAM 3.38 G: 3; .375 INJECTION, POWDER, LYOPHILIZED, FOR SOLUTION INTRAVENOUS at 21:17

## 2020-01-01 RX ADMIN — SODIUM CHLORIDE 500 ML: 9 INJECTION, SOLUTION INTRAVENOUS at 08:07

## 2020-01-01 RX ADMIN — ALBUMIN (HUMAN) 50 G: 0.25 INJECTION, SOLUTION INTRAVENOUS at 20:56

## 2020-01-01 RX ADMIN — PIPERACILLIN AND TAZOBACTAM 3.38 G: 3; .375 INJECTION, POWDER, LYOPHILIZED, FOR SOLUTION INTRAVENOUS at 04:09

## 2020-01-01 RX ADMIN — FUROSEMIDE 20 MG: 10 INJECTION, SOLUTION INTRAVENOUS at 15:20

## 2020-01-01 RX ADMIN — SODIUM CHLORIDE 500 ML: 9 INJECTION, SOLUTION INTRAVENOUS at 08:12

## 2020-01-01 RX ADMIN — SODIUM CHLORIDE 500 ML: 9 INJECTION, SOLUTION INTRAVENOUS at 10:56

## 2020-01-01 RX ADMIN — METOPROLOL TARTRATE 5 MG: 5 INJECTION INTRAVENOUS at 10:57

## 2020-01-01 RX ADMIN — ALBUTEROL SULFATE 2.5 MG: 2.5 SOLUTION RESPIRATORY (INHALATION) at 00:51

## 2020-01-01 RX ADMIN — SULFAMETHOXAZOLE AND TRIMETHOPRIM 1 TABLET: 800; 160 TABLET ORAL at 08:16

## 2020-01-01 RX ADMIN — CEFTRIAXONE SODIUM 1 G: 1 INJECTION, POWDER, FOR SOLUTION INTRAMUSCULAR; INTRAVENOUS at 11:39

## 2020-01-01 RX ADMIN — DOCUSATE SODIUM 100 MG: 100 CAPSULE ORAL at 10:36

## 2020-01-01 RX ADMIN — Medication 10 ML: at 08:16

## 2020-01-01 RX ADMIN — POTASSIUM CHLORIDE 10 MEQ: 7.46 INJECTION, SOLUTION INTRAVENOUS at 17:57

## 2020-01-01 RX ADMIN — SODIUM CHLORIDE: 9 INJECTION, SOLUTION INTRAVENOUS at 13:31

## 2020-01-01 RX ADMIN — AMIODARONE HYDROCHLORIDE 400 MG: 200 TABLET ORAL at 04:33

## 2020-01-01 RX ADMIN — AMIODARONE HYDROCHLORIDE 200 MG: 200 TABLET ORAL at 20:11

## 2020-01-01 RX ADMIN — SULFAMETHOXAZOLE AND TRIMETHOPRIM 1 TABLET: 800; 160 TABLET ORAL at 08:11

## 2020-01-01 RX ADMIN — VANCOMYCIN HYDROCHLORIDE 1000 MG: 1 INJECTION, POWDER, LYOPHILIZED, FOR SOLUTION INTRAVENOUS at 21:32

## 2020-01-01 RX ADMIN — DIGOXIN 125 MCG: 125 TABLET ORAL at 20:57

## 2020-01-01 RX ADMIN — ONDANSETRON 4 MG: 2 INJECTION INTRAMUSCULAR; INTRAVENOUS at 09:34

## 2020-01-01 RX ADMIN — Medication 10 ML: at 21:21

## 2020-01-01 RX ADMIN — SODIUM CHLORIDE 500 ML: 9 INJECTION, SOLUTION INTRAVENOUS at 14:56

## 2020-01-01 RX ADMIN — Medication 10 ML: at 21:40

## 2020-01-01 RX ADMIN — POTASSIUM CHLORIDE 10 MEQ: 7.46 INJECTION, SOLUTION INTRAVENOUS at 16:50

## 2020-01-01 RX ADMIN — SODIUM CHLORIDE: 9 INJECTION, SOLUTION INTRAVENOUS at 11:43

## 2020-01-01 RX ADMIN — FLUMAZENIL 0.4 MCG: 0.1 INJECTION INTRAVENOUS at 08:51

## 2020-01-01 RX ADMIN — ALBUMIN (HUMAN) 50 G: 0.25 INJECTION, SOLUTION INTRAVENOUS at 20:57

## 2020-01-01 RX ADMIN — MAGNESIUM SULFATE HEPTAHYDRATE 2 G: 40 INJECTION, SOLUTION INTRAVENOUS at 14:41

## 2020-01-01 RX ADMIN — DIGOXIN 125 MCG: 125 TABLET ORAL at 11:17

## 2020-01-01 RX ADMIN — IPRATROPIUM BROMIDE AND ALBUTEROL SULFATE 1 AMPULE: .5; 3 SOLUTION RESPIRATORY (INHALATION) at 11:00

## 2020-01-01 RX ADMIN — LACTULOSE 30 G: 20 SOLUTION ORAL at 21:02

## 2020-01-01 RX ADMIN — Medication 10 ML: at 08:43

## 2020-01-01 RX ADMIN — SODIUM CHLORIDE: 9 INJECTION, SOLUTION INTRAVENOUS at 04:04

## 2020-01-01 RX ADMIN — SODIUM CHLORIDE: 9 INJECTION, SOLUTION INTRAVENOUS at 12:27

## 2020-01-01 RX ADMIN — SODIUM CHLORIDE: 9 INJECTION, SOLUTION INTRAVENOUS at 10:33

## 2020-01-01 RX ADMIN — PIPERACILLIN AND TAZOBACTAM 3.38 G: 3; .375 INJECTION, POWDER, LYOPHILIZED, FOR SOLUTION INTRAVENOUS at 20:57

## 2020-01-01 RX ADMIN — DIGOXIN 125 MCG: 125 TABLET ORAL at 09:24

## 2020-01-01 RX ADMIN — ALBUTEROL SULFATE 2.5 MG: 2.5 SOLUTION RESPIRATORY (INHALATION) at 21:38

## 2020-01-01 RX ADMIN — Medication 10 ML: at 09:50

## 2020-01-01 RX ADMIN — DOCUSATE SODIUM 100 MG: 100 CAPSULE ORAL at 13:28

## 2020-01-01 RX ADMIN — ALBUMIN (HUMAN) 50 G: 0.25 INJECTION, SOLUTION INTRAVENOUS at 10:12

## 2020-01-01 RX ADMIN — ALBUMIN (HUMAN) 50 G: 0.25 INJECTION, SOLUTION INTRAVENOUS at 20:10

## 2020-01-01 RX ADMIN — Medication 10 ML: at 10:38

## 2020-01-01 RX ADMIN — ALBUTEROL SULFATE 2.5 MG: 2.5 SOLUTION RESPIRATORY (INHALATION) at 03:28

## 2020-01-01 RX ADMIN — PIPERACILLIN AND TAZOBACTAM 3.38 G: 3; .375 INJECTION, POWDER, LYOPHILIZED, FOR SOLUTION INTRAVENOUS at 14:20

## 2020-01-01 RX ADMIN — POTASSIUM CHLORIDE 10 MEQ: 7.46 INJECTION, SOLUTION INTRAVENOUS at 14:37

## 2020-01-01 RX ADMIN — AMIODARONE HYDROCHLORIDE 200 MG: 200 TABLET ORAL at 09:49

## 2020-01-01 RX ADMIN — Medication 2000 UNITS: at 17:41

## 2020-01-01 RX ADMIN — AMIODARONE HYDROCHLORIDE 400 MG: 200 TABLET ORAL at 16:25

## 2020-01-01 RX ADMIN — SODIUM CHLORIDE: 9 INJECTION, SOLUTION INTRAVENOUS at 20:57

## 2020-01-01 RX ADMIN — METOPROLOL TARTRATE 12.5 MG: 25 TABLET, FILM COATED ORAL at 21:03

## 2020-01-01 RX ADMIN — ONDANSETRON 4 MG: 2 INJECTION INTRAMUSCULAR; INTRAVENOUS at 10:32

## 2020-01-01 RX ADMIN — PIPERACILLIN AND TAZOBACTAM 3.38 G: 3; .375 INJECTION, POWDER, LYOPHILIZED, FOR SOLUTION INTRAVENOUS at 21:07

## 2020-01-01 RX ADMIN — AMIODARONE HYDROCHLORIDE 200 MG: 200 TABLET ORAL at 21:04

## 2020-01-01 RX ADMIN — ONDANSETRON 4 MG: 2 INJECTION INTRAMUSCULAR; INTRAVENOUS at 12:12

## 2020-01-01 RX ADMIN — AMIODARONE HYDROCHLORIDE 200 MG: 200 TABLET ORAL at 10:36

## 2020-01-01 RX ADMIN — SODIUM CHLORIDE 500 ML: 9 INJECTION, SOLUTION INTRAVENOUS at 22:30

## 2020-01-01 RX ADMIN — PIPERACILLIN AND TAZOBACTAM 3.38 G: 3; .375 INJECTION, POWDER, LYOPHILIZED, FOR SOLUTION INTRAVENOUS at 15:48

## 2020-01-01 RX ADMIN — MIDAZOLAM HYDROCHLORIDE 1 MG: 2 INJECTION, SOLUTION INTRAMUSCULAR; INTRAVENOUS at 08:37

## 2020-01-01 RX ADMIN — FUROSEMIDE 20 MG: 10 INJECTION, SOLUTION INTRAVENOUS at 05:19

## 2020-01-01 RX ADMIN — DIGOXIN 125 MCG: 125 TABLET ORAL at 18:51

## 2020-01-01 RX ADMIN — ALBUTEROL SULFATE 2.5 MG: 2.5 SOLUTION RESPIRATORY (INHALATION) at 16:59

## 2020-01-01 RX ADMIN — POTASSIUM CHLORIDE 10 MEQ: 7.46 INJECTION, SOLUTION INTRAVENOUS at 15:46

## 2020-01-01 RX ADMIN — BISACODYL 10 MG: 10 SUPPOSITORY RECTAL at 18:51

## 2020-01-01 RX ADMIN — Medication 500 ML: at 08:07

## 2020-01-01 RX ADMIN — MIDAZOLAM HYDROCHLORIDE 1 MG: 2 INJECTION, SOLUTION INTRAMUSCULAR; INTRAVENOUS at 08:38

## 2020-01-01 RX ADMIN — DOCUSATE SODIUM 100 MG: 100 CAPSULE ORAL at 09:49

## 2020-01-01 RX ADMIN — SODIUM CHLORIDE: 9 INJECTION, SOLUTION INTRAVENOUS at 19:16

## 2020-01-01 RX ADMIN — LACTULOSE 30 G: 20 SOLUTION ORAL at 13:28

## 2020-01-01 RX ADMIN — CYANOCOBALAMIN 1000 MCG: 1000 INJECTION, SOLUTION INTRAMUSCULAR; SUBCUTANEOUS at 17:01

## 2020-01-01 RX ADMIN — ALBUMIN (HUMAN) 50 G: 0.25 INJECTION, SOLUTION INTRAVENOUS at 09:24

## 2020-01-01 RX ADMIN — PIPERACILLIN AND TAZOBACTAM 3.38 G: 3; .375 INJECTION, POWDER, LYOPHILIZED, FOR SOLUTION INTRAVENOUS at 05:41

## 2020-01-01 RX ADMIN — ALBUMIN (HUMAN) 50 G: 0.25 INJECTION, SOLUTION INTRAVENOUS at 21:04

## 2020-01-01 RX ADMIN — Medication 10 ML: at 21:06

## 2020-01-01 RX ADMIN — ALBUTEROL SULFATE 2.5 MG: 2.5 SOLUTION RESPIRATORY (INHALATION) at 13:04

## 2020-01-01 RX ADMIN — PIPERACILLIN AND TAZOBACTAM 3.38 G: 3; .375 INJECTION, POWDER, LYOPHILIZED, FOR SOLUTION INTRAVENOUS at 22:37

## 2020-01-01 RX ADMIN — METOPROLOL TARTRATE 25 MG: 25 TABLET, FILM COATED ORAL at 20:18

## 2020-01-01 RX ADMIN — Medication 10 ML: at 11:22

## 2020-01-01 RX ADMIN — METHYLPREDNISOLONE SODIUM SUCCINATE 125 MG: 125 INJECTION, POWDER, FOR SOLUTION INTRAMUSCULAR; INTRAVENOUS at 10:57

## 2020-01-01 RX ADMIN — Medication 10 ML: at 21:08

## 2020-01-01 RX ADMIN — DOCUSATE SODIUM 100 MG: 100 CAPSULE ORAL at 11:17

## 2020-01-01 RX ADMIN — SODIUM CHLORIDE 500 ML: 9 INJECTION, SOLUTION INTRAVENOUS at 10:28

## 2020-01-01 RX ADMIN — PIPERACILLIN AND TAZOBACTAM 3.38 G: 3; .375 INJECTION, POWDER, LYOPHILIZED, FOR SOLUTION INTRAVENOUS at 20:11

## 2020-01-01 RX ADMIN — PIPERACILLIN AND TAZOBACTAM 3.38 G: 3; .375 INJECTION, POWDER, LYOPHILIZED, FOR SOLUTION INTRAVENOUS at 06:13

## 2020-01-01 RX ADMIN — AMIODARONE HYDROCHLORIDE 400 MG: 200 TABLET ORAL at 08:43

## 2020-01-01 RX ADMIN — ALBUTEROL SULFATE 2.5 MG: 2.5 SOLUTION RESPIRATORY (INHALATION) at 07:53

## 2020-01-01 RX ADMIN — ALBUMIN (HUMAN) 50 G: 0.25 INJECTION, SOLUTION INTRAVENOUS at 20:32

## 2020-01-01 RX ADMIN — Medication 10 ML: at 20:11

## 2020-01-01 RX ADMIN — LACTULOSE 30 G: 20 SOLUTION ORAL at 20:57

## 2020-01-01 RX ADMIN — SODIUM CHLORIDE 500 ML: 9 INJECTION, SOLUTION INTRAVENOUS at 02:47

## 2020-01-01 RX ADMIN — ALBUMIN (HUMAN) 50 G: 0.25 INJECTION, SOLUTION INTRAVENOUS at 08:42

## 2020-01-01 RX ADMIN — PIPERACILLIN AND TAZOBACTAM 3.38 G: 3; .375 INJECTION, POWDER, LYOPHILIZED, FOR SOLUTION INTRAVENOUS at 08:17

## 2020-01-01 RX ADMIN — Medication 8.8 MG: at 17:58

## 2020-01-01 RX ADMIN — PIPERACILLIN AND TAZOBACTAM 3.38 G: 3; .375 INJECTION, POWDER, LYOPHILIZED, FOR SOLUTION INTRAVENOUS at 14:05

## 2020-01-01 RX ADMIN — Medication 10 ML: at 08:12

## 2020-01-01 RX ADMIN — GADOBENATE DIMEGLUMINE 15 ML: 529 INJECTION, SOLUTION INTRAVENOUS at 19:42

## 2020-01-01 RX ADMIN — PIPERACILLIN AND TAZOBACTAM 3.38 G: 3; .375 INJECTION, POWDER, LYOPHILIZED, FOR SOLUTION INTRAVENOUS at 15:42

## 2020-01-01 RX ADMIN — DEXTROSE AND SODIUM CHLORIDE: 5; 900 INJECTION, SOLUTION INTRAVENOUS at 13:29

## 2020-01-01 RX ADMIN — AMIODARONE HYDROCHLORIDE 400 MG: 200 TABLET ORAL at 20:56

## 2020-01-01 RX ADMIN — Medication 2000 UNITS: at 16:17

## 2020-01-01 RX ADMIN — Medication 10 ML: at 21:51

## 2020-01-01 RX ADMIN — METOPROLOL TARTRATE 25 MG: 25 TABLET, FILM COATED ORAL at 08:16

## 2020-01-01 RX ADMIN — FENTANYL CITRATE 12.5 MCG: 50 INJECTION, SOLUTION INTRAMUSCULAR; INTRAVENOUS at 08:36

## 2020-01-01 RX ADMIN — AZITHROMYCIN MONOHYDRATE 500 MG: 500 INJECTION, POWDER, LYOPHILIZED, FOR SOLUTION INTRAVENOUS at 18:56

## 2020-01-01 RX ADMIN — PIPERACILLIN AND TAZOBACTAM 3.38 G: 3; .375 INJECTION, POWDER, LYOPHILIZED, FOR SOLUTION INTRAVENOUS at 13:08

## 2020-01-01 RX ADMIN — BISACODYL 10 MG: 10 SUPPOSITORY RECTAL at 10:36

## 2020-01-01 RX ADMIN — ALBUMIN (HUMAN) 50 G: 0.25 INJECTION, SOLUTION INTRAVENOUS at 09:23

## 2020-01-01 RX ADMIN — SODIUM CHLORIDE: 9 INJECTION, SOLUTION INTRAVENOUS at 05:11

## 2020-01-01 RX ADMIN — METOPROLOL TARTRATE 12.5 MG: 25 TABLET, FILM COATED ORAL at 20:56

## 2020-01-01 RX ADMIN — AMIODARONE HYDROCHLORIDE 400 MG: 200 TABLET ORAL at 04:18

## 2020-01-01 RX ADMIN — Medication 8.8 MG: at 20:11

## 2020-01-01 RX ADMIN — ALBUTEROL SULFATE 2.5 MG: 2.5 SOLUTION RESPIRATORY (INHALATION) at 12:56

## 2020-01-01 RX ADMIN — AMOXICILLIN AND CLAVULANATE POTASSIUM 1 TABLET: 875; 125 TABLET, FILM COATED ORAL at 08:18

## 2020-01-01 RX ADMIN — Medication 10 ML: at 08:18

## 2020-01-01 RX ADMIN — AZITHROMYCIN MONOHYDRATE 500 MG: 500 INJECTION, POWDER, LYOPHILIZED, FOR SOLUTION INTRAVENOUS at 12:07

## 2020-01-01 RX ADMIN — SULFAMETHOXAZOLE AND TRIMETHOPRIM 1 TABLET: 800; 160 TABLET ORAL at 21:51

## 2020-01-01 RX ADMIN — Medication 2000 UNITS: at 17:58

## 2020-01-01 RX ADMIN — SODIUM CHLORIDE 500 ML: 9 INJECTION, SOLUTION INTRAVENOUS at 16:48

## 2020-01-01 RX ADMIN — IOPAMIDOL 100 ML: 612 INJECTION, SOLUTION INTRAVENOUS at 03:30

## 2020-01-01 RX ADMIN — Medication 10 ML: at 21:00

## 2020-01-01 RX ADMIN — LACTULOSE 30 G: 20 SOLUTION ORAL at 11:17

## 2020-01-01 RX ADMIN — Medication 100 MG: at 09:49

## 2020-01-01 RX ADMIN — PIPERACILLIN AND TAZOBACTAM 3.38 G: 3; .375 INJECTION, POWDER, LYOPHILIZED, FOR SOLUTION INTRAVENOUS at 05:15

## 2020-01-01 RX ADMIN — PHYTONADIONE 1 MG: 1 INJECTION, EMULSION INTRAMUSCULAR; INTRAVENOUS; SUBCUTANEOUS at 14:56

## 2020-01-01 RX ADMIN — LACTULOSE 30 G: 20 SOLUTION ORAL at 20:32

## 2020-01-01 RX ADMIN — PIPERACILLIN AND TAZOBACTAM 3.38 G: 3; .375 INJECTION, POWDER, LYOPHILIZED, FOR SOLUTION INTRAVENOUS at 21:06

## 2020-01-01 RX ADMIN — METOPROLOL TARTRATE 12.5 MG: 25 TABLET, FILM COATED ORAL at 20:57

## 2020-01-01 RX ADMIN — PIPERACILLIN AND TAZOBACTAM 3.38 G: 3; .375 INJECTION, POWDER, LYOPHILIZED, FOR SOLUTION INTRAVENOUS at 22:36

## 2020-01-01 RX ADMIN — ACETAMINOPHEN 650 MG: 325 TABLET ORAL at 20:18

## 2020-01-01 RX ADMIN — SODIUM CHLORIDE: 9 INJECTION, SOLUTION INTRAVENOUS at 13:40

## 2020-01-01 RX ADMIN — PIPERACILLIN AND TAZOBACTAM 3.38 G: 3; .375 INJECTION, POWDER, LYOPHILIZED, FOR SOLUTION INTRAVENOUS at 11:17

## 2020-01-01 SDOH — SOCIAL STABILITY: SOCIAL INSECURITY
WITHIN THE LAST YEAR, HAVE YOU BEEN KICKED, HIT, SLAPPED, OR OTHERWISE PHYSICALLY HURT BY YOUR PARTNER OR EX-PARTNER?: NO

## 2020-01-01 SDOH — SOCIAL STABILITY: SOCIAL NETWORK
IN A TYPICAL WEEK, HOW MANY TIMES DO YOU TALK ON THE PHONE WITH FAMILY, FRIENDS, OR NEIGHBORS?: MORE THAN THREE TIMES A WEEK

## 2020-01-01 SDOH — SOCIAL STABILITY: SOCIAL INSECURITY: WITHIN THE LAST YEAR, HAVE YOU BEEN AFRAID OF YOUR PARTNER OR EX-PARTNER?: NO

## 2020-01-01 SDOH — ECONOMIC STABILITY: TRANSPORTATION INSECURITY
IN THE PAST 12 MONTHS, HAS LACK OF TRANSPORTATION KEPT YOU FROM MEETINGS, WORK, OR FROM GETTING THINGS NEEDED FOR DAILY LIVING?: NO

## 2020-01-01 SDOH — ECONOMIC STABILITY: FOOD INSECURITY: WITHIN THE PAST 12 MONTHS, YOU WORRIED THAT YOUR FOOD WOULD RUN OUT BEFORE YOU GOT MONEY TO BUY MORE.: NEVER TRUE

## 2020-01-01 SDOH — SOCIAL STABILITY: SOCIAL NETWORK: HOW OFTEN DO YOU GET TOGETHER WITH FRIENDS OR RELATIVES?: MORE THAN THREE TIMES A WEEK

## 2020-01-01 SDOH — ECONOMIC STABILITY: TRANSPORTATION INSECURITY
IN THE PAST 12 MONTHS, HAS THE LACK OF TRANSPORTATION KEPT YOU FROM MEDICAL APPOINTMENTS OR FROM GETTING MEDICATIONS?: NO

## 2020-01-01 SDOH — SOCIAL STABILITY: SOCIAL NETWORK
DO YOU BELONG TO ANY CLUBS OR ORGANIZATIONS SUCH AS CHURCH GROUPS UNIONS, FRATERNAL OR ATHLETIC GROUPS, OR SCHOOL GROUPS?: NO

## 2020-01-01 SDOH — SOCIAL STABILITY: SOCIAL NETWORK: ARE YOU MARRIED, WIDOWED, DIVORCED, SEPARATED, NEVER MARRIED, OR LIVING WITH A PARTNER?: WIDOWED

## 2020-01-01 SDOH — HEALTH STABILITY: PHYSICAL HEALTH: ON AVERAGE, HOW MANY MINUTES DO YOU ENGAGE IN EXERCISE AT THIS LEVEL?: 0 MIN

## 2020-01-01 SDOH — SOCIAL STABILITY: SOCIAL INSECURITY
WITHIN THE LAST YEAR, HAVE TO BEEN RAPED OR FORCED TO HAVE ANY KIND OF SEXUAL ACTIVITY BY YOUR PARTNER OR EX-PARTNER?: NO

## 2020-01-01 SDOH — SOCIAL STABILITY: SOCIAL NETWORK: HOW OFTEN DO YOU ATTEND CHURCH OR RELIGIOUS SERVICES?: NEVER

## 2020-01-01 SDOH — HEALTH STABILITY: MENTAL HEALTH
STRESS IS WHEN SOMEONE FEELS TENSE, NERVOUS, ANXIOUS, OR CAN'T SLEEP AT NIGHT BECAUSE THEIR MIND IS TROUBLED. HOW STRESSED ARE YOU?: ONLY A LITTLE

## 2020-01-01 SDOH — HEALTH STABILITY: PHYSICAL HEALTH: ON AVERAGE, HOW MANY DAYS PER WEEK DO YOU ENGAGE IN MODERATE TO STRENUOUS EXERCISE (LIKE A BRISK WALK)?: 0 DAYS

## 2020-01-01 SDOH — SOCIAL STABILITY: SOCIAL NETWORK: HOW OFTEN DO YOU ATTENT MEETINGS OF THE CLUB OR ORGANIZATION YOU BELONG TO?: NEVER

## 2020-01-01 SDOH — SOCIAL STABILITY: SOCIAL INSECURITY: WITHIN THE LAST YEAR, HAVE YOU BEEN HUMILIATED OR EMOTIONALLY ABUSED IN OTHER WAYS BY YOUR PARTNER OR EX-PARTNER?: NO

## 2020-01-01 SDOH — ECONOMIC STABILITY: FOOD INSECURITY: WITHIN THE PAST 12 MONTHS, THE FOOD YOU BOUGHT JUST DIDN'T LAST AND YOU DIDN'T HAVE MONEY TO GET MORE.: NEVER TRUE

## 2020-01-01 ASSESSMENT — PAIN SCALES - GENERAL
PAINLEVEL_OUTOF10: 2
PAINLEVEL_OUTOF10: 0
PAINLEVEL_OUTOF10: 7
PAINLEVEL_OUTOF10: 0
PAINLEVEL_OUTOF10: 8
PAINLEVEL_OUTOF10: 0
PAINLEVEL_OUTOF10: 0
PAINLEVEL_OUTOF10: 5
PAINLEVEL_OUTOF10: 0
PAINLEVEL_OUTOF10: 10
PAINLEVEL_OUTOF10: 0
PAINLEVEL_OUTOF10: 5

## 2020-01-01 ASSESSMENT — ENCOUNTER SYMPTOMS
DIARRHEA: 0
VOMITING: 0
APNEA: 0
PHOTOPHOBIA: 0
NAUSEA: 1
BACK PAIN: 1
VOICE CHANGE: 0
NAUSEA: 0
RESPIRATORY NEGATIVE: 1
ABDOMINAL DISTENTION: 1
HEMOPTYSIS: 0
ABDOMINAL PAIN: 1
ABDOMINAL PAIN: 0
STRIDOR: 0
WHEEZING: 0
PHOTOPHOBIA: 0
VOMITING: 0
BACK PAIN: 1
BOWEL INCONTINENCE: 1
ABDOMINAL DISTENTION: 0
COUGH: 0
EYE DISCHARGE: 0
BLOOD IN STOOL: 0
ABDOMINAL PAIN: 0
RESPIRATORY NEGATIVE: 1
WHEEZING: 0
EYE PAIN: 0
RESPIRATORY NEGATIVE: 1
ANAL BLEEDING: 0
SORE THROAT: 0
ABDOMINAL PAIN: 1
COUGH: 0
RESPIRATORY NEGATIVE: 1
VOMITING: 0
VOMITING: 0
SHORTNESS OF BREATH: 0
EYE REDNESS: 0
COLOR CHANGE: 1
COUGH: 0
ABDOMINAL PAIN: 1
SORE THROAT: 0
COUGH: 0
WHEEZING: 0
SHORTNESS OF BREATH: 1
GASTROINTESTINAL NEGATIVE: 1
NAUSEA: 0
VOMITING: 1
SHORTNESS OF BREATH: 1
ABDOMINAL PAIN: 1
CONSTIPATION: 1
RESPIRATORY NEGATIVE: 1
ANAL BLEEDING: 0
ABDOMINAL PAIN: 0
PHOTOPHOBIA: 0
SPUTUM PRODUCTION: 0
SWOLLEN GLANDS: 0

## 2020-01-01 ASSESSMENT — PAIN DESCRIPTION - LOCATION
LOCATION: ABDOMEN
LOCATION: HIP
LOCATION: BACK;BUTTOCKS;SACRUM

## 2020-01-01 ASSESSMENT — PAIN DESCRIPTION - ONSET: ONSET: ON-GOING

## 2020-01-01 ASSESSMENT — PAIN DESCRIPTION - ORIENTATION
ORIENTATION: LOWER
ORIENTATION: LEFT
ORIENTATION: LOWER
ORIENTATION: LOWER

## 2020-01-01 ASSESSMENT — PAIN DESCRIPTION - DESCRIPTORS
DESCRIPTORS: DULL
DESCRIPTORS: BURNING;SHARP
DESCRIPTORS: ACHING
DESCRIPTORS: BURNING;SHARP

## 2020-01-01 ASSESSMENT — PAIN DESCRIPTION - PAIN TYPE
TYPE: ACUTE PAIN

## 2020-01-01 ASSESSMENT — PAIN DESCRIPTION - FREQUENCY
FREQUENCY: CONTINUOUS
FREQUENCY: CONTINUOUS

## 2020-01-12 PROBLEM — J18.9 PNEUMONIA: Status: ACTIVE | Noted: 2020-01-01

## 2020-01-12 NOTE — PROGRESS NOTES
Patient arrived from [address] [place] [address] alert and oriented x 4. Lung sounds diminished with crackles in upper lobes and left lower lobe. No signs of distress and no voiced complaints at this time. Oxygen @ 3 liters nasal cannula, B/p 106/79, hr 100, respirations 22, pulse ox 97%. Patient currently has a 500 ml normal saline bolus infusing which was started while pt.was on 2W. Pt. Oriented to room and call button. Pt. placed on falls precautions and reminded to call for staff assist before getting out of bed. Call light in reach.

## 2020-01-12 NOTE — FLOWSHEET NOTE
1515  Patient up to the floor from ER.   1525  Vitals obtained. Blood pressure is 72/52. RN notified provider. Patient will be going to the ICU due to already having 3 Liters of fluids and no improvement. No beds currently. Jorge L Fraser RN  aware that she needs to go to the ICU. New orders placed. 1615  Bolus orders placed by physician. RN listened to the patient's lungs. Left side has crackles and is diminished throughout the entire lung, and the right is just diminished. RN notified Jorge L Fraser RN  and physician was perfect served about adding more fluids. Patient transferring to ICU bed 14.  1645  Report called to Butch in the ICU.

## 2020-01-12 NOTE — PROGRESS NOTES
Pharmacy Note  Vancomycin Consult    Ayan Lawrence is a 80 y.o. female started on Vancomycin for pneumonia; consult received from Dr. Kaye Bunch to manage therapy. Also receiving the following antibiotics: Zosyn. Patient Active Problem List   Diagnosis    Abrasion of buttock    Pneumonia       Allergies:  Carbamazepine and analogs and Codeine     Temp max: 100.8    Recent Labs     01/12/20  1147   BUN 22       Recent Labs     01/12/20  1147   CREATININE 0.71       Recent Labs     01/12/20  1030   WBC 8.4       No intake or output data in the 24 hours ending 01/12/20 1547    Culture Date      Source                       Results  Procedure Component Value Units Date/Time   RAPID INFLUENZA A/B ANTIGENS [[id number]]    Order Status: No result Specimen: Nasopharyngeal    Culture Blood #1 [[id number]] Collected: 01/12/20 1100   Order Status: Sent Specimen: Blood Updated: 01/12/20 1105   Culture Blood #2 [[id number]] Collected: 01/12/20 1100   Order Status: Sent Specimen: Blood Updated: 01/12/20 1105   Rapid Influenza A/B Antigens [[id number]] Collected: 01/12/20 1042   Order Status: Completed Specimen: Nasopharyngeal Updated: 01/12/20 1102    Influenza A by PCR Negative    Comment: Effective 8/22/19   Please note methodology and/or reference ranges have changed. Influenza B by PCR Negative    Comment: Effective 8/22/19   Please note methodology and/or reference ranges have changed. Ht Readings from Last 1 Encounters:   01/12/20 5' 1\" (1.549 m)        Wt Readings from Last 1 Encounters:   01/12/20 163 lb (73.9 kg)         Body mass index is 30.8 kg/m². Estimated Creatinine Clearance: 52 mL/min (based on SCr of 0.71 mg/dL). Goal Trough Level: 10-20 mcg/mL    Assessment/Plan:  Will initiate vancomycin 1,000 mg IV every 24 hours, based on patient's weight and renal function. Obtain trough 30 minutes prior to the 4th dose, on 01/15/20 at 1600. Thank you for the consult.   Will continue to follow.     Mita BrionesD   1/12/2020 3:50 PM

## 2020-01-12 NOTE — H&P
Hospital Medicine  History and Physical    Patient:  Becca Musa  MRN: [id number]    CHIEF COMPLAINT:    Chief Complaint   Patient presents with    Shortness of Breath    Emesis       History Obtained From:  Patient, EMR  Primary Care Physician: No primary care provider on file. HISTORY OF PRESENT ILLNESS:   Patient is an 70-year-old female with a past medical history of A. fib, asthma, arthritis who presents with feeling ill for the last 3 to 4 days. She also has shortness of breath. She had one episode of emesis in the emergency room. She reports that she feels achy all over. She has not been eating well the last couple days. She has dry cough. In the ED, her troponin was negative, BNP is about 2000, no leukocytosis, no fever, her blood pressure was in the 44B to 39J systolic, improved with hydration. Lactate is 2.8. Patient denies chest pain. No abdominal pain. Past Medical History:      Diagnosis Date    Arthritis     Asthma     Atrial fibrillation ([address]     History of blood transfusion     Pneumonia        Past Surgical History:      Procedure Laterality Date    COLONOSCOPY      JOINT REPLACEMENT Bilateral     bilateral femur replacements       Medications Prior to Admission:    Prior to Admission medications    Medication Sig Start Date End Date Taking? Authorizing Provider   furosemide (LASIX) 20 MG tablet Take 1 tablet by mouth daily for 4 days 9/18/19 9/22/19  Christiano Quevedo PA-C   clotrimazole-betamethasone (LOTRISONE) 1-0.05 % cream Apply topically 2 times daily. 8/15/18   Forrestine Samples, MD   clotrimazole-betamethasone (LOTRISONE) 1-0.05 % cream Apply topically 2 times daily. 7/25/18   Forrestine Samples, MD   diltiazem (CARTIA XT) 240 MG extended release capsule TAKE ONE CAPSULE BY MOUTH EVERY DAY.  DISCONTINUE 120MG CAPSULES 6/11/18   Historical Provider, MD   fluticasone-salmeterol (ADVAIR DISKUS) 250-50 MCG/DOSE AEPB Inhale 1 puff into the lungs 12/28/17   Historical Provider, MD   warfarin (COUMADIN) 5 MG tablet Take 5 mg by mouth 7/27/17   Historical Provider, MD   ALPRAZolam Cheryl Simple) 0.5 MG tablet Take 0.5 mg by mouth nightly as needed for Sleep. Sha Herron Historical Provider, MD   Cholecalciferol (VITAMIN D3) 2000 units CAPS Take by mouth    Historical Provider, MD       Allergies:  Carbamazepine and analogs and Codeine    Social History:   TOBACCO:   reports that she has never smoked. She has never used smokeless tobacco.  ETOH:   reports current alcohol use. Family History:       Problem Relation Age of Onset    High Blood Pressure Mother     Osteoporosis Mother     Other Brother        REVIEW OF SYSTEMS:  Ten systems reviewed and negative except for stated in HPI    Physical Exam:    Vitals: BP 83/70   Pulse 99   Temp 100.8 °F (38.2 °C)   Resp 20   Ht 5' 1\" (1.549 m)   Wt 163 lb (73.9 kg)   LMP  (LMP Unknown)   SpO2 97%   BMI 30.80 kg/m²   General appearance: alert, appears stated age and cooperative  Skin: Skin color, texture, turgor normal. No rashes or lesions  HEENT: Head: Normocephalic, no lesions, without obvious abnormality. . No dental issues   Neck: no adenopathy, no carotid bruit, no JVD, supple, symmetrical, trachea midline and thyroid not enlarged, symmetric, no tenderness/mass/nodules  Lungs: clear to auscultation bilaterally  Heart: regular rate and rhythm, S1, S2 normal, no murmur, click, rub or gallop  Abdomen: soft, non-tender; bowel sounds normal; no masses,  no organomegaly  Extremities: extremities normal, atraumatic, no cyanosis or edema  Neurologic: Mental status: Alert, oriented, thought content appropriate.    CN 2-12 intact     Recent Labs     01/12/20  1030   WBC 8.4   HGB 15.8        Recent Labs     01/12/20  1147      K 4.1      CO2 22   BUN 22   CREATININE 0.71   GLUCOSE 81   *   *   BILITOT 1.4*   ALKPHOS 153*     Troponin T:   Recent Labs     01/12/20  1030   TROPONINI <0.010       ABGs: No results found for: PHART, PO2ART, NTQ7OHD  INR:   Recent Labs     20  1030   INR 2.7     URINALYSIS:No results for input(s): NITRITE, COLORU, PHUR, LABCAST, WBCUA, RBCUA, MUCUS, TRICHOMONAS, YEAST, BACTERIA, CLARITYU, SPECGRAV, LEUKOCYTESUR, UROBILINOGEN, BILIRUBINUR, BLOODU, GLUCOSEU, AMORPHOUS in the last 72 hours. Invalid input(s): Clara Greenwoodta  -----------------------------------------------------------------   Xr Chest Portable    Result Date: 2020  Patient MRN: [id number] : 1933 Age:  80 years Gender: Female Order Date: 2020 10:30 AM. Exam: XR CHEST PORTABLE Number of Views: 1 Indication:  Shortness of breath Comparison: 2019 Findings: Stable cardiomediastinal silhouette with underlying pulmonary edema and left basilar airspace disease. Severe bilateral glenohumeral osteoarthritis. No pneumothorax. Impression:  1. Stable cardiomediastinal silhouette with underlying pulmonary edema. 2. Suspected left basilar infiltrate/pneumonia. 3. Severe bilateral glenohumeral osteoarthritis. Assessment and Plan   1. Pneumonia-start IV Rocephin and azithromycin, follow-up blood culture  2. ?  Viral syndrome-rule out influenza infection, IV hydration, supportive care  3. Dehydration due to poor p.o. intake-start IV hydration, monitor electrolytes and replete as needed  4. A. Fib-resume home medication, she is on Cardizem and Coumadin, will consult pharmacy for Coumadin dosing  5. Diet: Regular diet  6. DVT ppx: Already on Coumadin  7. Disposition: Dependent on hospital course. Will discharge once medically stable. SW on board for discharge planning.      Patient Active Problem List   Diagnosis Code    Abrasion of buttock S30.810A    Pneumonia J18.9       Nicola Mcqueen MD  Admitting Hospitalist    Emergency Contact:

## 2020-01-12 NOTE — ED PROVIDER NOTES
throat. Respiratory: Positive for shortness of breath. Negative for cough, hemoptysis, sputum production and wheezing. Cardiovascular: Negative. Negative for chest pain, claudication, syncope and PND. Gastrointestinal: Positive for vomiting. Negative for abdominal pain. Musculoskeletal: Negative for neck pain. Skin: Negative. Negative for rash. Neurological: Negative for headaches. Psychiatric/Behavioral: Negative. All other systems reviewed and are negative. Except as noted above the remainder of the review of systems was reviewed and negative. PAST MEDICAL HISTORY     Past Medical History:   Diagnosis Date    Arthritis     Asthma     Atrial fibrillation ([facility]     History of blood transfusion     Pneumonia          SURGICALHISTORY       Past Surgical History:   Procedure Laterality Date    COLONOSCOPY      JOINT REPLACEMENT Bilateral     bilateral femur replacements         CURRENT MEDICATIONS       Previous Medications    ALPRAZOLAM (XANAX) 0.5 MG TABLET    Take 0.5 mg by mouth nightly as needed for Sleep. .    CHOLECALCIFEROL (VITAMIN D3) 2000 UNITS CAPS    Take by mouth    CLOTRIMAZOLE-BETAMETHASONE (LOTRISONE) 1-0.05 % CREAM    Apply topically 2 times daily. CLOTRIMAZOLE-BETAMETHASONE (LOTRISONE) 1-0.05 % CREAM    Apply topically 2 times daily. DILTIAZEM (CARTIA XT) 240 MG EXTENDED RELEASE CAPSULE    TAKE ONE CAPSULE BY MOUTH EVERY DAY.  DISCONTINUE 120MG CAPSULES    FLUTICASONE-SALMETEROL (ADVAIR DISKUS) 250-50 MCG/DOSE AEPB    Inhale 1 puff into the lungs    FUROSEMIDE (LASIX) 20 MG TABLET    Take 1 tablet by mouth daily for 4 days    WARFARIN (COUMADIN) 5 MG TABLET    Take 5 mg by mouth       ALLERGIES     Carbamazepine and analogs and Codeine    FAMILY HISTORY       Family History   Problem Relation Age of Onset    High Blood Pressure Mother     Osteoporosis Mother     Other Brother           SOCIAL HISTORY       Social History     Socioeconomic History    Normal range of motion and neck supple. Cardiovascular:      Rate and Rhythm: Normal rate and regular rhythm. Pulses: Normal pulses. Pulmonary:      Effort: Pulmonary effort is normal.      Breath sounds: Wheezing and rhonchi present. Abdominal:      General: Abdomen is flat. Bowel sounds are normal.   Musculoskeletal: Normal range of motion. Skin:     General: Skin is dry. Capillary Refill: Capillary refill takes less than 2 seconds. Neurological:      Mental Status: She is alert. Psychiatric:         Mood and Affect: Mood normal.         Behavior: Behavior normal.         Thought Content: Thought content normal.         Judgment: Judgment normal.         DIAGNOSTIC RESULTS     EKG: All EKG's are interpreted by the Emergency Department Physician who either signs or Co-signsthis chart in the absence of a cardiologist.    EKG: Sinus tachycardia with frequent PVCs heart rate 121 old inferior infarct no change from previous EKG. RADIOLOGY:   Non-plain filmimages such as CT, Ultrasound and MRI are read by the radiologist. Plain radiographic images are visualized and preliminarily interpreted by the emergency physician with the below findings:       Interpretation per the Radiologist below, if available at the time ofthis note:    XR CHEST PORTABLE   Final Result   Impression:     1. Stable cardiomediastinal silhouette with underlying pulmonary edema. 2. Suspected left basilar infiltrate/pneumonia. 3. Severe bilateral glenohumeral osteoarthritis.                ED BEDSIDE ULTRASOUND:   Performed by ED Physician - none    LABS:  Labs Reviewed   LACTIC ACID, PLASMA - Abnormal; Notable for the following components:       Result Value    Lactic Acid 2.8 (*)     All other components within normal limits   PROCALCITONIN - Abnormal; Notable for the following components:    Procalcitonin 0.21 (*)     All other components within normal limits   CBC WITH AUTO DIFFERENTIAL - Abnormal; Notable for the patient was completely asymptomatic was awake alert oriented in no apparent distress. Will be admitted under the hospitalist service for pneumonia dehydration nausea and vomiting       Amount and/or Complexity of Data Reviewed  Clinical lab tests: ordered and reviewed  Tests in the radiology section of CPT®: ordered and reviewed    Risk of Complications, Morbidity, and/or Mortality  Presenting problems: high  Diagnostic procedures: high  Management options: moderate    Critical Care  Total time providing critical care: 30-74 minutes    Patient Progress  Patient progress: improved      CONSULTS:  None    PROCEDURES:  Unless otherwise noted below, none     Procedures    FINAL IMPRESSION      1. Pneumonia of left lower lobe due to infectious organism ([facility]    2. Chronic obstructive pulmonary disease with acute lower respiratory infection ([facility]    3. Nausea and vomiting, intractability of vomiting not specified, unspecified vomiting type    4. Dehydration          DISPOSITION/PLAN   DISPOSITION Decision To Admit 01/12/2020 12:18:40 PM      PATIENT REFERRED TO:  No follow-up provider specified.     DISCHARGE MEDICATIONS:  New Prescriptions    No medications on file          (Please note thatportions of this note were completed with a voice recognition program.  Efforts were made to edit the dictations but occasionally words are mis-transcribed.)    Lulu Carson MD (electronically signed)  Attending Emergency Physician          Violette Pineda MD  01/12/20 [phone]

## 2020-01-13 NOTE — CARE COORDINATION
[facility] EMERGENCY [facility] [place] Case Management Initial Discharge Assessment    Met with Patient to discuss discharge plan. PCP: RANDALL Persaud  Date of Last Visit:  12/23/19    Discharge Planning    Living Arrangements: independently at home    Who do you live with? ALONE    Who helps you with your care:  self    If lives at home:     Do you have any barriers navigating in your home? no    Patient can perform ADL? Yes    Current Services (outpatient and in home) :  [facility] and [address] [facility]    Dialysis: No    Is transportation available to get to your appointments? Yes DTR DRIVES    DME Equipment:  yes - [facility], [address]    Respiratory equipment: PRN/HS Oxygen 2 Liters    Respiratory provider:  yes - [address] [place] [address] Street:  yes - [place] [address], [facility] with Medication Assistance Program?  No      Patient agreeable to [facility]? Declined PT DECLINES AT THIS TIME    Patient agreeable to SNF/Rehab? No    Other discharge needs identified? N/A    Freedom of choice list provided with basic dialogue that supports the patient's individualized plan of care/goals and shares the quality data associated with the providers. N/A    Does Patient Have a High-Risk for Readmission Diagnosis (CHF, PN, MI, COPD)? Yes, see care coordinator assessment    If Yes,     Consult with pulmonologist? Yes   Consult with cardiologist? No   Cardiac Rehab referral if EF <35%? No   Consult with Pharmacy for medication assessment prior to discharge? No   Consult with Behavioral health to aid in depression, anxiety, or coping issues? No   Palliative Care Consult? No   Pulmonary Rehab order for COPD, PN, and CHF (if EF > 35%)? Declined    Does patient have a reliable scale and know how to read it (for CHF)? N/A   Nutrition consult for CHF?  N/A   Respiratory therapy consult that includes bedside instruction on administration of nebulizers and/or inhalers, and assessment of oxygen and equipment needs in the home? Yes    The plan for Transition of Care is related to the following treatment [address]    Initial Discharge Plan? (Note: please see concurrent daily documentation for any updates after initial note). 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 PT STATES SHE FEELS WEAK, AWARE PT/OT SHOULD BE ORDERED, I OFFERED HHC AND PT POLITELY DECLINED. PT STATES SHE FEELS SAFE AT HOME AND CAN TAKE CARE OF HERSELF, ALSO HAS CO. HIRE ONCE A WEEK AND NOW THAT HER DTR IS RETIRE SHE CAN ALSO HELP HER MORE. WILL FOLLOW.       The Patient and/or patient representative: PT was provided with choice of any post-acute providers for care and equipment and agrees with discharge plan  Yes    Electronically signed by Nolberto Pizarro RN on 1/13/2020 at [phone] AM

## 2020-01-13 NOTE — PROGRESS NOTES
Clinical Pharmacy Note    Warfarin consult follow-up    Recent Labs     01/13/20  0511   INR 4.2     Recent Labs     01/12/20  1030 01/13/20  0511   HGB 15.8 13.4   HCT 48.9* 41.8    156       Significant drug:drug interactions:  APAP, zosyn    Notes:  Date INR Warfarin Dose   01-12-20 3.5 HOLD     01/13/20 4.2  HOLD                                               Will HOLD warfarin today due to supratherapeutic INR or 4.2 (goal 2-3). Daily PT/INR until stable within therapeutic range.     Diana Rosales, PharmD  1/13/2020 9:22 AM

## 2020-01-13 NOTE — PLAN OF CARE
Problem: Falls - Risk of:  Goal: Will remain free from falls  Description  Will remain free from falls  Outcome: Ongoing  Goal: Absence of physical injury  Description  Absence of physical injury  Outcome: Ongoing     Problem: Risk for Impaired Skin Integrity  Goal: Tissue integrity - skin and mucous membranes  Description  Structural intactness and normal physiological function of skin and  mucous membranes.   Outcome: Ongoing     Problem: Discharge Planning:  Goal: Discharged to appropriate level of care  Description  Discharged to appropriate level of care  Outcome: Ongoing  Goal: Participates in care planning  Description  Participates in care planning  Outcome: Ongoing     Problem: Airway Clearance - Ineffective:  Goal: Clear lung sounds  Description  Clear lung sounds  Outcome: Ongoing  Goal: Ability to maintain a clear airway will improve  Description  Ability to maintain a clear airway will improve  Outcome: Ongoing     Problem: Fluid Volume - Deficit:  Goal: Achieves intake and output within specified parameters  Description  Achieves intake and output within specified parameters  Outcome: Ongoing     Problem: Gas Exchange - Impaired:  Goal: Levels of oxygenation will improve  Description  Levels of oxygenation will improve  Outcome: Ongoing     Problem: Hyperthermia:  Goal: Ability to maintain a body temperature in the normal range will improve  Description  Ability to maintain a body temperature in the normal range will improve  Outcome: Ongoing

## 2020-01-13 NOTE — PROGRESS NOTES
Pt assisted up in bed for breakfast. No c/o at this time. Denies chest pain or SOB. O2  2L maintained. Pt is alert and oriented x 3.

## 2020-01-13 NOTE — PROGRESS NOTES
[facility] [place] Respiratory Therapy Evaluation   Current Order:  Albuterol q6 prn     Home Regimen:      Ordering Physician:   Re-evaluation Date:      Diagnosis:      Patient Status: Stable     The following MDI Criteria must be met in order to convert aerosol to MDI with spacer. If unable to meet, MDI will be converted to aerosol:  []  Patient able to demonstrate the ability to use MDI effectively  []  Patient alert and cooperative  []  Patient able to take deep breath with 5-10 second hold  []  Medication(s) available in this delivery method   []  Peak flow greater than or equal to 200 ml/min            Current Order Substituted To  (same drug, same frequency)   Aerosol to MDI [] Albuterol Sulfate 0.083% unit dose by aerosol Albuterol Sulfate MDI 2 puffs by inhalation with spacer    [] Levalbuterol 1.25 mg unit dose by aerosol Levalbuterol MDI 2 puffs by inhalation with spacer    [] Levalbuterol 0.63 mg unit dose by aerosol Levalbuterol MDI 2 puffs by inhalation with spacer    [] Ipratropium Bromide 0.02% unit dose by aerosol Ipratropium Bromide MDI 2 puffs by inhalation with spacer    [] Duoneb (Ipratropium + Albuterol) unit dose by aerosol Ipratropium MDI + Albuterol MDI 2 puffs by inhalation w/spacer   MDI to Aerosol [] Albuterol Sulfate MDI Albuterol Sulfate 0.083% unit dose by aerosol    [] Levalbuterol MDI 2 puffs by inhalation Levalbuterol 1.25 mg unit dose by aerosol    [] Ipratropium Bromide MDI by inhalation Ipratropium Bromide 0.02% unit dose by aerosol    [] Combivent (Ipratropium + Albuterol) MDI by inhalation Duoneb (Ipratropium + Albuterol) unit dose by aerosol   Treatment Assessment [Frequency/Schedule]:  Change frequency to: _no change________________________________________________per Protocol, P&T, MEC      Points 0 1 2 3 4   Pulmonary Status  Non-Smoker  []   Smoking history   < 20 pack years  []   Smoking history  ?  20 pack years  [x]   Pulmonary Disorder  (acute or chronic)  []   Severe or Chronic w/ Exacerbation  []     Surgical Status No [x]   Surgeries     General []   Surgery Lower []   Abdominal Thoracic or []   Upper Abdominal Thoracic with  PulmonaryDisorder  []     Chest X-ray Clear/Not  Ordered     [x]  Chronic Changes  Results Pending  []  Infiltrates, atelectasis, pleural effusion, or edema  []  Infiltrates in more than one lobe []  Infiltrate + Atelectasis, &/or pleural effusion  []    Respiratory Pattern Regular,  RR = 12-20 [x]  Increased,  RR = 21-25 []  BANG, irregular,  or RR = 26-30 []  Decreased FEV1  or RR = 31-35 []  Severe SOB, use  of accessory muscles, or RR ? 35  []    Mental Status Alert, oriented,  Cooperative [x]  Confused but Follows commands []  Lethargic or unable to follow commands []  Obtunded  []  Comatose  []    Breath Sounds Clear to  auscultation  [x]  Decreased unilaterally or  in bases only []  Decreased  bilaterally  []  Crackles or intermittent wheezes []  Wheezes []    Cough Strong, Spontan., & nonproductive [x]  Strong,  spontaneous, &  productive []  Weak,  Nonproductive []  Weak, productive or  with wheezes []  No spontaneous  cough or may require suctioning []    Level of Activity Ambulatory []  Ambulatory w/ Assist  [x]  Non-ambulatory []  Paraplegic []  Quadriplegic []    Total    Score:_____3__     Triage Score:______5__      Tri       Triage:     1. (>20) Freq: Q3    2. (16-20) Freq: Q4   3. (11-15) Freq: QID & Albuterol Q2 PRN    4. (6-10) Freq: TID & Albuterol Q2 PRN    5. (0-5) Freq Q4prn

## 2020-01-13 NOTE — PROGRESS NOTES
2000 Pt is alert and oriented x4. Nasal O2 at 2l. Daughter at bedside. Plan of care discussed with daughter. 2100 Pt on bedpan and voided yolis urine. U/A sent to lab. Buttocks is reddened with red rash- Zinc barrier ointment applied. Mepilex applied to dark red area on R buttocks. Mepilex to sacrum. 0200 Bed alarm sounding- pt trying to get out of bed. Pt confused at this time. Does not know where she is. Emotional support given. Taking po liquids without difficulty. 0600 pt having periods of confusion at times. Encouraged to stay on side due to reddened buttocks.

## 2020-01-13 NOTE — PROGRESS NOTES
Status: Within Functional Limits    Observation:       Cognition Status:  Cognition  Overall Cognitive Status: [facility]    Perception Status:  Perception  Overall Perceptual Status: [facility]    Sensation Status:  Sensation  Overall Sensation Status: [facility]    Vision and Hearing Status:  Vision  Vision: Impaired  Vision Exceptions: Wears glasses at all times  Hearing  Hearing: Within functional limits     ROM:   LUE AROM (degrees)  LUE AROM : Exceptions  LUE General AROM: limited proximally  Left Hand AROM (degrees)  Left Hand AROM: WFL  RUE AROM (degrees)  RUE AROM : Exceptions  RUE General AROM: limited proximally  Right Hand AROM (degrees)  Right Hand AROM: WFL    Strength:  LUE Strength  Gross LUE Strength: WFL  L Hand General: 3+/5  LUE Strength Comment: gross assessment-within limited range  RUE Strength  Gross RUE Strength: WFL  R Hand General: 3+/5  RUE Strength Comment: gross assessment-within limited range    Coordination, Tone, Quality of Movement: Tone RUE  RUE Tone: Normotonic  Tone LUE  LUE Tone: Normotonic  Coordination  Movements Are Fluid And Coordinated: Yes    Hand Dominance:  Hand Dominance  Hand Dominance: Right    ADL Status:  ADL  Feeding: Setup  Grooming: Moderate assistance  UE Bathing: Moderate assistance  LE Bathing: Maximum assistance  UE Dressing: Moderate assistance  LE Dressing: Maximum assistance  Toileting: Maximum assistance  Additional Comments: simulated ADL seated EOB.  levels as anticipated  Toilet Transfers  Toilet Transfer: Contact guard assistance  Toilet Transfers Comments: level anticipated based on t/f completed in room        Functional Mobility:  Functional Mobility  Functional - Mobility Device: Rolling Walker  Activity: Other(in room )  Assist Level: Contact guard assistance  Functional Mobility Comments: RW CGA   Transfers  Sit to stand: Contact guard assistance  Stand to sit: Contact guard assistance    Bed Mobility  Bed mobility  Supine to Sit: Stand by assistance  Sit to Supine: Minimal assistance    Seated and Standing Balance:  Balance  Sitting Balance: Supervision  Standing Balance: Contact guard assistance    Functional Endurance:  Activity Tolerance  Activity Tolerance: Patient Tolerated treatment well    D/C Recommendations:  OT D/C RECOMMENDATIONS  REQUIRES OT FOLLOW UP: Yes    Equipment Recommendations:       OT Education:        OT Follow Up:  OT D/C RECOMMENDATIONS  REQUIRES OT FOLLOW UP: Yes       Assessment/Discharge Disposition:  Assessment: Pt is an 80 yr old female presenting to [facility] with the above deficits. Pt would benefit from occupational therapy services to maximize safety and independence with ADL tasks, improve overall strength/endurance and balance for functional tasks.    Performance deficits / Impairments: Decreased functional mobility , Decreased safe awareness, Decreased balance, Decreased ADL status, Decreased ROM, Decreased endurance, Decreased high-level IADLs, Decreased strength  Prognosis: Good  Discharge Recommendations: Continue to assess pending progress  Decision Making: Medium Complexity  History: mod complex  Exam: 8 performance def  Assistance / Modification: maxA    Six Click Score   How much help for putting on and taking off regular lower body clothing?: A Lot  How much help for Bathing?: A Lot  How much help for Toileting?: A Lot  How much help for putting on and taking off regular upper body clothing?: A Little  How much help for taking care of personal grooming?: A Little  How much help for eating meals?: A Little  AM-[facility] Inpatient Daily Activity Raw Score: 15  AM-PAC Inpatient ADL T-Scale Score : 34.69  ADL Inpatient CMS 0-100% Score: 56.46    Plan:  Plan  Times per week: 1-3x/wk  Plan weeks: length of acute stay  Current Treatment Recommendations: Strengthening, Endurance Training, ROM, Balance Training, Patient/Caregiver Education & Training, Equipment Evaluation, Education, & procurement, Self-Care / ADL, Functional Mobility Training,

## 2020-01-13 NOTE — PROGRESS NOTES
Physical Therapy Med Surg Initial Assessment  Facility/Department: [facility]  Room: [place] [address]       NAME: Ayan Lawrence  : 1933 (80 y.o.)  MRN: [id number]  CODE STATUS: Full Code    Date of Service: 2020    Patient Diagnosis(es): Pneumonia [J18.9]   Chief Complaint   Patient presents with    Shortness of Breath    Emesis     Patient Active Problem List    Diagnosis Date Noted    Pneumonia 2020    Abrasion of buttock 2018        Past Medical History:   Diagnosis Date    Anticoagulated on Coumadin     Arthritis     Asthma     Atrial fibrillation ([facility] [address]     History of blood transfusion     Pneumonia      Past Surgical History:   Procedure Laterality Date    COLONOSCOPY      JOINT REPLACEMENT Bilateral     bilateral femur replacements       Chart Reviewed: Yes  Patient assessed for rehabilitation services?: Yes  Family / Caregiver Present: Yes(dtr)  General Comment  Comments: Pt awake in bed, agreeable to PT eval    Restrictions:  Restrictions/Precautions: Fall Risk     SUBJECTIVE: Subjective: Pt does not want to sit up in chair once finished.     Pain  Pre Treatment Pain Screening  Pain at present: 0    Post Treatment Pain Screening:   Pain Screening  Patient Currently in Pain: Denies  Pain Assessment  Pain Level: 0    Prior Level of Function:  Social/Functional History  Lives With: Alone  Type of Home: House  Home Layout: Two level, Able to Live on Main level with bedroom/bathroom  Home Access: Stairs to enter with rails  Entrance Stairs - Number of Steps: 8  Bathroom Shower/Tub: Walk-in shower  Bathroom Equipment: Grab bars in shower, Shower chair  Home Equipment: Cane, Rolling walker, Grab bars  ADL Assistance: Independent(family in home when completing bathing)  Homemaking Assistance: Independent  Homemaking Responsibilities: Yes  Ambulation Assistance: Independent  Transfer Assistance: Independent  Active : No  Occupation: Retired  IADL Comments: warms up food has hired help for cleaning, grocery shopping     OBJECTIVE:   Vision: Impaired  Vision Exceptions: Wears glasses at all times  Hearing: Within functional limits    Cognition:  Overall Orientation Status: Within Functional Limits  Follows Commands: Within Functional Limits         ROM:  RLE AROM: WFL  LLE AROM : WFL    Strength:  Strength RLE  Comment: grossly 4-/5  Strength LLE  Comment: grossly 4-/5    Neuro:  Balance  Sitting - Static: Good  Sitting - Dynamic: Good  Standing - Static: Fair;-  Standing - Dynamic: Fair;-(maintained dynamic balance with all mobility with UE support on [employer])        Sensation  Overall Sensation Status: WFL    Bed mobility  Supine to Sit: Stand by assistance  Sit to Supine: Minimal assistance    Transfers  Sit to Stand: Contact guard assistance  Stand to sit: Contact guard assistance  Bed to Chair: Contact guard assistance    Ambulation  Ambulation?: Yes  Ambulation 1  Surface: level tile  Device: Rolling Walker  Other Apparatus: O2  Assistance: Contact guard assistance  Quality of Gait: shuffling gait, slow jez, flexed posture  Distance: 40 ft  Comments: pt with R lateral lumbothoracic shift, fatigued with short distance              Activity Tolerance  Activity Tolerance: Patient Tolerated treatment well          PT Education  PT Education: Goals;PT Role;General Safety;Precautions    ASSESSMENT:   Body structures, Functions, Activity limitations: Decreased functional mobility ; Decreased balance;Decreased endurance;Decreased strength  Decision Making: Medium Complexity  History: high  Exam: medium  Clinical Presentation: medium    Prognosis: Good    DISCHARGE RECOMMENDATIONS:  Discharge Recommendations: Patient would benefit from continued therapy after discharge    Assessment: Pt with above deficits, tolerated minimal activity and short distances gait around room before becoming fatigued.  Pt will benefit from continued PT in order to progress mobility as tolerated, reduce falls risk, allow for safe return home.   REQUIRES PT FOLLOW UP: Yes      PLAN OF CARE:  Plan  Times per week: 3-6  Current Treatment Recommendations: Strengthening, Functional Mobility Training, Neuromuscular Re-education, Home Exercise Program, Equipment Evaluation, Education, & procurement, Transfer Training, Gait Training, Safety Education & Training, Balance Training, Endurance Training, Patient/Caregiver Education & Training  Safety Devices  Type of devices: Bed alarm in place, Call light within reach    Goals:  Long term goals  Long term goal 1: pt to be indep with bed mobility  Long term goal 2: indep with transfers  Long term goal 3: pt to ambulate >100 ft with [employer] supervision  Long term goal 4: pt to tolerate >10 min dynamic activity   Long term goal 5: indep with HEP    AMPAC (6 CLICK) BASIC MOBILITY  AM-PAC Inpatient Mobility Raw Score : 17     Therapy Time:   Individual   Time In 1438   Time Out 1515   [facility], 01/13/20 at 3:34 PM

## 2020-01-14 NOTE — PROGRESS NOTES
2008: Shift handoff complete. Pt awake in bed with family by bedside. No complaints or needs voiced at this time. Call light and bedside table within reach. Bed in lowest position with wheels locked. Will continue to monitor throughout this shift. 2106: Shift assessment complete. Pt is showing signs of confusion this evening. Pt is easily redirectable.

## 2020-01-14 NOTE — PROGRESS NOTES
Clinical Pharmacy Note    Warfarin consult follow-up    Recent Labs     01/14/20  0522   INR 4.1     Recent Labs     01/12/20  1030 01/13/20  0511 01/14/20  0522   HGB 15.8 13.4 12.8   HCT 48.9* 41.8 38.8    156 155       Significant drug:drug interactions:  Acetaminophen, azithromycin, Zosyn     Notes:  Date INR Warfarin Dose   01-12-20 3.5 HOLD     01/13/20 4.2  HOLD     01/14/20 4.1 HOLD                                      Patient's INR today is 4.1 (goal: 2-3). Home maintenance warfarin dose is as follows: 2.5mg once daily. Due to supra-therapeutic INR, hold warfarin dose today 1/14. Daily PT/INR until stable within therapeutic range. Mary Fallllor, PharmD Candidate 2020 1/14/2020 8:11 AM     Riley Mccann PharmD  1/14/2020 8:37 AM

## 2020-01-14 NOTE — PROGRESS NOTES
RN received call from Ultrasound, client to have ultrasound of gallbladder completed at 2000 on 1/14/20. Client instructed and educated to remain NPO until after ultrasound is completed.  Electronically signed by Nory Castrejon RN on 1/14/2020 at 4:46 PM

## 2020-01-14 NOTE — PROGRESS NOTES
Physician Progress Note    1/14/2020   2:26 PM    Name:  Salomón Larson  MRN:    [id number]      Day: 2     Admit Date: 1/12/2020 10:21 AM  PCP: No primary care provider on file. Code Status:  Full Code    Subjective:     Noted soft blood pressure 91/63, 79/48. Patient states she is completely asymptomatic. No lightheadedness, blurry vision, chest pain, thirst.  Otherwise no chest pain, dyspnea, cough, abdominal pain, change in bowels or urination.     Current Facility-Administered Medications   Medication Dose Route Frequency Provider Last Rate Last Dose    azithromycin (ZITHROMAX) 500 mg in D5W 250ml addavial  500 mg Intravenous Q24H Ashanti Mendoza,  mL/hr at 01/13/20 1856 500 mg at 01/13/20 1856    sodium chloride flush 0.9 % injection 10 mL  10 mL Intravenous 2 times per day [facility], DO   10 mL at 01/13/20 2100    sodium chloride flush 0.9 % injection 10 mL  10 mL Intravenous PRN [facility], DO        magnesium hydroxide (MILK OF MAGNESIA) 400 MG/5ML suspension 30 mL  30 mL Oral Daily PRN Oralia Smith, DO        ondansetron [facility]) injection 4 mg  4 mg Intravenous Q6H PRN [facility], DO        acetaminophen (TYLENOL) tablet 650 mg  650 mg Oral Q4H PRN [facility], DO        warfarin (COUMADIN) daily dosing (placeholder)   Other RX Placeholder [facility], DO        piperacillin-tazobactam (ZOSYN) 3.375 g in dextrose 5 % 50 mL IVPB extended infusion (mini-bag)  3.375 g Intravenous Q8H Shlomo Smith, DO 12.5 mL/hr at 01/14/20 1341 3.375 g at 01/14/20 1341    0.9 % sodium chloride bolus  1,000 mL Intravenous Once [facility], DO        albuterol (PROVENTIL) nebulizer solution 2.5 mg  2.5 mg Nebulization Q6H PRN [facility], DO        0.9 % sodium chloride bolus  1,000 mL Intravenous Once [facility], DO           Physical Examination:      Vitals:  BP 91/63   Pulse 105   Temp 97.9 °F (36.6 °C) (Oral)   Resp 18   Ht 5' 1\" (1.549 m)   Wt 163 lb (73.9 kg)   LMP  (LMP Unknown)   SpO2 100%   BMI 30.80 kg/m²   Temp (24hrs), Av.9 °F (36.6 °C), Min:97.9 °F (36.6 °C), Max:97.9 °F (36.6 °C)      General appearance: alert, cooperative and no distress  Mental Status: oriented to person, place and time and normal affect  Lungs: crackles at LLB  Heart: regular rate and rhythm, no murmur  Abdomen: soft, nontender, nondistended, bowel sounds present, no masses  Extremities: no edema, redness, tenderness in the calves  Skin: no gross lesions, rashes    Data:     Labs:  Recent Labs     20  0511 20  0522   WBC 15.3* 10.0   HGB 13.4 12.8    155     Recent Labs     20  0511 20  0522    139   K 4.6 4.1   * 107   CO2 22 24   BUN 16 17   CREATININE 0.64 0.72   GLUCOSE 124* 71     Recent Labs     20  1147   *   *   BILITOT 1.4*   ALKPHOS 153*       Assessment and Plan:        81yo F PMH paroxysmal atrial fibrillation, asthma presented with weakness, dyspnea, and myalgia. 1. Acute hypoxic respiratory failure and sepsis initially suspected to be due to LLL Pneumonia however infiltrate not obvious on CT scan yesterday. Suspected possible GI source however CT scan with no obvious etiology. This could have been viral syndrome. Patient now asymptomatic  - continue nebulizers, bronchopulmonary hygiene, and supportive respiratory care  - Transition to p.o. antibiotics for empiric 5 day course  - monitor LFTs; check RUQ US to better assess biliary tree     2. Suspected Sepsis: see above. Holding home anti-hypertensive agents    3. Paroxysmal Atrial Fibrillation with supratherapeutic INR: gradually reintroduce rate controlling agents as bp can tolerate    4. Abnormal LFTs: recheck.  RUQ US    Dehydration: resolved    Diet: DIET GENERAL;  Full Code    Dispo: home tomorrow if she remains stable      Electronically signed by Yasmin Morocho DO on 2020 at 2:26 PM

## 2020-01-14 NOTE — PROGRESS NOTES
balance, no UE support while reaching OOBOS     Exercises  Quad Sets: x15  Gluteal Sets: x15  Ankle Pumps: x15         ASSESSMENT   Assessment: Pt needing encouragement to participate in PT today. Further ambulation declined this date by pt d/t feeling tired. Increase in strength demonstrated through decreased assist level for bed mobility and transfers. Occ VC's needed for improved technique but pt displays good safety awareness overall. Discharge Recommendations:  Patient would benefit from continued therapy after discharge    Goals  Long term goals  Long term goal 1: pt to be indep with bed mobility  Long term goal 2: indep with transfers  Long term goal 3: pt to ambulate >100 ft with [employer] supervision  Long term goal 4: pt to tolerate >10 min dynamic activity   Long term goal 5: indep with HEP    PLAN    Plan  Plan Comment: Cont. POC  Safety Devices  Type of devices: All fall risk precautions in place; Bed alarm in place;Call light within reach; Left in bed     [facility] (6 CLICK) Brett [place] 28 Inpatient Mobility Raw Score : 17     Therapy Time   Individual   Time In 0845   Time Out 0859   Minutes 14      BM/Trsf: 8  NMR: 2  There ex: 4       Shreyas Heller PTA, 01/14/20 at 9:09 AM

## 2020-01-15 NOTE — PROGRESS NOTES
Clinical Pharmacy Note    Warfarin consult follow-up    Recent Labs     01/15/20  0500   INR 2.4     Recent Labs     01/13/20  0511 01/14/20  0522 01/15/20  0500   HGB 13.4 12.8 12.6   HCT 41.8 38.8 38.3    155 156       Significant drug:drug interactions:  Acetaminophen, Augmentin     Notes:  Date INR Warfarin Dose   01-12-20 3.5 HOLD     01/13/20 4.2  HOLD     01/14/20 4.1 HOLD    01/15/20 2.4 2.5mg                              Patient has an INR of 2.4 today (goal: 2-3). Home maintenance dose is as follows: 2.5mg once daily   Due to therapeutic INR, give warfarin 2.5mg PO today 1/15. Daily PT/INR until stable within therapeutic range. Mary Willis, PharmD Candidate 2020  1/15/2020 8:09 AM     Mita LaneD  1/15/2020 8:50 AM

## 2020-01-15 NOTE — DISCHARGE INSTR - COC
Continuity of Care Form    Patient Name: Aby Fernandez   :  1933  MRN:  [id number]    [facility] date:  2020  Discharge date:  1/15/2020    Code Status Order: Full Code   Advance Directives:   Advance Care Flowsheet Documentation     Date/Time Healthcare Directive Type of Healthcare Directive Copy in [address] Agent's Name Healthcare Agent's Phone Number    [phone]  Yes, patient has an advance directive for healthcare treatment  Durable power of  for health care;Living will;Health care treatment directive  No, copy requested from other (See comment)  Healthcare power of   Gigi Sow. [facility]   [phone]          Admitting Physician:  Alfredo Mas DO  PCP: No primary care provider on file. Discharging Nurse: [facility] Unit/Room#: X732/U841-45  Discharging Unit Phone Number: ***    Emergency Contact:   Extended Emergency Contact Information  Primary Emergency Contact: Kesha Adkins  Address: [address] [place] [id number] [address] Phone: [phone]  Mobile Phone: [phone]  Relation: Child    Past Surgical History:  Past Surgical History:   Procedure Laterality Date    COLONOSCOPY      JOINT REPLACEMENT Bilateral     bilateral femur replacements       Immunization History: There is no immunization history on file for this patient.     Active Problems:  Patient Active Problem List   Diagnosis Code    Abrasion of buttock S30.810A    Pneumonia J18.9       Isolation/Infection:   Isolation          No Isolation        Patient Infection Status     None to display          Nurse Assessment:  Last Vital Signs: /84   Pulse (!) 45   Temp 98.1 °F (36.7 °C) (Oral)   Resp 18   Ht 5' 1\" (1.549 m)   Wt 163 lb (73.9 kg)   LMP  (LMP Unknown)   SpO2 90%   BMI 30.80 kg/m²     Last documented pain score (0-10 scale): Pain Level: 0  Last Weight:   Wt Readings from Last 1 Encounters:   20

## 2020-01-15 NOTE — PROGRESS NOTES
0715 handoff complete. This RN assumed care of the pt. Pt resting in bed with no stated needs. 0830 during AM med pass pt could not swallow the pill. She immediately started coughing and chocking. Pt had to eventually spit it out. The pill was on the larger side. Dr. Yeimi Gonzalez made aware via perfect serve. 1206 pt is clear for D/C. IV has been removed. Pt calling her daughter to pick her up    [phone] D/C instructions given to pt and daughter. Pt has decided she would like assistance with transport home. Care coordination to set up transport for pt.

## 2020-01-15 NOTE — PLAN OF CARE
Problem: Falls - Risk of:  Goal: Will remain free from falls  Description  Will remain free from falls  1/15/2020 1042 by Ash Win RN  Outcome: Completed  1/14/2020 2335 by Meka Cruz RN  Outcome: Ongoing  Goal: Absence of physical injury  Description  Absence of physical injury  1/15/2020 1042 by Ash Win RN  Outcome: Completed  1/14/2020 2335 by Meka Cruz RN  Outcome: Ongoing     Problem: Risk for Impaired Skin Integrity  Goal: Tissue integrity - skin and mucous membranes  Description  Structural intactness and normal physiological function of skin and  mucous membranes.   1/15/2020 1042 by Ash Win RN  Outcome: Completed  1/14/2020 2335 by Meka Cruz RN  Outcome: Ongoing     Problem: Discharge Planning:  Goal: Discharged to appropriate level of care  Description  Discharged to appropriate level of care  1/15/2020 1042 by Ash Win RN  Outcome: Completed  1/14/2020 2335 by Meka Cruz RN  Outcome: Not Met This Shift  Goal: Participates in care planning  Description  Participates in care planning  1/15/2020 1042 by Ash Win RN  Outcome: Completed  1/14/2020 2335 by Meka Cruz RN  Outcome: Ongoing     Problem: Airway Clearance - Ineffective:  Goal: Clear lung sounds  Description  Clear lung sounds  1/15/2020 1042 by Ash Win RN  Outcome: Completed  1/14/2020 2335 by Meka Cruz RN  Outcome: Ongoing  Goal: Ability to maintain a clear airway will improve  Description  Ability to maintain a clear airway will improve  1/15/2020 1042 by Ash Win RN  Outcome: Completed  1/14/2020 2335 by Meka Cruz RN  Outcome: Ongoing     Problem: Fluid Volume - Deficit:  Goal: Achieves intake and output within specified parameters  Description  Achieves intake and output within specified parameters  1/15/2020 1042 by Ash Win RN  Outcome: Completed  1/14/2020 2335 by Meka Cruz RN  Outcome: Ongoing     Problem: Gas Exchange - Impaired:  Goal: Levels of oxygenation will improve  Description  Levels of oxygenation will improve  1/15/2020 1042 by Colton Fermin RN  Outcome: Completed  1/14/2020 2335 by Shashank Ambrocio RN  Outcome: Met This Shift     Problem: Hyperthermia:  Goal: Ability to maintain a body temperature in the normal range will improve  Description  Ability to maintain a body temperature in the normal range will improve  1/15/2020 1042 by Colton Fermin RN  Outcome: Completed  1/14/2020 2335 by Shashank Ambrocio RN  Outcome: Met This Shift     Problem: Mobility - Impaired:  Goal: Mobility will improve  Description  Therapy evaluation completed. Please see daily notes and/or progress notes for details related to planned treatment interventions, goals and functional abilities.      1/15/2020 1042 by Colton Fermin RN  Outcome: Completed  1/14/2020 2335 by Shashank Ambrocio RN  Outcome: Ongoing     Problem: IP BALANCE  Goal: LTG - patient will maintain standing balance to allow for completion of daily activities  1/15/2020 1042 by Colton Fermin RN  Outcome: Completed  1/14/2020 2335 by Shashank Ambrocio RN  Outcome: Met This Shift

## 2020-01-15 NOTE — DISCHARGE SUMMARY
[facility] Medicine Discharge Summary    Gutierrez See  :  1933  MRN:  [id number]    Admit date:  2020  Discharge date:  1/15/2020    Admitting Physician:  Edie Hogue DO  Primary Care Physician:  No primary care provider on file. Discharge Diagnoses: Active Problems:    Pneumonia  Resolved Problems:    * No resolved hospital problems. *    Chief Complaint   Patient presents with    Shortness of Breath    Emesis       Condition: improved   Activity: no restrictions  Diet: regular  Disposition: home with home care (PT/OT)  Functional Status: ambulatory    Significant Findings:      CXR:  1. Stable cardiomediastinal silhouette with underlying pulmonary edema. 2. Suspected left basilar infiltrate/pneumonia. 3. Severe bilateral glenohumeral osteoarthritis.  CT Abd/Pelvis:  No acute intra-abdominal process.       Colonic diverticulosis without diverticulitis.       Tiny gallstones and/or gallbladder sludge.        Punctate nonobstructing right renal calculus.       Age-indeterminate but chronic appearing severe compression deformity of the vertebral body of T9 and mild age-indeterminate but chronic appearing compression deformity of L3. Correlation for point tenderness is recommended.       Small left pleural effusion.  Abd US:  1. Combination of multiple gallstones and sludging. Lab Results   Component Value Date    ALT 68 (H) 01/15/2020    AST 64 (H) 01/15/2020    ALKPHOS 119 01/15/2020    BILITOT 0.6 01/15/2020       Hospital Course:   81yo F PMH paroxysmal atrial fibrillation, asthma presented with weakness, fever, dyspnea, and myalgia. She was found to have acute hypoxic respiratory failure secondary to sepsis with unclear source-this is most likely a viral syndrome but possibly a left lower lobe bacterial pneumonia though this was not obvious radiographically. She improved significantly with IV antibiotics and supportive care.   She

## 2020-01-15 NOTE — CARE COORDINATION
Spoke to patient. Plans to return home with [facility]. States has used [facility] in the past and wishes to use again. Declines need for list of agencies Current with Dr. Claritza Stanford. No additional needs identified. Referral called to Alyssa Sky @ [facility].

## 2020-02-18 NOTE — ED NOTES
Pt resting in bed   No distress noted  Waiting on labs     [facility]  02/17/20 5616

## 2020-02-18 NOTE — ED PROVIDER NOTES
CLOTRIMAZOLE-BETAMETHASONE (LOTRISONE) 1-0.05 % CREAM    Apply topically 2 times daily. FLUTICASONE-SALMETEROL (ADVAIR DISKUS) 250-50 MCG/DOSE AEPB    Inhale 1 puff into the lungs    METOPROLOL TARTRATE (LOPRESSOR) 50 MG TABLET    Take 75 mg by mouth daily    WARFARIN (COUMADIN) 5 MG TABLET    Take 5 mg by mouth       ALLERGIES     Carbamazepine and analogs and Codeine    FAMILY HISTORY       Family History   Problem Relation Age of Onset    High Blood Pressure Mother     Osteoporosis Mother     Other Brother           SOCIAL HISTORY       Social History     Socioeconomic History    Marital status:       Spouse name: Not on file    Number of children: Not on file    Years of education: Not on file    Highest education level: Not on file   Occupational History    Not on file   Social Needs    Financial resource strain: Not on file    Food insecurity:     Worry: Not on file     Inability: Not on file    Transportation needs:     Medical: Not on file     Non-medical: Not on file   Tobacco Use    Smoking status: Former Smoker     Packs/day: 0.25     Types: Cigarettes    Smokeless tobacco: Never Used    Tobacco comment: Quit smoking at 18yrs of age   Substance and Sexual Activity    Alcohol use: Not Currently     Comment: occasional    Drug use: Not Currently    Sexual activity: Not Currently   Lifestyle    Physical activity:     Days per week: Not on file     Minutes per session: Not on file    Stress: Not on file   Relationships    Social connections:     Talks on phone: Not on file     Gets together: Not on file     Attends Quaker service: Not on file     Active member of club or organization: Not on file     Attends meetings of clubs or organizations: Not on file     Relationship status: Not on file    Intimate partner violence:     Fear of current or ex partner: Not on file     Emotionally abused: Not on file     Physically abused: Not on file     Forced sexual activity: Not on file

## 2020-11-22 PROBLEM — I48.91 ATRIAL FIBRILLATION WITH RAPID VENTRICULAR RESPONSE (HCC): Status: ACTIVE | Noted: 2020-01-01

## 2020-11-22 NOTE — PROGRESS NOTES
Clinical Pharmacy Note    Sivan Young is a 80 y.o. female for whom pharmacy has been asked to manage warfarin therapy. Reason for Admission: back pain    Consulting Physician: Woody  Warfarin dose prior to admission: 2 mg daily (9/22/20)  Warfarin Indication: afib  Target INR range: 2-3  Order for concomitant anticoagulant therapy: n/a    Outpatient warfarin provider: RANDALL    Past Medical History:   Diagnosis Date    Anticoagulated on Coumadin     Arthritis     Asthma     Atrial fibrillation ([facility] [address]     History of blood transfusion     Pneumonia        Recent Labs     11/22/20  0852   INR 3.3     Recent Labs     11/22/20  0230   HGB 14.1   HCT 42.5          Significant drug:drug interactions:  New warfarin drug-drug interactions: n/a  Discontinued drug-drug interactions: n/a  Other warfarin drug-drug interactions:  Zosyn, vancomycin, acetaminophen    Date INR Warfarin Dose   11/22/20 3.3 HOLD                                     Daily PT/INR. Thank you for the consult.

## 2020-11-22 NOTE — CONSULTS
Factors:         CT imaging of the abdomen and pelvis were obtained and formatted as 5 mm contiguous axial images from the domes of the diaphragm to the symphysis pubis.  Sagittal and coronal reconstructions were also obtained.         Oral contrast medium:  None. Intravenous contrast medium:  Isovue-370, 100 mL.         Comparison:  CT abdomen pelvis, January 13, 2020.         Findings:         Lungs:  Lung bases show emphysematous change with minimal to mild dependent subsegmental atelectatic change.         Liver:  Normal in size, shape, and decreased in attenuation with focal fatty sparing. 5 mm cystlike area dome, right hepatic lobe (series 2, image 16).      Bile Ducts:  Normal in caliber.         Gallbladder:  Distended, with high attenuation sludge, or punctate calculi in dependent portion of gallbladder. No para cholecystic fluid. No gallbladder wall thickening.         Pancreas:  Normal without masses, cysts, ductal dilatation or calcification.         Spleen:  Lower limits of normal in size, unchanged, without masses or calcifications.  No splenules.         Kidneys:  Cranial caudal dimensions, right and left kidneys, measures 7.6 cm, and 7.2 cm, respectively. No hydronephrosis, masses, or stones.         Adrenals:  Normal.         Small bowel:  Normal in caliber.         Appendix:  Normal.         Colon:  Copious stool, rectal vault, with minimal wall thickening, and perirectal fat stranding. Diverticular change, sigmoid colon.         Peritoneum:  No ascites, free air, or fluid collections.         Vessels:  Aorta normal in course and caliber.   Portal vein, splenic vein, superior mesenteric vein are patent.         Lymph nodes:           Retroperitoneal:  No enlarged retroperitoneal lymph nodes. Mesenteric:  No enlarged mesenteric lymph nodes. Pelvic: No enlarged pelvic lymph nodes.         Ureters: Normal in course and caliber.  No calcifications.         Bladder: No wall thickening.      Reproductive organs: No pelvic masses.         Abdominal Wall:  No hernia identified.     No diastasis of rectus musculature.       No edema or masses.         Bones:  No bone lesions. Compression fracture, T9, unchanged. 6 mm anterolisthesis L4 on L5. 6 mm anterolisthesis L5 on S1. Diffuse disc space narrowing L5-S1. Diffuse disc space narrowing T9-10, through L2-L3. Lumbar scoliosis convexity to left apex L1-L2. Right femoral intramedullary zaina. Left femoral intramedullary zaina with femoral neck tension.              Impression         Sigmoid diverticulosis.         Fecal impaction with possible rectal stercoral colitis.         Hepatic steatosis with focal fatty sparing.         Spleen, lower limits of normal in size, unchanged.         Small bilateral kidneys.         Cholelithiasis versus high density gallbladder sludge.         Severe degenerative change lumbar spine with scoliosis.         Remote compression fracture, T9.         Grade 1, L4, and grade one, L5, spondylolisthesis, unchanged.            ASSESSMENT:  Patient Active Problem List   Diagnosis    Abrasion of buttock    Pneumonia    Atrial fibrillation with rapid ventricular response ([address]         PLAN:    Decubital ulcer  Does not appear infected     Possible UTI Place patient on back

## 2020-11-22 NOTE — CARE COORDINATION
[facility] Case Management Initial Discharge Assessment    Met with Patient to discuss discharge plan. PCP: Marjorie Cruz                                Date of Last Visit: 10/12/20    If no PCP, list provided? N/A    Discharge Planning    Living Arrangements: independently at home    Who do you live with? Alone    Who helps you with your care:  self or dtr and has private hire 1 day /week that comes and helps    If lives at home:     Do you have any barriers navigating in your home? yes - PT states lately she is not getting around well and has 3 decubitus ulcers that also need care. Patient can perform ADL? No    Current Services (outpatient and in home) :  Private Hire Help/Aides ([address])    Dialysis: No    Is transportation available to get to your appointments? Yes  -Dtr drives her or the private hire person    DME Equipment:  yes - Rollator,cane,walker    Respiratory equipment: Continuous Oxygen  2 Liters     Respiratory provider:  yes - [address] [place] [address],[address]:  yes - [address] on [address] [place] [address] with Medication Assistance Program?  No      Patient agreeable to [facility] ? Declined    Patient agreeable to SNF/Rehab? Yes, [facility]    Other discharge needs identified? N/A    Freedom of choice list provided with basic dialogue that supports the patient's individualized plan of care/goals and shares the quality data associated with the providers. Yes    Does Patient Have a High-Risk for Readmission Diagnosis (CHF, PN, MI, COPD)? No    The plan for Transition of Care is related to the following treatment goals:Safe discharge    Initial Discharge Plan? (Note: please see concurrent daily documentation for any updates after initial note). Plans to go to [facility] rehab if she qualifies.     The Patient   was provided with choice of any post-acute providers for care and equipment and agrees with discharge plan  Yes  Met with patient and she states her afib is nothing new and she is on coumadin. She states she came in because she can't walk . She states she also has decubitus ulcers that need care . She wants to go to our rehab unit. Will discuss with attending and get PT/OT ordered.    Electronically signed by Kevin Gallegos on 11/22/2020 at 10:21 AM

## 2020-11-22 NOTE — H&P
Hospitalist Group   History and Physical      CHIEF COMPLAINT: Bilateral lower extremity weakness, back pain    History of Present Illness:  80 y.o. female with a history of A. fib on Coumadin, asthma, presents with bilateral lower extremity weakness and back pain. Patient mentioned that she has been having progressively worsening weakness in the lower extremities. She usually sleeps on a chair because her bedroom is upstairs and she has not been able to go upstairs. She has been doing that for 3 years. She has decubitus ulcer that sometimes gets infected. She said that she has been having more pain in there recently. She denied cough, shortness of breath, fever, chest pain, dizziness. She denied urinary symptoms but mentioned that she is incontinent and she usually wears depends. She uses Rollator to go around in her home but lately she has been too weak to even be able to do that. She has not been able to eat or drink much and mainly because she cannot take care of herself. Her daughter lives close by and she checks on her sometimes. Patient is open to be placed somewhere. REVIEW OF SYSTEMS:  no fevers, chills, cp, sob, n/v, ha, vision/hearing changes, wt changes, hot/cold flashes, other open skin lesions, diarrhea, constipation, dysuria/hematuria unless noted in HPI. Complete ROS performed with the patient and is otherwise negative. PMH:  Past Medical History:   Diagnosis Date    Anticoagulated on Coumadin     Arthritis     Asthma     Atrial fibrillation ([facility] [address]     History of blood transfusion     Pneumonia        Surgical History:  Past Surgical History:   Procedure Laterality Date    COLONOSCOPY      JOINT REPLACEMENT Bilateral     bilateral femur replacements       Medications Prior to Admission:    Prior to Admission medications    Medication Sig Start Date End Date Taking? Authorizing Provider   traMADol (ULTRAM) 50 MG tablet Take 50 mg by mouth every 8 hours as needed for Pain. Yes Historical Provider, MD   metoprolol tartrate (LOPRESSOR) 50 MG tablet Take 75 mg by mouth daily   Yes Historical Provider, MD   fluticasone-salmeterol (ADVAIR DISKUS) 250-50 MCG/DOSE AEPB Inhale 1 puff into the lungs 12/28/17  Yes Historical Provider, MD   warfarin (COUMADIN) 5 MG tablet Take 5 mg by mouth 7/27/17  Yes Historical Provider, MD   Cholecalciferol (VITAMIN D3) 2000 units CAPS Take by mouth   Yes Historical Provider, MD       Allergies:    Carbamazepine and analogs and Codeine    Social History:    reports that she has quit smoking. Her smoking use included cigarettes. She smoked 0.25 packs per day. She has never used smokeless tobacco. She reports previous alcohol use. She reports previous drug use.     Family History:       Problem Relation Age of Onset    High Blood Pressure Mother     Osteoporosis Mother     Other Brother        PHYSICAL EXAM:  Vitals:  BP 96/71   Pulse 120   Temp 98.1 °F (36.7 °C) (Oral)   Resp 18   Ht 5' 4\" (1.626 m)   Wt 135 lb (61.2 kg)   LMP  (LMP Unknown)   SpO2 94%   BMI 23.17 kg/m²   General Appearance: alert and oriented to person, place and time, well developed and well- nourished, in no acute distress  Skin: warm and dry, erythema and decubitus ulcers in the coccygeal region  Head: normocephalic and atraumatic  Eyes: pupils equal, round, and reactive to light, extraocular eye movements intact, conjunctivae normal  ENT: tympanic membrane, external ear and ear canal normal bilaterally, nose without deformity, nasal mucosa and turbinates normal without polyps  Neck: supple and non-tender without mass, no thyromegaly or thyroid nodules, no cervical lymphadenopathy  Pulmonary/Chest: clear to auscultation bilaterally- no wheezes   Cardiovascular: normal rate, regular rhythm, normal S1 and S2, no murmurs   Abdomen: soft, non-tender, non-distended, normal bowel sounds, no masses or organomegaly  Extremities: no cyanosis, clubbing or edema  Musculoskeletal: normal

## 2020-11-22 NOTE — ED PROVIDER NOTES
[address] [facility] ED  eMERGENCY dEPARTMENT eNCOUnter      Pt Name: Mylene Light  MRN: [id number]  [place] 7/13/1933  Date of evaluation: 11/22/2020  Provider: Gagandeep Schwartz Dr       Chief Complaint   Patient presents with    Back Pain     week long; shooting up and down left leg    Tailbone Pain     decubitus         HISTORY OF PRESENT ILLNESS   (Location/Symptom, Timing/Onset,Context/Setting, Quality, Duration, Modifying Factors, Severity)  Note limiting factors. Mylene Light is a 80 y.o. female who presents to the emergency department patient presents with back pain tailbone pain left leg pain and weakness she states that she normally walks with a Rollator. She states she is been able to move in with her Rollator denies fever chills nausea vomiting she does have atrial fibrillation she does take metoprolol she takes it at night she did take today's dose. Symptoms are mild to moderate severity ambulation worsens symptoms nothing improves symptoms she is incontinent of urine and wears a pad for this. Denies loss of bowel control denies perineal anesthesia. HPI    NursingNotes were reviewed. REVIEW OF SYSTEMS    (2-9 systems for level 4, 10 or more for level 5)     Review of Systems   Constitutional: Negative for activity change, appetite change, fever and unexpected weight change. HENT: Negative for ear discharge, nosebleeds and voice change. Eyes: Negative for photophobia and discharge. Respiratory: Negative for apnea and cough. Cardiovascular: Negative for chest pain. Gastrointestinal: Negative for abdominal distention, anal bleeding and vomiting. Endocrine: Negative for cold intolerance, heat intolerance and polyphagia. Genitourinary: Negative for dysuria and genital sores. Musculoskeletal: Positive for back pain. Negative for joint swelling. Skin: Positive for color change and wound. Negative for pallor.    Allergic/Immunologic: Negative for immunocompromised state. Neurological: Negative for seizures and facial asymmetry. Hematological: Does not bruise/bleed easily. Psychiatric/Behavioral: Negative for behavioral problems, self-injury and sleep disturbance. All other systems reviewed and are negative. Except as noted above the remainder of the review of systems was reviewed and negative. PAST MEDICAL HISTORY     Past Medical History:   Diagnosis Date    Anticoagulated on Coumadin     Arthritis     Asthma     Atrial fibrillation ([address]     History of blood transfusion     Pneumonia          SURGICALHISTORY       Past Surgical History:   Procedure Laterality Date    COLONOSCOPY      JOINT REPLACEMENT Bilateral     bilateral femur replacements         CURRENT MEDICATIONS       Previous Medications    CHOLECALCIFEROL (VITAMIN D3) 2000 UNITS CAPS    Take by mouth    FLUTICASONE-SALMETEROL (ADVAIR DISKUS) 250-50 MCG/DOSE AEPB    Inhale 1 puff into the lungs    METOPROLOL TARTRATE (LOPRESSOR) 50 MG TABLET    Take 75 mg by mouth daily    TRAMADOL (ULTRAM) 50 MG TABLET    Take 50 mg by mouth every 8 hours as needed for Pain. WARFARIN (COUMADIN) 5 MG TABLET    Take 5 mg by mouth       ALLERGIES     Carbamazepine and analogs and Codeine    FAMILY HISTORY       Family History   Problem Relation Age of Onset    High Blood Pressure Mother     Osteoporosis Mother     Other Brother           SOCIAL HISTORY       Social History     Socioeconomic History    Marital status:       Spouse name: None    Number of children: None    Years of education: None    Highest education level: None   Occupational History    None   Social Needs    Financial resource strain: None    Food insecurity     Worry: None     Inability: None    Transportation needs     Medical: None     Non-medical: None   Tobacco Use    Smoking status: Former Smoker     Packs/day: 0.25     Types: Cigarettes    Smokeless tobacco: Never Used    Tobacco comment: Quit smoking at 18yrs of age   Substance and Sexual Activity    Alcohol use: Not Currently     Comment: occasional    Drug use: Not Currently    Sexual activity: Not Currently   Lifestyle    Physical activity     Days per week: None     Minutes per session: None    Stress: None   Relationships    Social connections     Talks on phone: None     Gets together: None     Attends Hinduism service: None     Active member of club or organization: None     Attends meetings of clubs or organizations: None     Relationship status: None    Intimate partner violence     Fear of current or ex partner: None     Emotionally abused: None     Physically abused: None     Forced sexual activity: None   Other Topics Concern    None   Social History Narrative    None       SCREENINGS      @FLOW([id number])@      PHYSICAL EXAM    (up to 7 for level 4, 8 or more for level 5)     ED Triage Vitals [11/22/20 0158]   BP Temp Temp Source Pulse Resp SpO2 Height Weight   89/65 98.1 °F (36.7 °C) Oral 102 16 95 % 5' 4\" (1.626 m) 135 lb (61.2 kg)       Physical Exam  Vitals signs and nursing note reviewed. Constitutional:       General: She is not in acute distress. Appearance: She is well-developed. HENT:      Head: Normocephalic and atraumatic. Right Ear: External ear normal.      Left Ear: External ear normal.      Nose: Nose normal.      Mouth/Throat:      Mouth: Mucous membranes are moist.   Eyes:      General:         Right eye: No discharge. Left eye: No discharge. Pupils: Pupils are equal, round, and reactive to light. Neck:      Musculoskeletal: Normal range of motion and neck supple. Cardiovascular:      Rate and Rhythm: Normal rate and regular rhythm. Heart sounds: Normal heart sounds. Pulmonary:      Effort: Pulmonary effort is normal. No respiratory distress. Breath sounds: Normal breath sounds. No stridor. Abdominal:      General: Bowel sounds are normal. There is no distension. Palpations: Abdomen is soft. Tenderness: There is abdominal tenderness. Musculoskeletal: Normal range of motion. Skin:     General: Skin is warm. Findings: No erythema. Neurological:      Mental Status: She is alert and oriented to person, place, and time. Motor: Weakness present. Comments: 4/5 strength lower rt  3/5 str lower ext. Psychiatric:         Mood and Affect: Mood normal.         DIAGNOSTIC RESULTS     EKG: All EKG's are interpreted by the Emergency Department Physician who either signs or Co-signsthis chart in the absence of a cardiologist.    EKG atrial fibrillation rate 107. QRS 74 ms QT T3 104 ms. RADIOLOGY:   Non-plain filmimages such as CT, Ultrasound and MRI are read by the radiologist. Plain radiographic images are visualized and preliminarily interpreted by the emergency physician with the below findings:    See preliminary CT report.     Interpretation per the Radiologist below, if available at the time ofthis note:    CT ABDOMEN PELVIS W IV CONTRAST Additional Contrast? None    (Results Pending)         ED BEDSIDE ULTRASOUND:   Performed by ED Physician - none    LABS:  Labs Reviewed   COMPREHENSIVE METABOLIC PANEL - Abnormal; Notable for the following components:       Result Value    Anion Gap 7 (*)     Glucose 101 (*)     Alb 3.1 (*)     Total Bilirubin 1.0 (*)     All other components within normal limits   URINE RT REFLEX TO CULTURE - Abnormal; Notable for the following components:    Clarity, UA CLOUDY (*)     Blood, Urine TRACE (*)     Nitrite, Urine POSITIVE (*)     Leukocyte Esterase, Urine MODERATE (*)     All other components within normal limits   MICROSCOPIC URINALYSIS - Abnormal; Notable for the following components:    RBC, UA 3-5 (*)     Bacteria, UA MANY (*)     WBC, UA 6-9 (*)     All other components within normal limits   POCT CREATININE - URINE - Normal   CULTURE, BLOOD 1   CULTURE, BLOOD 2   CBC WITH AUTO DIFFERENTIAL   MAGNESIUM LACTIC ACID, PLASMA   POCT VENOUS       All other labs were within normal range or not returned as of this dictation. EMERGENCY DEPARTMENT COURSE and DIFFERENTIAL DIAGNOSIS/MDM:   Vitals:    Vitals:    11/22/20 0300 11/22/20 0330 11/22/20 0400 11/22/20 0415   BP: 102/78 101/76 86/66 96/73   Pulse: 120 114 110 124   Resp: 17 20 23   Temp:       TempSrc:       SpO2: 95% 92% 94% 94%   Weight:       Height:                MDM  Number of Diagnoses or Management Options  Diagnosis management comments: Pt unable to stand. With nursing present. Left leg appears unable to bear any wieght. Pt lives at home states she walks with a Rollator since Tuesday has been unable to do so. Discussed with Dr. Leydi Seay including CT of abdomen and pelvis. Will admit antibiotics for UTI and skin structure infection weakness. Amount and/or Complexity of Data Reviewed  Clinical lab tests: reviewed and ordered  Tests in the radiology section of CPT®: ordered and reviewed        CRITICAL CARE TIME          CONSULTS:  None    PROCEDURES:  Unless otherwise noted below, none     Procedures    FINAL IMPRESSION      1. Atrial fibrillation, unspecified type ([facility] [address]    2. Urinary tract infection without hematuria, site unspecified    3. Left lower quadrant abdominal pain    4. Pressure injury of skin, unspecified injury stage, unspecified location    5. General weakness          DISPOSITION/PLAN   DISPOSITION Admitted 11/22/2020 05:38:05 AM      PATIENT REFERRED TO:  No follow-up provider specified.     DISCHARGE MEDICATIONS:  New Prescriptions    No medications on file          (Please note that portions of this note were completed with a voice recognition program.  Efforts were made to edit the dictations but occasionally words are mis-transcribed.)    Ameya Avitia PA-C (electronically signed)  Attending Emergency Physician       Ameya Avitia PA-C  11/22/20 4840

## 2020-11-22 NOTE — CONSULTS
[facility] Cardiology Consult Note        Date of Consult:   2020    Patient:    Carla Mathew    :    1933  CSN:    [id number]    Consulting Cardiologist: Papo Henry MD     Primary Cardiologist: Yanelis Strong MD [facility]    Requesting Physician:  Elpidio Hashimoto, MD      Reason for Consult:  Afib w RVR. Assessment:    1. Bilateral lower extremity weakness, progressive  2. Lower back pain  3. Urinary tract infection  4. Permanent atrial fibrillation, with RVR currently. 5. Long-term anticoagulation, current Coumadin  6. Negative Myoview perfusion stress test 10/2019,  7. Normal LV systolic function by echocardiogram 10/2019, ejection fraction 66%. 8. Aortic valve stenosis, mild, V-max 1.8 m/s.  9. Pulmonary fibrosis   10. Asthma  11. Mild pulmonary hypertension RVSP 43 last.  12. Past smoker  13. Hypothyroidism  14. GERD  15. Depression  16. Overweight  17. Degenerative joint disease, lumbar spine stenosis with neuropathy. 18. Otherwise as prior documentation. Plan:    1. Cardiac Supportive Care  2. Conservative cardiovascular care with rate control for atrial fibrillation. 3. Continue Coumadin unless procedures are warranted, hospital pharmacy to manage. 4. Continue prior home cardiovascular medications with appropriate adjustments for heart rate and blood pressure as needed. 5. Repeat echocardiogram.  6. Serial telemetry. 7. Serial laboratories including troponins. 8. Primary care to treat underlying urinary tract infection  9. Further Recommendations to follow  10.  See Orders    ++++++++++++++++++++++++++++++++++    HISTORY OF PRESENT ILLNESS:      Carla Mathew is a pleasant 80 y.o. female with with the above-noted past cardiac and medical history including permanent atrial fibrillation on chronic Coumadin anticoagulation, mild aortic valve stenosis, preserved left ventricular function, pulmonary fibrosis, asthma who now presents with bilateral lower extremity weakness and back pain. Patient has had progression of her symptomatology over the last several years but more recently over the last month. She feels too weak to get up and do her normal chores. She cannot climb steps now. She lives alone. She came in for evaluation through the emergency room. She was noted to have rapid atrial fibrillation. INR was 3.3. Initial troponin was negative. She was found to have urinary tract infection. She was admitted for further evaluation and possible placement. She is currently in isolation precautions. Cardiology was asked to see for further relation consultation. Patient Follows with Kapil Kramer MD [facility]. See his last note attached below. Patient History and Records, EMR reviewed. Patient Interviewed and examined. Fair historian. Denies CP, SOB, LH, Dizziness, TIA or CVA Symptoms. No Orthopnea, Edema or CHF symptoms. No Palpitations. No Syncope. No Fever, Chills or Cold symptoms. No GI or Bleeding complaints. Cardiac and general ROS otherwise negative. [address] otherwise negative other than noted. Past Medical History:   Diagnosis Date    Anticoagulated on Coumadin     Arthritis     Asthma     Atrial fibrillation ([facility] [address]     History of blood transfusion     Pneumonia        Past Surgical History:   Procedure Laterality Date    COLONOSCOPY      JOINT REPLACEMENT Bilateral     bilateral femur replacements       Prior to Admission medications    Medication Sig Start Date End Date Taking? Authorizing Provider   traMADol (ULTRAM) 50 MG tablet Take 50 mg by mouth every 8 hours as needed for Pain.    Yes Historical Provider, MD   metoprolol tartrate (LOPRESSOR) 50 MG tablet Take 75 mg by mouth daily   Yes Historical Provider, MD   fluticasone-salmeterol (ADVAIR DISKUS) 250-50 MCG/DOSE AEPB Inhale 1 puff into the lungs 12/28/17  Yes Historical Provider, MD   warfarin (COUMADIN) 5 MG tablet Take 5 mg by mouth 7/27/17  Yes Historical Provider, MD Scheduled Meds:   metoprolol  5 mg Intravenous Once    sodium chloride  500 mL Intravenous Once    vancomycin  1,000 mg Intravenous Once    potassium chloride  40 mEq Oral Once    magnesium sulfate  2 g Intravenous Once    sodium chloride  1,000 mL Intravenous Once    piperacillin-tazobactam  3.375 g Intravenous Q8H    sodium chloride flush  10 mL Intravenous 2 times per day    vancomycin (VANCOCIN) intermittent dosing (placeholder)   Other RX Placeholder    [START ON 11/23/2020] vancomycin  1,000 mg Intravenous Q24H    warfarin (COUMADIN) daily dosing (placeholder)   Other RX Placeholder     Continuous Infusions:   sodium chloride       PRN Meds:sodium chloride flush, acetaminophen **OR** acetaminophen, polyethylene glycol, promethazine **OR** ondansetron, metoprolol    Allergies   Allergen Reactions    Carbamazepine And Analogs     Codeine        Social History     Socioeconomic History    Marital status:       Spouse name: Not on file    Number of children: Not on file    Years of education: Not on file    Highest education level: Not on file   Occupational History    Not on file   Social Needs    Financial resource strain: Not on file    Food insecurity     Worry: Not on file     Inability: Not on file    Transportation needs     Medical: Not on file     Non-medical: Not on file   Tobacco Use    Smoking status: Former Smoker     Packs/day: 0.25     Types: Cigarettes    Smokeless tobacco: Never Used    Tobacco comment: Quit smoking at 18yrs of age   Substance and Sexual Activity    Alcohol use: Not Currently     Comment: occasional    Drug use: Not Currently    Sexual activity: Not Currently   Lifestyle    Physical activity     Days per week: Not on file     Minutes per session: Not on file    Stress: Not on file   Relationships    Social connections     Talks on phone: Not on file     Gets together: Not on file     Attends Sabianism service: Not on file     Active member of club or organization: Not on file     Attends meetings of clubs or organizations: Not on file     Relationship status: Not on file    Intimate partner violence     Fear of current or ex partner: Not on file     Emotionally abused: Not on file     Physically abused: Not on file     Forced sexual activity: Not on file   Other Topics Concern    Not on file   Social History Narrative    Not on file       Family History   Problem Relation Age of Onset    High Blood Pressure Mother     Osteoporosis Mother     Other Brother        Review Of Systems:    14 point ROS negative other than mentioned. Physical Exam:    CURRENT VITALS: /70   Pulse 106   Temp 98.1 °F (36.7 °C) (Oral)   Resp 18   Ht 5' 4\" (1.626 m)   Wt 130 lb 12.8 oz (59.3 kg)   LMP  (LMP Unknown)   SpO2 100%   BMI 22.45 kg/m²     Per Staff and Notes:  CONSTITUTIONAL:  awake, alert, cooperative, no apparent distress,   ENT:  Normocephalic, without obvious abnormality, atraumatic, sinuses nontender on palpation, external ears without lesions,  NECK:  Supple, symmetrical, trachea midline, no adenopathy, thyroid symmetric, not enlarged and no tenderness, skin normal, No bruits. LUNGS:  No increased work of breathing, good air exchange, clear to auscultation bilaterally, no crackles, no wheezing  CARDIOVASCULAR:  Normal apical impulse, regular rate and rhythm, normal S1 and S2,  2/6 Systolic murmur noted. ABDOMEN:  Obese, normal bowel sounds, soft, non-distended, non-tender, no masses palpated, no hepatosplenomegally  EXTREMETIES: No edema, Pulses Strong Thruout. No ulcers. NEUROLOGIC:  Awake, alert, oriented to name, place and time. Following all commands and moving all extremties.   SKIN:  no bruising or bleeding, normal skin color, texture, turgor and no rashes    Labs:  Recent Results (from the past 24 hour(s))   Comprehensive Metabolic Panel    Collection Time: 11/22/20  2:30 AM   Result Value Ref Range    Sodium 135 135 - 144 mEq/L    Potassium 3.7 3.4 - 4.9 mEq/L    Chloride 99 95 - 107 mEq/L    CO2 29 20 - 31 mEq/L    Anion Gap 7 (L) 9 - 15 mEq/L    Glucose 101 (H) 70 - 99 mg/dL    BUN 22 8 - 23 mg/dL    CREATININE 0.59 0.50 - 0.90 mg/dL    GFR Non-African American >60.0 >60    GFR  >60.0 >60    Calcium 9.2 8.5 - 9.9 mg/dL    Total Protein 6.4 6.3 - 8.0 g/dL    Alb 3.1 (L) 3.5 - 4.6 g/dL    Total Bilirubin 1.0 (H) 0.2 - 0.7 mg/dL    Alkaline Phosphatase 64 40 - 130 U/L    ALT 8 0 - 33 U/L    AST 29 0 - 35 U/L    Globulin 3.3 2.3 - 3.5 g/dL   CBC Auto Differential    Collection Time: 11/22/20  2:30 AM   Result Value Ref Range    WBC 8.3 4.8 - 10.8 K/uL    RBC 4.51 4.20 - 5.40 M/uL    Hemoglobin 14.1 12.0 - 16.0 g/dL    Hematocrit 42.5 37.0 - 47.0 %    MCV 94.3 82.0 - 100.0 fL    MCH 31.2 27.0 - 31.3 pg    MCHC 33.1 33.0 - 37.0 %    RDW 14.4 11.5 - 14.5 %    Platelets 806 920 - 944 K/uL    Neutrophils % 71.7 %    Lymphocytes % 16.9 %    Monocytes % 9.9 %    Eosinophils % 0.6 %    Basophils % 0.9 %    Neutrophils Absolute 6.0 1.4 - 6.5 K/uL    Lymphocytes Absolute 1.4 1.0 - 4.8 K/uL    Monocytes Absolute 0.8 0.2 - 0.8 K/uL    Eosinophils Absolute 0.1 0.0 - 0.7 K/uL    Basophils Absolute 0.1 0.0 - 0.2 K/uL   Magnesium    Collection Time: 11/22/20  2:30 AM   Result Value Ref Range    Magnesium 1.7 1.7 - 2.4 mg/dL   Urine Reflex to Culture    Collection Time: 11/22/20  2:30 AM    Specimen: Urine, clean catch   Result Value Ref Range    Color, UA Yellow Straw/Yellow    Clarity, UA CLOUDY (A) Clear    Glucose, Ur Negative Negative mg/dL    Bilirubin Urine Negative Negative    Ketones, Urine Negative Negative mg/dL    Specific Gravity, UA 1.020 1.005 - 1.030    Blood, Urine TRACE (A) Negative    pH, UA 5.5 5.0 - 9.0    Protein, UA Negative Negative mg/dL    Urobilinogen, Urine 1.0 <2.0 E.U./dL    Nitrite, Urine POSITIVE (A) Negative    Leukocyte Esterase, Urine MODERATE (A) Negative    Urine Reflex to Culture Not Indicated There is no elevation jugular venous distention. She has no abdominal pain. There is no edema    Over 50% of patient evaluation involved discussion of diagnosis, pathophysiology, treatment and care coordination. Chief Complaint  6 month follow up   Chief Complaint Free Text:   An interactive audio and video telecommunication system which permitted real time communication was used with the patient consent to complete today's visit. Active Problems   1. Anxiety disorder (300.00) (F41.9)   2. Aortic stenosis (424.1) (I35.0)   · 10/11/2019 echo. LVEF 65%. Moderate LVH. Biatrial enlargement. Moderate aortic      stenosis peak/mean gradients 13/9 mmHg. Peak velocity 1.8 m/s. RVSP 43 mmHg   MAY 2017 echocardiogram: LVEF 66%. Biatrial enlargement. Mild RV dilatation. Aortic      valve area 1.6 cm². Peak/mean gradient 14/7 mmHg. RVSP 31 mm   3. Asthma (493.90) (J45.909)   4. Chronic GERD (530.81) (K21.9)   5. Depression (311) (F32.9)   6. Dizziness (780.4) (R42)   7. High risk medication use (V58.69) (Z79.899)   8. Hyperlipidemia (272.4) (E78.5)   9. Hypothyroidism (244.9) (E03.9)   10. Normal cardiac stress test   · 10/11/2019. Lexiscan perfusion. Normal perfusion. 66% LVEF   11. Overweight (278.02) (E66.3)   12. Permanent atrial fibrillation (427.31) (I48.21)   Initial diagnosis prior to 2014 paroxysmal at that time. 2017 was far more frequent and      antiarrhythmic therapy was considered but rate controlled preferred by patient. As of      September 2 019: Likely permanent. 13. Pulmonary fibrosis (515) (J84.10)   14. Right carotid bruit (785.9) (R09.89)   · 10/11/2019. Carotid duplex. Less than 50% bilateral stenosis with mild diffuse plaque   15. Former smoker (D37.40) (W89.695)    Family History   1. Family history of hypertension (V17.49) (Z82.49) : Mother   2.  Family history of malignant neoplasm (V16.9) (Z80.9) : Brother    Social History   · Daily caffeine consumption, 4-5 servings a day   · Former smoker (V15.82) (B24.961)   · No illicit drug use   · Occasional alcohol consumption (V49.89) (Z78.9)    Surgical History  Denies any surgeries or colonoscopy since last appt. ANoble, LPN     Current Meds   1. Metoprolol Succinate ER 25 MG Oral Tablet Extended Release 24 Hour; one tablet at   bedtime in addition to 50mg=75mg; Therapy: 41ZGT0229 to (Evaluate:70Qgv6098)  Requested for: 62AER5221; Last   Rx:[id number] Ordered   2. Metoprolol Succinate ER 50 MG Oral Tablet Extended Release 24 Hour; 1 tablet at   bedtime in addition to 25mg=75mg; Therapy: 09VUW5103 to (Evaluate:47Uqj7708)  Requested for: 53Fwt5418; Last   Rx:[id number] Ordered   3. traMADol HCl - 50 MG Oral Tablet; TAKE 1 TABLET 3 TIMES DAILY AS NEEDED; Therapy: 48MYE6823 to Recorded   4. Vitamin D3 50 MCG (2000 UT) Oral Tablet; take 1 tablet daily; Therapy: (Debo Stamp) to Recorded   5. Warfarin Sodium 2 MG Oral Tablet; take 1 tabelt daily as directed. managed by pcp office; Therapy: (Recorded:64Cdr9310) to Recorded   6. Warfarin Sodium 2 MG Oral Tablet; TAKE 1 TABLET DAILY; Therapy: 51MNG5326 to Recorded   7. Claudine Riling 250-50 MCG/DOSE Inhalation Aerosol Powder Breath Activated; INHALE 1   PUFF TWICE DAILY; Therapy: 02Zaa6508 to Recorded    Meds reviewed with daughter with list on her phone, via phone call. ANoble, LPN     Allergies   1. codeine   Hallucinations;; Updated By: Donis Fung; 10/22/2019 1:25:31 PM   2. TEGretol TABS   also had abnormal blood levels; Rash; Recorded By: Donis Fung; 10/22/2019 1:25:32 PM    Immunizations  PCV --- Becky Muldoon: 01-Oct-2019   PPSV --- Becky Boron: 01-Jan-2011     Pt does not get flu vaccines. ANoble, LPN     Vitals  Daughter says mother does not check bp's. Advised to check if she has a monitor available prior to tomorrow appt. ANoble, LPN     Assessment   1. Permanent atrial fibrillation (427.31) (I48.21)   2. Aortic stenosis (424.1) (I35.0)   3.  Dizziness (780.4) (R42)    Plan   1. 6 month follow-up/call if interval problems. Evaluation and Treatment  Follow-up  Status:   Active  Requested for: 87HLP6417   2. Follow up per family physician. Evaluation and Treatment  Follow-up  Status: Active    Requested for: 35HEY9043   5. Access your medical record, request prescriptions, view laboratory and testing done in   our office, request appointments, view immunization records and message your provider   through our safe and secure patient portal. Ask a staff member how   to register or visit us at [email or web address] to get started today.;   Status:Complete;   Done: 08Oct2020   4. Continue same medications/treatment.; Status:Active; Requested for:08Oct2020;    5. Drink plenty of fluids.; Status:Active; Requested for:34Fad9294;    6. Eat a normal well-balanced diet.; Status:Active; Requested for:08Oct2020;    7. Please bring all medicines, vitamins, and herbal supplements with you when you come   to the office.; Status:Active; Requested for:08Oct2020;    8. Please bring any lab results from other providers/physicians to your next appointment.;   Status:Active; Requested for:08Oct2020;    9. Prescriptions will not be filled unless you are compliant with your follow up appointments   or have a follow up appointments scheduled as per the instruction of your physician. Refills for medications should be requested at the time of your office visit.; Status:Active; Requested for:08Oct2020;    10. Thank you for being a patient at [facility]. Our goal is to    provide high quality medical care. Following today's visit, please check your email for an    invitation to complete our patient satisfaction survey. By sharing your feedback, you will    help us continue our mission to provide excellent medical care. Your participation in the    survey is voluntary and completely confidential. We appreciate your thoughts regarding    today's visit.; Status:Active;  Requested for:02Flk1939;    11. Tobacco use Hx Reported; Status:Complete;   Done: 92ZLS1195    Discussion/Summary  As discussed today, please be sure to drink more fluids and eat regularly.      Signatures  Electronically signed by : Neta Apley, LPN; Oct  7 0284  4:52KT EST                        Electronically signed by : Batool Waller LPN; Oct  8 2446  6:98ZS EST                        Electronically signed by : Kurtis Fan M.D.; Oct  8 2020  6:08PM EST

## 2020-11-22 NOTE — ED TRIAGE NOTES
Patient brought in b [facility] from home for back pain shooting down left leg. Patient alert and oriented x 4. Patient takes tramadol ever 8 hours as needed for pain. Patient rates pain on verbal scale of 10/10. Patient states Abhijeet Mejia has two decubitus ulcers on buttocks\" Patient denies an injur.

## 2020-11-22 NOTE — ED NOTES
Jonathan JACOBS at the bedside at this time. Assessment completed.       Grace Bentley RN  11/22/20 1249

## 2020-11-22 NOTE — PROGRESS NOTES
Patient seen and examined. Telemetry strips reviewed, atrial flutter, rate controlled. Beta-blocker ordered. Echocardiogram tomorrow. Rule out Covid. Given decubitus wound, wound nurse evaluation requested.

## 2020-11-23 PROBLEM — E78.5 HYPERLIPIDEMIA: Status: ACTIVE | Noted: 2020-01-01

## 2020-11-23 PROBLEM — F32.9 MAJOR DEPRESSIVE DISORDER: Status: ACTIVE | Noted: 2020-01-01

## 2020-11-23 PROBLEM — R26.9 GAIT ABNORMALITY: Status: ACTIVE | Noted: 2020-01-01

## 2020-11-23 PROBLEM — K21.9 GERD (GASTROESOPHAGEAL REFLUX DISEASE): Status: ACTIVE | Noted: 2020-01-01

## 2020-11-23 PROBLEM — I35.0 AORTIC STENOSIS: Status: ACTIVE | Noted: 2020-01-01

## 2020-11-23 PROBLEM — F41.1 GAD (GENERALIZED ANXIETY DISORDER): Status: ACTIVE | Noted: 2020-01-01

## 2020-11-23 PROBLEM — M81.0 OSTEOPOROSIS: Status: ACTIVE | Noted: 2020-01-01

## 2020-11-23 PROBLEM — R09.02 HYPOXIA: Status: ACTIVE | Noted: 2019-09-19

## 2020-11-23 PROBLEM — E55.9 VITAMIN D DEFICIENCY: Status: ACTIVE | Noted: 2020-01-01

## 2020-11-23 PROBLEM — F33.42 RECURRENT MAJOR DEPRESSIVE DISORDER, IN FULL REMISSION (HCC): Status: ACTIVE | Noted: 2018-07-03

## 2020-11-23 PROBLEM — M15.9 OSTEOARTHRITIS, GENERALIZED: Status: ACTIVE | Noted: 2019-10-01

## 2020-11-23 NOTE — PROGRESS NOTES
Comprehensive Nutrition Assessment    Type and Reason for Visit:  Initial, Positive Nutrition Screen, Wound    Nutrition Recommendations/Plan: Continue Current Diet, Start Oral Nutrition Supplement    Nutrition Assessment:  Pt presents with decreased intake pta/unintended weight loss and increased nutrient needs due to presence of wounds. To provide high calorie supplement tid, nutritious pudding x1/day,  and frozen supplement bid. To monitor intake of meals and acceptance to supplements. Malnutrition Assessment:  Malnutrition Status:  Insufficient data    Context:  Chronic Illness       Estimated Daily Nutrient Needs:  Energy (kcal):  0498-3483 (kg x 28-30); Weight Used for Energy Requirements:  Admission     Protein (g):  77-88 (kg x 1.3-1.5); Weight Used for Protein Requirements:  Admission        Fluid (ml/day):  ~1500 (kg x 25); Method Used for Fluid Requirements:  ml/Kg      Nutrition Related Findings:  Pt with bilateral buttocks open wounds and noted decreased intake pta. Weight loss noted per stated prior weights. +1 BLE edema noted.       Wounds:  Pressure Injury(bilateral buttocks)       Current Nutrition Therapies:    DIET GENERAL; Safety Tray; Safety Tray (Disposables)    Anthropometric Measures:  · Height: 5' 4\" (162.6 cm)  · Admission Body Weight: 130 lb (59 kg)(bedscale)    · Usual Body Weight: 155 lb (70.3 kg)(2/17 stated); 163lb (1/12 stated))     · Ideal Body Weight: 120 lbs; % Ideal Body Weight   >100%  · BMI:  22.45  · BMI Categories: Normal Weight (BMI 22.0 to 24.9) age over 72       Nutrition Diagnosis:   · Increased nutrient needs related to increase demand for energy/nutrients as evidenced by wounds  · Unintended weight loss related to inadequate protein-energy intake as evidenced by weight loss    Nutrition Interventions:   Food and/or Nutrient Delivery:  Continue Current Diet, Start Oral Nutrition Supplement  Nutrition Education/Counseling:  Education not indicated   Coordination of Nutrition Care:  Continue to monitor while inpatient    Goals:  Intake >75% of meals/supplements. Stable weight. Improved wound status.        Nutrition Monitoring and Evaluation:   Food/Nutrient Intake Outcomes:  Food and Nutrient Intake, Supplement Intake  Physical Signs/Symptoms Outcomes:  Skin, Weight, Biochemical Data     Electronically signed by Rigo Cuevas RD, LD on 11/23/20 at 3:51 PM EST

## 2020-11-23 NOTE — PLAN OF CARE
Nutrition Problem #1: Increased nutrient needs  Intervention: Food and/or Nutrient Delivery: Continue Current Diet, Start Oral Nutrition Supplement  Nutritional Goals: Intake >75% of meals/supplements. Stable weight. Improved wound status.

## 2020-11-23 NOTE — PROGRESS NOTES
[facility] Occupational Therapy      Date: 2020  Patient Name: Manju Rodriguez        MRN: [id number]  Account: [de-identified]   : 1933  (80 y.o.)  Room: Health system/[place] [address]    Chart reviewed, attempted OT at [phone] for eval. Patient not seen 2° to:    Pt. declined, stating: \"I'm just in too much pain. \" Educated pt. on importance of attempting mobility for pain reduction, endurance training and d/c planning. Pt. refuses stating \"if you come back tomorrow I'll do it but I'm just not doing it right now. \" Offered minimal mobility including just to EOB and attempting stand if able but pt. refuses. Spoke to [employer], RN aware. Will attempt again when able.     Electronically signed by JAMES Quiroz on 2020 at 3:45 PM

## 2020-11-23 NOTE — FLOWSHEET NOTE
Covid test done as ordered. Pt given seferino care and new mepelex applied to open areas on buttocks and sacrum as the prior one was soiled. New purewick applied and depends. Pt repositioned in bed. Call bell in reach.

## 2020-11-23 NOTE — PROGRESS NOTES
mild aortic valve stenosis, preserved left ventricular function, pulmonary fibrosis, asthma who now presents with bilateral lower extremity weakness and back pain. Patient has had progression of her symptomatology over the last several years but more recently over the last month. She feels too weak to get up and do her normal chores. She cannot climb steps now. She lives alone. She came in for evaluation through the emergency room. She was noted to have rapid atrial fibrillation. INR was 3.3. Initial troponin was negative. She was found to have urinary tract infection. She was admitted for further evaluation and possible placement. She is currently in isolation precautions. Cardiology was asked to see for further relation consultation. Patient Follows with Renan Ashton MD [facility]. See his last note attached below. Patient History and Records, EMR reviewed. Patient Interviewed and examined. Fair historian. Denies CP, SOB, LH, Dizziness, TIA or CVA Symptoms. No Orthopnea, Edema or CHF symptoms. No Palpitations. No Syncope. No Fever, Chills or Cold symptoms. No GI or Bleeding complaints. Cardiac and general ROS otherwise negative. [address] otherwise negative other than noted. Past Medical History:   Diagnosis Date    Anticoagulated on Coumadin     Arthritis     Asthma     Atrial fibrillation ([address]     History of blood transfusion     Pneumonia        Past Surgical History:   Procedure Laterality Date    COLONOSCOPY      JOINT REPLACEMENT Bilateral     bilateral femur replacements       Prior to Admission medications    Medication Sig Start Date End Date Taking? Authorizing Provider   traMADol (ULTRAM) 50 MG tablet Take 50 mg by mouth every 8 hours as needed for Pain.    Yes Historical Provider, MD   metoprolol tartrate (LOPRESSOR) 50 MG tablet Take 75 mg by mouth daily   Yes Historical Provider, MD   fluticasone-salmeterol (ADVAIR DISKUS) 250-50 MCG/DOSE AEPB Inhale 1 puff into the lungs 12/28/17  Yes Historical Provider, MD   warfarin (COUMADIN) 5 MG tablet Take 5 mg by mouth 7/27/17  Yes Historical Provider, MD       Scheduled Meds:   sodium chloride flush  10 mL Intravenous 2 times per day    vancomycin (VANCOCIN) intermittent dosing (placeholder)   Other RX Placeholder    warfarin (COUMADIN) daily dosing (placeholder)   Other RX Placeholder    sulfamethoxazole-trimethoprim  1 tablet Oral 2 times per day    metoprolol tartrate  25 mg Oral 4x Daily     Continuous Infusions:    PRN Meds:sodium chloride flush, acetaminophen **OR** acetaminophen, polyethylene glycol, promethazine **OR** ondansetron, metoprolol    Allergies   Allergen Reactions    Carbamazepine And Analogs     Codeine        Social History     Socioeconomic History    Marital status:       Spouse name: Not on file    Number of children: Not on file    Years of education: Not on file    Highest education level: Not on file   Occupational History    Not on file   Social Needs    Financial resource strain: Not on file    Food insecurity     Worry: Not on file     Inability: Not on file    Transportation needs     Medical: Not on file     Non-medical: Not on file   Tobacco Use    Smoking status: Former Smoker     Packs/day: 0.25     Types: Cigarettes    Smokeless tobacco: Never Used    Tobacco comment: Quit smoking at 18yrs of age   Substance and Sexual Activity    Alcohol use: Not Currently     Comment: occasional    Drug use: Not Currently    Sexual activity: Not Currently   Lifestyle    Physical activity     Days per week: Not on file     Minutes per session: Not on file    Stress: Not on file   Relationships    Social connections     Talks on phone: Not on file     Gets together: Not on file     Attends Rastafari service: Not on file     Active member of club or organization: Not on file     Attends meetings of clubs or organizations: Not on file     Relationship status: Not on file  Intimate partner violence     Fear of current or ex partner: Not on file     Emotionally abused: Not on file     Physically abused: Not on file     Forced sexual activity: Not on file   Other Topics Concern    Not on file   Social History Narrative    Not on file       Family History   Problem Relation Age of Onset    High Blood Pressure Mother     Osteoporosis Mother     Other Brother        Review Of Systems:    14 point ROS negative other than mentioned. Physical Exam:    CURRENT VITALS: /71   Pulse 97   Temp 98.2 °F (36.8 °C) (Oral)   Resp 20   Ht 5' 4\" (1.626 m)   Wt 130 lb 12.8 oz (59.3 kg)   LMP  (LMP Unknown)   SpO2 99%   BMI 22.45 kg/m²     Per Staff and Notes:  CONSTITUTIONAL:  awake, alert, cooperative, no apparent distress,   ENT:  Normocephalic, without obvious abnormality, atraumatic, sinuses nontender on palpation, external ears without lesions,  NECK:  Supple, symmetrical, trachea midline, no adenopathy, thyroid symmetric, not enlarged and no tenderness, skin normal, No bruits. LUNGS:  No increased work of breathing, good air exchange, clear to auscultation bilaterally, no crackles, no wheezing  CARDIOVASCULAR:  Normal apical impulse, regular rate and rhythm, normal S1 and S2,  2/6 Systolic murmur noted. ABDOMEN:  Obese, normal bowel sounds, soft, non-distended, non-tender, no masses palpated, no hepatosplenomegally  EXTREMETIES: No edema, Pulses Strong Thruout. No ulcers. NEUROLOGIC:  Awake, alert, oriented to name, place and time. Following all commands and moving all extremties.   SKIN:  no bruising or bleeding, normal skin color, texture, turgor and no rashes    Labs:  Recent Results (from the past 24 hour(s))   COVID-19    Collection Time: 11/22/20  2:20 PM   Result Value Ref Range    SARS-CoV-2, NAAT Not Detected Not Detected   Troponin    Collection Time: 11/22/20  2:22 PM   Result Value Ref Range    Troponin <0.010 0.000 - 0.010 ng/mL   EKG 12 Lead    Collection Time: 11/23/20  1:49 AM   Result Value Ref Range    Ventricular Rate 94 BPM    Atrial Rate 50 BPM    QRS Duration 78 ms    Q-T Interval 398 ms    QTc Calculation (Bazett) 497 ms    R Axis -18 degrees    T Axis -24 degrees   CBC Auto Differential    Collection Time: 11/23/20  6:24 AM   Result Value Ref Range    WBC 7.0 4.8 - 10.8 K/uL    RBC 4.36 4.20 - 5.40 M/uL    Hemoglobin 13.8 12.0 - 16.0 g/dL    Hematocrit 42.0 37.0 - 47.0 %    MCV 96.3 82.0 - 100.0 fL    MCH 31.6 (H) 27.0 - 31.3 pg    MCHC 32.8 (L) 33.0 - 37.0 %    RDW 15.1 (H) 11.5 - 14.5 %    Platelets 189 925 - 703 K/uL    Neutrophils % 69.9 %    Lymphocytes % 18.7 %    Monocytes % 9.7 %    Eosinophils % 1.2 %    Basophils % 0.5 %    Neutrophils Absolute 4.9 1.4 - 6.5 K/uL    Lymphocytes Absolute 1.3 1.0 - 4.8 K/uL    Monocytes Absolute 0.7 0.2 - 0.8 K/uL    Eosinophils Absolute 0.1 0.0 - 0.7 K/uL    Basophils Absolute 0.0 0.0 - 0.2 K/uL   PROTIME-INR    Collection Time: 11/23/20  6:24 AM   Result Value Ref Range    Protime 27.8 (H) 12.3 - 14.9 sec    INR 2.6    Magnesium    Collection Time: 11/23/20  6:24 AM   Result Value Ref Range    Magnesium 2.0 1.7 - 2.4 mg/dL   Comprehensive Metabolic Panel    Collection Time: 11/23/20  6:24 AM   Result Value Ref Range    Sodium 136 135 - 144 mEq/L    Potassium 4.3 3.4 - 4.9 mEq/L    Chloride 103 95 - 107 mEq/L    CO2 26 20 - 31 mEq/L    Anion Gap 7 (L) 9 - 15 mEq/L    Glucose 81 70 - 99 mg/dL    BUN 15 8 - 23 mg/dL    CREATININE 0.55 0.50 - 0.90 mg/dL    GFR Non-African American >60.0 >60    GFR  >60.0 >60    Calcium 8.4 (L) 8.5 - 9.9 mg/dL    Total Protein 6.1 (L) 6.3 - 8.0 g/dL    Alb 2.7 (L) 3.5 - 4.6 g/dL    Total Bilirubin 1.5 (H) 0.2 - 0.7 mg/dL    Alkaline Phosphatase 126 40 - 130 U/L    ALT 57 (H) 0 - 33 U/L     (H) 0 - 35 U/L    Globulin 3.4 2.3 - 3.5 g/dL   Sedimentation Rate    Collection Time: 11/23/20  6:24 AM   Result Value Ref Range    Sed Rate 11 0 - 30 mm   High sensitivity CRP    Collection Time: 11/23/20  6:24 AM   Result Value Ref Range    CRP High Sensitivity 40.8 (H) 0.0 - 5.0 mg/L       ECG:     Afib, rate 80s    TELEMETRY:  Afib. ECHO:    Pending        Mattie Severe, MD  [facility] Cardiologist      Electronically signed on 11/22/20 at 9:16 AM EST      -----    Dr Gucci Xavier Last Office Note:    Subjective  PCP: Yanelis   Subjective: Mrs. Brayan Ferris follows here for mild-moderate aortic stenosis and permanent atrial fibrillation. She is on chronic anticoagulation with Coumadin managed by at [facility]. Last echocardiogram just 1 year ago with 65% ejection fraction mild aortic stenosis with mean gradient 9 mmHg peak velocity 1.8 m/s RVSP 43. Nadine Sher When last seen in March she had been doing quite well though does lead a fairly sedentary lifestyle. Lab work 6/30/2020 CBC normal INR that particular day 3.5 CMP remarkable only for albumin mildly decreased 3.3 glucose 102 creatinine normal 0.91 electrolytes     She denies chest pain, chest pressure, dyspnea, palpitations, lightheadedness, syncope, pnd and orthopnea. She has not recently been hospitalized or seen in an ER. Is mention that perhaps once a month she will have slight lightheadedness if she gets up too fast particularly if she has been sitting on the toilet. She does say however that she does not always maintain adequate hydration and does not always eat regularly. She has had no syncope. She has had no bleeding on anticoagulation and she has no sensation of her atrial fibrillation    Impression  1. Permanent atrial fibrillation: Rate is well controlled at rest. She has no symptoms of CHF to suggest excess rates with activity. She is had no bleeding on anticoagulation this is followed closely at [facility] continue same. Although blood pressure is on the low side usually she says it is 110-120. I suspect some of the relatively low pressures are related to inadequate p.o. intake.  Could consider decreasing metoprolol but she said she felt much better when I increased it from 50-75 because of better control of heart rate. For now continue current medical regimen    2. Stenosis: Mild stenosis at echo 1 year ago the likelihood of progression to significant disease at her age is extraordinarily low and probably 0    3. Dizziness: Almost certainly due to volume depletion given that it only occurs once a month and she does describe relatively frequently actually poor p.o. intake and somewhat erratic eating habits. Courage to maintain hydration      Orders  Return visit 6 months   Urged her to maintain better hydration and to be sure she eats regularly    ROS  No mental status changes. Sometimes fatigue  No fever, chills or sweats. No hematuria. No abdominal pain, hematochezia, or hematemesis. No arthritic complaints. No focal weakness of limbs. No epistaxis. No cold intolerance. No claudication  No visual complaints    Physical exam  Blood pressure 100/80. Heart rate 70. Respiratory rate 12. She appears quite comfortable she is not diaphoretic. There is no elevation jugular venous distention. She has no abdominal pain. There is no edema    Over 50% of patient evaluation involved discussion of diagnosis, pathophysiology, treatment and care coordination. Chief Complaint  6 month follow up   Chief Complaint Free Text:   An interactive audio and video telecommunication system which permitted real time communication was used with the patient consent to complete today's visit. Active Problems   1. Anxiety disorder (300.00) (F41.9)   2. Aortic stenosis (424.1) (I35.0)   · 10/11/2019 echo. LVEF 65%. Moderate LVH. Biatrial enlargement. Moderate aortic      stenosis peak/mean gradients 13/9 mmHg. Peak velocity 1.8 m/s. RVSP 43 mmHg   MAY 2017 echocardiogram: LVEF 66%. Biatrial enlargement. Mild RV dilatation. Aortic      valve area 1.6 cm². Peak/mean gradient 14/7 mmHg. RVSP 31 mm   3.  Asthma (493.90) (J45.909) Sodium 2 MG Oral Tablet; take 1 tabelt daily as directed. managed by pcp office; Therapy: (Recorded:[id number]) to Recorded   6. Warfarin Sodium 2 MG Oral Tablet; TAKE 1 TABLET DAILY; Therapy: [id number] to Recorded   7. Amna Solum 250-50 MCG/DOSE Inhalation Aerosol Powder Breath Activated; INHALE 1   PUFF TWICE DAILY; Therapy: [id number] to Recorded    Meds reviewed with daughter with list on her phone, via phone call. ANoble, LPN     Allergies   1. codeine   Hallucinations;; Updated By: Danielle Potts; 10/22/2019 1:25:31 PM   2. TEGretol TABS   also had abnormal blood levels; Rash; Recorded By: Danielle Potts; 10/22/2019 1:25:32 PM    Immunizations  PCV --- [place]: 01-Oct-2019   PPSV --- [place]: 01-Jan-2011     Pt does not get flu vaccines. ANoble, LPN     Vitals  Daughter says mother does not check bp's. Advised to check if she has a monitor available prior to tomorrow appt. ANoble, LPN     Assessment   1. Permanent atrial fibrillation (427.31) (I48.21)   2. Aortic stenosis (424.1) (I35.0)   3. Dizziness (780.4) (R42)    Plan   1. 6 month follow-up/call if interval problems. Evaluation and Treatment  Follow-up  Status:   Active  Requested for: [id number]   2. Follow up per family physician. Evaluation and Treatment  Follow-up  Status: Active    Requested for: [id number]   7. Access your medical record, request prescriptions, view laboratory and testing done in   our office, request appointments, view immunization records and message your provider   through our safe and secure patient portal. Ask a staff member how   to register or visit us at [email or web address] to get started today.;   Status:Complete;   Done: 08Oct2020   4. Continue same medications/treatment.; Status:Active; Requested for:08Oct2020;    5. Drink plenty of fluids.; Status:Active; Requested for:08Oct2020;    6. Eat a normal well-balanced diet.; Status:Active; Requested for:08Oct2020;    7.  Please bring all medicines, vitamins, and

## 2020-11-23 NOTE — CONSULTS
Physical Medicine & Rehabilitation  Consult Note      Admitting Physician: Heather Ricks MD    Primary Care Provider: Veronika Truong     Reason for Consult:  Asses rehab needs, promote physical and mental function, and decrease likelihood of re-admit to the hospital after discharge. History of Present Illness:    Hyacinth Hawthorne is a 80 y.o. female admitted to [facility] on 11/22/2020. Came in with Severe degenerative change lumbar spine with scoliosis,sacral decube,  fecal impaction, and UTI--may need cardioversion for a flutter. BP is low but looks too good for 60 SBP-will ask for manual cuf--discussed with medical--constanza add IV fluids anf bowel program.  Also RO atonic bladder with bladder scan. I am concerned that she may caudal involvement causing the constipation, bladder incontinence, sacral decube and leg weakness. Back Pain   This is a chronic problem. The current episode started more than 1 year ago. The problem occurs intermittently. The problem has been gradually worsening since onset. The pain is present in the thoracic spine, sacro-iliac and lumbar spine. The quality of the pain is described as aching. The pain is at a severity of 9/10. The pain is severe. The pain is worse during the day. The symptoms are aggravated by bending and position. Stiffness is present in the morning. Associated symptoms include bladder incontinence, bowel incontinence and weakness. Pertinent negatives include no abdominal pain, chest pain, dysuria, fever or headaches. Risk factors include sedentary lifestyle. She has tried chiropractic manipulation, heat, home exercises, analgesics, bed rest, ice, muscle relaxant, NSAIDs and walking for the symptoms. The treatment provided mild relief.             Their inpatient work up has included:    Imaging:    Imaging and other studies reviewed and discussed with patient and staff    Ct Abdomen Pelvis 11/22/2020  CT of the Abdomen and Pelvis with intravenous contrast medium History:  Left lower quadrant pain. Back pain. Left leg weakness. Technical Factors: CT imaging of the abdomen and pelvis were obtained and formatted as 5 mm contiguous axial images from the domes of the diaphragm to the symphysis pubis. Sagittal and coronal reconstructions were also obtained. Oral contrast medium:  None. Intravenous contrast medium:  Isovue-370, 100 mL. Comparison:  CT abdomen pelvis, January 13, 2020. Findings: Lungs:  Lung bases show emphysematous change with minimal to mild dependent subsegmental atelectatic change. Liver:  Normal in size, shape, and decreased in attenuation with focal fatty sparing. 5 mm cystlike area dome, right hepatic lobe (series 2, image 16). Bile Ducts:  Normal in caliber. Gallbladder:  Distended, with high attenuation sludge, or punctate calculi in dependent portion of gallbladder. No para cholecystic fluid. No gallbladder wall thickening. Pancreas:  Normal without masses, cysts, ductal dilatation or calcification. Spleen:  Lower limits of normal in size, unchanged, without masses or calcifications. No splenules. Kidneys:  Cranial caudal dimensions, right and left kidneys, measures 7.6 cm, and 7.2 cm, respectively. No hydronephrosis, masses, or stones. Adrenals:  Normal. Small bowel:  Normal in caliber. Appendix:  Normal. Colon:  Copious stool, rectal vault, with minimal wall thickening, and perirectal fat stranding. Diverticular change, sigmoid colon. Peritoneum:  No ascites, free air, or fluid collections. Vessels: Aorta normal in course and caliber. Portal vein, splenic vein, superior mesenteric vein are patent. Lymph nodes:  Retroperitoneal:  No enlarged retroperitoneal lymph nodes. Mesenteric:  No enlarged mesenteric lymph nodes. Pelvic: No enlarged pelvic lymph nodes. Ureters: Normal in course and caliber. No calcifications. Bladder: No wall thickening. Reproductive organs: No pelvic masses.  Abdominal Wall:  No hernia identified. No diastasis of rectus musculature. No edema or masses. Bones:  No bone lesions. Compression fracture, T9, unchanged. 6 mm anterolisthesis L4 on L5. 6 mm anterolisthesis L5 on S1. Diffuse disc space narrowing L5-S1. Diffuse disc space narrowing T9-10, through L2-L3. Lumbar scoliosis convexity to left apex L1-L2. Right femoral intramedullary zaina. Left femoral intramedullary zaina with femoral neck tension. Sigmoid diverticulosis. Fecal impaction with possible rectal stercoral colitis. Hepatic steatosis with focal fatty sparing. Spleen, lower limits of normal in size, unchanged. Small bilateral kidneys. Cholelithiasis versus high density gallbladder sludge. Severe degenerative change lumbar spine with scoliosis. Remote compression fracture, T9. Grade 1, L4, and grade one, L5, spondylolisthesis, unchanged.                Labs:     labs reviewed and discussed with patient and staff    No results found for: POCGLU  Lab Results   Component Value Date     11/24/2020    K 5.0 11/24/2020    K 4.6 01/13/2020     11/24/2020    CO2 26 11/24/2020    BUN 24 11/24/2020    CREATININE 1.05 11/24/2020    CALCIUM 8.7 11/24/2020    LABALBU 2.7 11/24/2020    BILITOT 4.9 11/24/2020    ALKPHOS 229 11/24/2020     11/24/2020     11/24/2020     Lab Results   Component Value Date    WBC 13.5 11/24/2020    RBC 4.26 11/24/2020    HGB 13.4 11/24/2020    HCT 40.5 11/24/2020    MCV 95.3 11/24/2020    MCH 31.5 11/24/2020    MCHC 33.1 11/24/2020    RDW 14.9 11/24/2020     11/24/2020    MPV 7.6 05/25/2014     Lab Results   Component Value Date    VITD25 28.5 05/17/2014     Lab Results   Component Value Date    COLORU Yellow 11/22/2020    NITRU POSITIVE 11/22/2020    GLUCOSEU Negative 11/22/2020    KETUA Negative 11/22/2020    UROBILINOGEN 1.0 11/22/2020    BILIRUBINUR Negative 11/22/2020     Lab Results   Component Value Date    PROTIME 36.5 11/24/2020     Lab Results   Component Value Date    INR 3.7 11/24/2020         I discussed results with patient. Current Rehabilitation Assessments:    Rehabilitation:  Physical therapy: FIMS:  BedMobility:      Transfers:  ,  ,      FIMS: ,  ,        Occupational therapy: FIMS:   ,  ,           OCCUPATIONAL THERAPY                     LTG:                 Speech therapy: FIMS:          Past Medical History:          Diagnosis Date    Anticoagulated on Coumadin     Arthritis     Asthma     Atrial fibrillation ([facility] [address]     History of blood transfusion     Pneumonia          PastSurgical History:          Procedure Laterality Date    COLONOSCOPY      JOINT REPLACEMENT Bilateral     bilateral femur replacements         Allergies:      Allergies   Allergen Reactions    Carbamazepine And Analogs     Codeine         CurrentMedications:     Current Facility-Administered Medications: albumin human 25 % IV solution 50 g, 50 g, Intravenous, BID  0.9% NaCl with KCl 20 mEq infusion, , Intravenous, Continuous  digoxin (LANOXIN) tablet 125 mcg, 125 mcg, Oral, BID  digoxin (LANOXIN) injection 250 mcg, 250 mcg, Intravenous, Once  amiodarone (CORDARONE) tablet 400 mg, 400 mg, Oral, BID  fleet rectal enema 1 enema, 1 each, Rectal, Once PRN  lactulose (CHRONULAC) 10 GM/15ML solution 30 g, 30 g, Oral, BID  magnesium hydroxide (MILK OF MAGNESIA) 400 MG/5ML suspension 15 mL, 15 mL, Oral, Daily PRN  docusate sodium (COLACE) capsule 100 mg, 100 mg, Oral, Daily  bisacodyl (DULCOLAX) suppository 10 mg, 10 mg, Rectal, Daily PRN  0.9 % sodium chloride infusion, , Intravenous, Continuous  sodium chloride flush 0.9 % injection 10 mL, 10 mL, Intravenous, 2 times per day  sodium chloride flush 0.9 % injection 10 mL, 10 mL, Intravenous, PRN  acetaminophen (TYLENOL) tablet 650 mg, 650 mg, Oral, Q6H PRN **OR** acetaminophen (TYLENOL) suppository 650 mg, 650 mg, Rectal, Q6H PRN  polyethylene glycol (GLYCOLAX) packet 17 g, 17 g, Oral, Daily PRN  promethazine (PHENERGAN) tablet 12.5 mg, 12.5 mg, Oral, Q6H PRN **OR** ondansetron (ZOFRAN) injection 4 mg, 4 mg, Intravenous, Q6H PRN  metoprolol (LOPRESSOR) injection 5 mg, 5 mg, Intravenous, Q6H PRN  vancomycin (VANCOCIN) intermittent dosing (placeholder), , Other, RX Placeholder  warfarin (COUMADIN) daily dosing (placeholder), , Other, RX Placeholder  sulfamethoxazole-trimethoprim (BACTRIM DS;SEPTRA DS) 800-160 MG per tablet 1 tablet, 1 tablet, Oral, 2 times per day  metoprolol tartrate (LOPRESSOR) tablet 25 mg, 25 mg, Oral, 4x Daily      Social History:    Social History     Socioeconomic History    Marital status:      Spouse name: Not on file    Number of children: Not on file    Years of education: Not on file    Highest education level: Not on file   Occupational History    Not on file   Social Needs    Financial resource strain: Not on file    Food insecurity     Worry: Not on file     Inability: Not on file    Transportation needs     Medical: Not on file     Non-medical: Not on file   Tobacco Use    Smoking status: Former Smoker     Packs/day: 0.25     Types: Cigarettes    Smokeless tobacco: Never Used    Tobacco comment: Quit smoking at 18yrs of age   Substance and Sexual Activity    Alcohol use: Not Currently     Comment: occasional    Drug use: Not Currently    Sexual activity: Not Currently     Partners: Male   Lifestyle    Physical activity     Days per week: Not on file     Minutes per session: Not on file    Stress: Not on file   Relationships    Social connections     Talks on phone: More than three times a week     Gets together: More than three times a week     Attends Orthodoxy service: Never     Active member of club or organization: No     Attends meetings of clubs or organizations: Never     Relationship status:      Intimate partner violence     Fear of current or ex partner: No     Emotionally abused: No     Physically abused: No     Forced sexual activity: No   Other Topics Concern    Not on file   Social History Narrative     [address] History                                      Family History:         Problem Relation Age of Onset    High Blood Pressure Mother     Osteoporosis Mother     Other Brother        Review of Systems:    Review of Systems   Constitutional: Positive for activity change and fatigue. Negative for chills, diaphoresis and fever. HENT: Negative for congestion, ear discharge, ear pain, hearing loss, nosebleeds, sore throat and tinnitus. Eyes: Negative for photophobia, pain and redness. Respiratory: Positive for shortness of breath. Negative for cough, wheezing and stridor. Shortness of breath on exertion   Cardiovascular: Negative for chest pain, palpitations and leg swelling. Gastrointestinal: Positive for abdominal distention, bowel incontinence, constipation and nausea. Negative for abdominal pain, anal bleeding, blood in stool, diarrhea and vomiting. Endocrine: Negative for polydipsia. Genitourinary: Positive for bladder incontinence. Negative for dysuria, flank pain, frequency, hematuria and urgency. Musculoskeletal: Positive for back pain, gait problem and myalgias. Negative for neck pain. Skin: Negative for rash. Allergic/Immunologic: Positive for immunocompromised state. Negative for environmental allergies. Neurological: Positive for weakness. Negative for dizziness, tremors, seizures and headaches. Hematological: Does not bruise/bleed easily. Psychiatric/Behavioral: Positive for confusion and dysphoric mood. Negative for hallucinations and suicidal ideas. The patient is not nervous/anxious. Physical Exam:    BP (!) 78/48   Pulse 101   Temp 97.4 °F (36.3 °C) (Oral)   Resp 16   Ht 5' 4\" (1.626 m)   Wt 130 lb 12.8 oz (59.3 kg)   LMP  (LMP Unknown)   SpO2 98%   BMI 22.45 kg/m²      Physical Exam  Constitutional:       General: She is not in acute distress. Appearance: She is well-developed.  She is ill-appearing. She is not toxic-appearing or diaphoretic. HENT:      Head: Normocephalic. Not macrocephalic and not microcephalic. No raccoon eyes or Chau's sign. Mouth/Throat:      Pharynx: Oropharyngeal exudate present. Eyes:      General: No scleral icterus. Right eye: No discharge. Left eye: No discharge. Conjunctiva/sclera: Conjunctivae normal.      Pupils: Pupils are equal, round, and reactive to light. Neck:      Musculoskeletal: Normal range of motion. Spinous process tenderness and muscular tenderness present. Thyroid: No thyromegaly. Vascular: Normal carotid pulses. No carotid bruit, hepatojugular reflux or JVD. Trachea: No tracheal deviation. Pulmonary:      Effort: Pulmonary effort is normal.      Breath sounds: No stridor. Decreased breath sounds present. Abdominal:       Musculoskeletal:      Cervical back: She exhibits decreased range of motion and tenderness. Thoracic back: She exhibits decreased range of motion and tenderness. Lumbar back: She exhibits decreased range of motion and tenderness. Skin:     General: Skin is warm and dry. Coloration: Skin is pale. Findings: Bruising present. Comments: Old IV sites   Neurological:      Sensory: Sensory deficit present. Coordination: Coordination abnormal.      Gait: Gait abnormal.      Deep Tendon Reflexes:      Reflex Scores:       Tricep reflexes are 1+ on the right side and 1+ on the left side. Bicep reflexes are 1+ on the right side and 1+ on the left side. Brachioradialis reflexes are 1+ on the right side and 1+ on the left side. Patellar reflexes are 0 on the right side and 0 on the left side. Achilles reflexes are 0 on the right side and 0 on the left side. Psychiatric:         Attention and Perception: She is attentive. Mood and Affect: Mood is not anxious or depressed. Affect is not angry. Speech: Speech is delayed.  Speech is not rapid and pressured. Behavior: Behavior is slowed. Behavior is not withdrawn. Thought Content: Thought content normal.         Cognition and Memory: Memory is not impaired. She exhibits impaired recent memory. She does not exhibit impaired remote memory. Judgment: Judgment is not impulsive or inappropriate. Ortho Exam  Neurologic Exam     Cranial Nerves     CN III, IV, VI   Pupils are equal, round, and reactive to light.     Gait, Coordination, and Reflexes     Reflexes   Right brachioradialis: 1+  Left brachioradialis: 1+  Right biceps: 1+  Left biceps: 1+  Right triceps: 1+  Left triceps: 1+  Right patellar: 0  Left patellar: 0  Right achilles: 0  Left achilles: 0            Diagnostics:    Recent Results (from the past 24 hour(s))   COVID-19    Collection Time: 11/23/20  3:23 PM   Result Value Ref Range    SARS-CoV-2, NAAT Not Detected Not Detected   EKG 12 Lead    Collection Time: 11/24/20  1:06 AM   Result Value Ref Range    Ventricular Rate 105 BPM    Atrial Rate 105 BPM    P-R Interval 142 ms    QRS Duration 72 ms    Q-T Interval 398 ms    QTc Calculation (Bazett) 526 ms    P Axis 30 degrees    R Axis -19 degrees    T Axis -12 degrees   CBC Auto Differential    Collection Time: 11/24/20  6:37 AM   Result Value Ref Range    WBC 13.5 (H) 4.8 - 10.8 K/uL    RBC 4.26 4.20 - 5.40 M/uL    Hemoglobin 13.4 12.0 - 16.0 g/dL    Hematocrit 40.5 37.0 - 47.0 %    MCV 95.3 82.0 - 100.0 fL    MCH 31.5 (H) 27.0 - 31.3 pg    MCHC 33.1 33.0 - 37.0 %    RDW 14.9 (H) 11.5 - 14.5 %    Platelets 691 839 - 727 K/uL    Neutrophils % 90.2 %    Lymphocytes % 6.0 %    Monocytes % 3.7 %    Eosinophils % 0.0 %    Basophils % 0.1 %    Neutrophils Absolute 12.2 (H) 1.4 - 6.5 K/uL    Lymphocytes Absolute 0.8 (L) 1.0 - 4.8 K/uL    Monocytes Absolute 0.5 0.2 - 0.8 K/uL    Eosinophils Absolute 0.0 0.0 - 0.7 K/uL    Basophils Absolute 0.0 0.0 - 0.2 K/uL   Comprehensive Metabolic Panel    Collection Time: 11/24/20  6:37 AM   Result Value Ref Range    Sodium 136 135 - 144 mEq/L    Potassium 5.0 (H) 3.4 - 4.9 mEq/L    Chloride 100 95 - 107 mEq/L    CO2 26 20 - 31 mEq/L    Anion Gap 10 9 - 15 mEq/L    Glucose 76 70 - 99 mg/dL    BUN 24 (H) 8 - 23 mg/dL    CREATININE 1.05 (H) 0.50 - 0.90 mg/dL    GFR Non-African American 49.5 (L) >60    GFR  59.9 (L) >60    Calcium 8.7 8.5 - 9.9 mg/dL    Total Protein 6.1 (L) 6.3 - 8.0 g/dL    Alb 2.7 (L) 3.5 - 4.6 g/dL    Total Bilirubin 4.9 (H) 0.2 - 0.7 mg/dL    Alkaline Phosphatase 229 (H) 40 - 130 U/L     (H) 0 - 33 U/L     (H) 0 - 35 U/L    Globulin 3.4 2.3 - 3.5 g/dL   Magnesium    Collection Time: 11/24/20  6:37 AM   Result Value Ref Range    Magnesium 2.0 1.7 - 2.4 mg/dL   PROTIME-INR    Collection Time: 11/24/20  6:37 AM   Result Value Ref Range    Protime 36.5 (H) 12.3 - 14.9 sec    INR 3.7             Impression:    1. Impaired mobility and ADLs due to severe djd l spine-ro cauda equina syndrome  2. Urinary incontinence  3. Sacral decube  4. Severe constipation  5. Fatigue and Malaise      Complex Active General Medical Issues thatcomplicate care Assess & Plan:     1. Active Problems:    Atrial fibrillation with rapid ventricular response (HCC)    Gait abnormality    DANNY (generalized anxiety disorder)    GERD (gastroesophageal reflux disease)    Hyperlipidemia    Major depressive disorder    Osteoporosis    Recurrent major depressive disorder, in full remission ([address]    Vitamin D deficiency    Sepsis due to urinary tract infection ([address]    Pressure injury of skin of contiguous region involving back, buttock, and hip    Constipation, acute    LFT elevation    Hyperkalemia  Resolved Problems:    * No resolved hospital problems. *            Recommendations:    1.  Considering all of the factors aboveincluding the patient's current medical status, social status/home envirnment, their functional needs and their ability to participate in a therapy program, I feel that they would best be served at a  acute   Rehabilitationlevel of care. It is my opinion that they will be able to tolerate and benefit from 3 hours of therapy a day. I reviewed the varous local options re these levels of care with the patient and family. 2. Nursing care to focus on bowel and bladder issues. 3. Vitamin B12 shot times one  4. Improve hydration and Nutrition by adding Proteinex and push PO fluids       It was my pleasure toevaluate Marietta Arenas today. Please call [phone] with questions.     Anisa Boyer, DO

## 2020-11-23 NOTE — FLOWSHEET NOTE
Perfect serve sent to Dr. León Mckinney regarding patient's pressure of 86/64 and heart rate of 101, in a-flutter. Notified him of me holding the scheduled lopressor and he gave me new orders consisting of a normal saline bolus. See MAR for new orders.   Electronically signed by Saroj North RN on 11/22/2020 at 10:25 PM

## 2020-11-23 NOTE — PROGRESS NOTES
Clinical Pharmacy Note    Warfarin consult follow-up    Recent Labs     11/23/20  0624   INR 2.6     Recent Labs     11/22/20  0230 11/23/20  0624   HGB 14.1 13.8   HCT 42.5 42.0    202       Significant drug:drug interactions:  APAP, Bactrim DS      Notes:  Date INR Warfarin Dose   11/22/20 3.3 HOLD    11/23/20 2.6   2 mg                                              INR is therapeutic at 2.6. Will give warfarin 2 mg today. Continue to monitor due to Bactrim - Lexicomp recommends possibly reducing dose by 10-20%. Daily PT/INR during pharmacy consult to monitor and dose warfarin therapy.     Austin Meredith, PharmD  11/23/2020 3:26 PM

## 2020-11-23 NOTE — PROGRESS NOTES
Physical Therapy Missed Treatment   Facility/Department: [facility] MED SURG [id number]    NAME: Cora Pearson    : 1933 (80 y.o.)  MRN: [id number]    Account: [de-identified]  Gender: female      Discussed case with nursing. Pts covid test just now sent. Nursing Ok for pt to be seen prior to test results being back. Attempted to see pt with pt adamantly declining therapy.  Discussed benefits of therapy with pt and she continued to decline    Sarah Luo, PT, 20 at 3:30 PM

## 2020-11-23 NOTE — PROGRESS NOTES
Hospitalist Progress Note      Date of Admission: 11/22/2020  Chief Complaint:    Chief Complaint   Patient presents with    Back Pain     week long; shooting up and down left leg    Tailbone Pain     decubitus     Subjective:  No new complaints, no nausea vomiting chest pain headache    Medications:    Infusion Medications   Scheduled Medications    warfarin  2 mg Oral Once    sodium chloride flush  10 mL Intravenous 2 times per day    vancomycin (VANCOCIN) intermittent dosing (placeholder)   Other RX Placeholder    warfarin (COUMADIN) daily dosing (placeholder)   Other RX Placeholder    sulfamethoxazole-trimethoprim  1 tablet Oral 2 times per day    metoprolol tartrate  25 mg Oral 4x Daily     PRN Meds: sodium chloride flush, acetaminophen **OR** acetaminophen, polyethylene glycol, promethazine **OR** ondansetron, metoprolol    Intake/Output Summary (Last 24 hours) at 11/23/2020 1534  Last data filed at 11/23/2020 0816  Gross per 24 hour   Intake 5 ml   Output 800 ml   Net -795 ml     Exam:  BP (!) 83/58   Pulse 97   Temp 98.2 °F (36.8 °C) (Oral)   Resp 20   Ht 5' 4\" (1.626 m)   Wt 130 lb 12.8 oz (59.3 kg)   LMP  (LMP Unknown)   SpO2 99%   BMI 22.45 kg/m²   Head: Normocephalic, atraumatic  Sclera clear  Neck supple, nontender  Lungs: Normal effort of breathing    Labs:   Recent Labs     11/22/20  0230 11/23/20  0624   WBC 8.3 7.0   HGB 14.1 13.8   HCT 42.5 42.0    202     Recent Labs     11/22/20  0230 11/22/20  0247 11/23/20  0624     --  136   K 3.7  --  4.3   CL 99  --  103   CO2 29  --  26   BUN 22  --  15   CREATININE 0.59 0.7 0.55   CALCIUM 9.2  --  8.4*   AST 29  --  198*   ALT 8  --  57*   BILITOT 1.0*  --  1.5*   ALKPHOS 64  --  126     Recent Labs     11/22/20  0852 11/23/20  0624   INR 3.3 2.6     Recent Labs     11/22/20  0852 11/22/20  1422   TROPONINI <0.010 <0.010     Radiology:  RADIOLOGY REPORT   Final Result      CT ABDOMEN PELVIS W IV CONTRAST Additional Contrast? None   Final Result      Sigmoid diverticulosis. Fecal impaction with possible rectal stercoral colitis. Hepatic steatosis with focal fatty sparing. Spleen, lower limits of normal in size, unchanged. Small bilateral kidneys. Cholelithiasis versus high density gallbladder sludge. Severe degenerative change lumbar spine with scoliosis. Remote compression fracture, T9. Grade 1, L4, and grade one, L5, spondylolisthesis, unchanged. All CT scans at this facility use dose modulation, iterative reconstruction, and/or weight based dosing when appropriate to reduce radiation dose to as low as reasonably achievable. XR CHEST (2 VW)    (Results Pending)     Assessment/Plan:    Atrial flutter: Following with cardiology. Monitor on telemetry    Generalized weakness of lower extremities further complicated by decubitus ulcer. Appreciate infectious disease recommendations, agree. PT OT evaluation to assist in disposition.     On anticoagulation for history of atrial fibrillation, Coumadin dosing as per pharmacy    35 minutes total care time, >1/2 in unit/floor time and care coordination     Electronically signed by Violette Ansari MD on 11/23/2020 at 3:34 PM

## 2020-11-24 PROBLEM — K59.00 CONSTIPATION, ACUTE: Status: ACTIVE | Noted: 2020-01-01

## 2020-11-24 PROBLEM — R79.89 LFT ELEVATION: Status: ACTIVE | Noted: 2020-01-01

## 2020-11-24 PROBLEM — N39.0 SEPSIS DUE TO URINARY TRACT INFECTION (HCC): Status: ACTIVE | Noted: 2020-01-01

## 2020-11-24 PROBLEM — E87.5 HYPERKALEMIA: Status: ACTIVE | Noted: 2020-01-01

## 2020-11-24 PROBLEM — A41.9 SEPSIS DUE TO URINARY TRACT INFECTION (HCC): Status: ACTIVE | Noted: 2020-01-01

## 2020-11-24 PROBLEM — L89.40: Status: ACTIVE | Noted: 2020-01-01

## 2020-11-24 NOTE — PROGRESS NOTES
Physical Therapy Missed Treatment   Facility/Department: [facility] MED SURG [id number]    NAME: Agatha Kamara    : 1933 (80 y.o.)  MRN: [id number]    Account: [de-identified]  Gender: female      PT evaluation and treatment orders received. Chart reviewed. PT evaluation attempted. Pt's BP 56/42, then 62/50. Hold per RN. Will follow and attempt PT evaluation again at earliest availability.        Tsering Millard, PT, 20 at 10:07 AM

## 2020-11-24 NOTE — PROGRESS NOTES
Hospitalist Progress Note      Date of Admission: 11/22/2020  Chief Complaint:    Chief Complaint   Patient presents with    Back Pain     week long; shooting up and down left leg    Tailbone Pain     decubitus     Subjective:  No new complaints, no nausea vomiting chest pain headache    Medications:    Infusion Medications    0.9% NaCl with KCl 20 mEq      sodium chloride       Scheduled Medications    albumin human  50 g Intravenous BID    digoxin  125 mcg Oral BID    digoxin  250 mcg Intravenous Once    amiodarone  400 mg Oral BID    lactulose  30 g Oral BID    docusate sodium  100 mg Oral Daily    sodium chloride flush  10 mL Intravenous 2 times per day    vancomycin (VANCOCIN) intermittent dosing (placeholder)   Other RX Placeholder    warfarin (COUMADIN) daily dosing (placeholder)   Other RX Placeholder    sulfamethoxazole-trimethoprim  1 tablet Oral 2 times per day    metoprolol tartrate  25 mg Oral 4x Daily     PRN Meds: fleet, magnesium hydroxide, bisacodyl, sodium chloride flush, acetaminophen **OR** acetaminophen, polyethylene glycol, promethazine **OR** ondansetron, metoprolol  No intake or output data in the 24 hours ending 11/24/20 1229  Exam:  BP (!) 63/46   Pulse 101   Temp 97.4 °F (36.3 °C) (Oral)   Resp 16   Ht 5' 4\" (1.626 m)   Wt 130 lb 12.8 oz (59.3 kg)   LMP  (LMP Unknown)   SpO2 98%   BMI 22.45 kg/m²   Head: Normocephalic, atraumatic  Sclera clear  Neck supple, nontender  Lungs: Normal effort of breathing    Labs:   Recent Labs     11/22/20  0230 11/23/20  0624 11/24/20  0637   WBC 8.3 7.0 13.5*   HGB 14.1 13.8 13.4   HCT 42.5 42.0 40.5    202 189     Recent Labs     11/22/20  0230 11/22/20  0247 11/23/20  0624 11/24/20  0637     --  136 136   K 3.7  --  4.3 5.0*   CL 99  --  103 100   CO2 29  --  26 26   BUN 22  --  15 24*   CREATININE 0.59 0.7 0.55 1.05*   CALCIUM 9.2  --  8.4* 8.7   AST 29  --  198* 285*   ALT 8  --  57* 128*   BILITOT 1.0*  --  1.5* 4.9* ALKPHOS 64  --  126 229*     Recent Labs     11/22/20  0852 11/23/20  0624 11/24/20  0637   INR 3.3 2.6 3.7     Recent Labs     11/22/20  0852 11/22/20  1422   TROPONINI <0.010 <0.010     Radiology:  RADIOLOGY REPORT   Final Result      XR CHEST (2 VW)   Final Result   SEVERE DEGENERATIVE CHANGE, BILATERAL SHOULDERS. THORACOLUMBAR SCOLIOSIS. BILATERAL LUNG CHANGES COMPATIBLE WITH CLINICAL HISTORY OF PULMONARY FIBROSIS. NO ACUTE FINDINGS. CT ABDOMEN PELVIS W IV CONTRAST Additional Contrast? None   Final Result      Sigmoid diverticulosis. Fecal impaction with possible rectal stercoral colitis. Hepatic steatosis with focal fatty sparing. Spleen, lower limits of normal in size, unchanged. Small bilateral kidneys. Cholelithiasis versus high density gallbladder sludge. Severe degenerative change lumbar spine with scoliosis. Remote compression fracture, T9. Grade 1, L4, and grade one, L5, spondylolisthesis, unchanged. All CT scans at this facility use dose modulation, iterative reconstruction, and/or weight based dosing when appropriate to reduce radiation dose to as low as reasonably achievable. US ABDOMEN LIMITED Specify organ? LIVER, GALLBLADDER    (Results Pending)     Assessment/Plan:    Atrial flutter: Following with cardiology. With rate controlled, patient more hypotensive. I do not think the patient has indicated drop in volume causing hypotension, patient likely to benefit from cardioversion if deemed appropriate by cardiology; sooner rather than later. monitor on telemetry    Generalized weakness of lower extremities further complicated by decubitus ulcer. Appreciate infectious disease recommendations, agree. PT OT evaluation to assist in disposition. lft increase, abd US. Could also be sec to current hemodynamic status.       On anticoagulation for history of atrial fibrillation, Coumadin dosing as per pharmacy    35 minutes total care time, >1/2 in unit/floor time and care coordination     Electronically signed by Lorri Solorio MD on 11/24/2020 at 12:29 PM

## 2020-11-24 NOTE — PROGRESS NOTES
Clinical Pharmacy Note    Warfarin consult follow-up    Recent Labs     11/24/20  0637   INR 3.7     Recent Labs     11/22/20  0230 11/23/20  0624 11/24/20  0637   HGB 14.1 13.8 13.4   HCT 42.5 42.0 40.5    202 189       Significant drug:drug interactions: Bactrim    Diet: Recent intake:  decreased intake pta/unintended weight loss     Notes:  Date INR Warfarin Dose    11/22/20  3.3  HOLD    11/23/20  2.6   2 mg    11/24/20  3.7  HOLD                                     INR is supratherapeutic at 3.7, goal 2-3 indicated for afib. Recent decreased PO intake and bactrim DDI likely reason for elevated INR. Will HOLD tonight's warfarin dose. Daily PT/INR during pharmacy consult to monitor and dose warfarin therapy. Thank you,    Buffy Quinn, PharmD.   2:15 PM 11/24/2020

## 2020-11-24 NOTE — PROGRESS NOTES
Physical Therapy Missed Treatment   Facility/Department: [facility] MED SURG Z002/[id number]    NAME: Paula Harrison    : 1933 (80 y.o.)  MRN: [id number]    Account: [de-identified]  Gender: female      Attempted to see pt for evaluation. Pt with echo for testing. Will follow and attempt PT evaluation again at earliest availability.        David Barahona, PT, 20 at 8:26 AM

## 2020-11-24 NOTE — PROGRESS NOTES
Infectious Diseases Inpatient Progress Note          HISTORY OF PRESENT ILLNESS:  Follow up UTI on p.o. Bactrim. Patient has atrial flutter with severe hypotension, was given 1 L of IV fluid. Has leukocytosis and elevated liver function test with hyperbilirubinemia. Denies any cough or shortness of breath. Has diffuse moderate abdominal pain and discomfort. Severe constipation with last bowel movement being 4 days ago. Weak all over with poor appetite. On p.o. Bactrim for UTI. Current Medications:     albumin human  50 g Intravenous BID    digoxin  125 mcg Oral BID    digoxin  250 mcg Intravenous Once    amiodarone  400 mg Oral BID    lactulose  30 g Oral BID    docusate sodium  100 mg Oral Daily    sodium chloride flush  10 mL Intravenous 2 times per day    vancomycin (VANCOCIN) intermittent dosing (placeholder)   Other RX Placeholder    warfarin (COUMADIN) daily dosing (placeholder)   Other RX Placeholder    sulfamethoxazole-trimethoprim  1 tablet Oral 2 times per day    metoprolol tartrate  25 mg Oral 4x Daily       Allergies:  Carbamazepine and analogs and Codeine      Review of Systems  14 system review is negative other than HPI    Physical Exam  Vitals:    11/24/20 1113 11/24/20 1114 11/24/20 1233 11/24/20 1234   BP: (!) 68/50 (!) 63/46 (!) 66/42 (!) 78/48   Pulse:       Resp:       Temp:       TempSrc:       SpO2:       Weight:       Height:         General Appearance: alert and oriented to person, place and time, well-developed and well-nourished, in no acute distress, 2 L nasal cannula  Weak looking  Skin: warm and dry, no rash. Head: normocephalic and atraumatic  Eyes: anicteric sclerae  ENT: oropharynx clear and moist with normal mucous membranes.  No oral thrush  Lungs: normal respiratory effort, diminished breath sounds with bilateral rales  Heart irregular heartbeats no murmur  Abdomen: soft, no tenderness  No leg edema  No erythema, no tenderness  Pure wick with bloody contiguous region involving back, buttock, and hip    Constipation, acute    LFT elevation    Hyperkalemia       PLAN:  · Change Bactrim to IV Zosyn for possible cholecystitis/ sepsis  · Check repeat blood culture  · Follow-up CBC complete metabolic profile  · Check abdominal ultrasound  · DC vancomycin    Discussed with patient and RN    Tyesha Moore MD

## 2020-11-24 NOTE — PROGRESS NOTES
[facility] Occupational Therapy      Date: 2020  Patient Name: Zhanna Christopher        MRN: [id number]  Account: [de-identified]   : 1933  (80 y.o.)  Room: [facility]/[place] [address]    Chart reviewed, attempted OT at 0940 for eval. Patient not seen 2° to: Other: Pt. BP low this AM per nursing notes. Checked BP and pt. 50/40. MOHAN Mckeon present, hold OT at this time. Spoke to [employer], RN aware. Will attempt again when able.     Electronically signed by JAMES Rubi on 2020 at 10:06 AM

## 2020-11-24 NOTE — PROGRESS NOTES
[facility] Cardiology Progress Note        Date:   2020    Patient:    Amparo Cervantes    :    1933  CSN:    [id number]    Rounding Cardiologist: Zenaida Dill MD     Primary Cardiologist: Lenny Spicer MD  [facility]    Requesting Physician:  Marcos Rubio MD      Reason for Consult:  Afib w RVR. Subjective:    Patient still with abdominal pain and now with worsening liver function studies and elevated sed rate suggesting abdominal process most likely. Urinalysis suggest urinary tract infection as well. Echocardiogram notes normal LV systolic function, LVEF 58% with evidence of diastolic dysfunction but no significant valvular heart disease ( Mild Moderate AS only). Troponins have been negative to date. Despite recorded blood pressures in the 55C to 38L systolically patient is comfortable, alert and without distress. Not sure if blood pressure measurements are accurate. Patient has a history of permanent atrial fibrillation rate control. Rates are up currently probably due to underlying medical illness. Hospitalist asked for possible cardioversion. Discussed in detail with the patient. Would suggest adjustment of underlying medical problems with additional IV fluids possible albumin at this time. Possible cardioversion tomorrow or earlier if warranted. No complaints. No CP or SOB. 14 system General and Cardiac ROS otherwise negative or unchanged. Assessment:    1. Bilateral lower extremity weakness, progressive  2. Lower back pain  3. Elevated LFTs / Abdominal discomfort. 4. Urinary tract infection  5. Permanent atrial fibrillation, with RVR , now on IV Cardizem. 6. Long-term anticoagulation, current Coumadin  7. Negative Myoview perfusion stress test 10/2019,  8. Normal LV systolic function by echocardiogram 10/2019, ejection fraction 66%, Same by echo .  9. Aortic valve stenosis, mild, V-max 1.8 m/s. Same by Echo .   10. Pulmonary fibrosis 11. Asthma  12. Mild pulmonary hypertension RVSP 43 last.  13. Past smoker  14. Hypothyroidism  15. GERD  16. Depression  17. Overweight  18. Degenerative joint disease, lumbar spine stenosis with neuropathy. 19. Otherwise as prior documentation. Plan:    1. Cardiac Supportive Care  2. Primary care to treat underlying urinary tract infection and medical needs. 3. Albumin IV and IV and PO hydration for hypotension. 4. Conservative cardiovascular care with rate control for atrial fibrillation. 5. Consider Cardioversion in AM or earlier if needed, discussed with pt and  regarding risk goals and alternatives of the procedure. They agree and wish to proceed if needed. 6. Continue Coumadin unless other procedures are warranted, hospital pharmacy to manage. 7. Continue prior home cardiovascular medications with appropriate adjustments for heart rate and blood pressure as needed. 8. Serial telemetry. 9. Serial laboratories including troponins. 10. Further Recommendations to follow  11. See Orders    ++++++++++++++++++++++++++++++++++    HISTORY OF PRESENT ILLNESS:      Shawn Hendrix is a pleasant 80 y.o. female with with the above-noted past cardiac and medical history including permanent atrial fibrillation on chronic Coumadin anticoagulation, mild aortic valve stenosis, preserved left ventricular function, pulmonary fibrosis, asthma who now presents with bilateral lower extremity weakness and back pain. Patient has had progression of her symptomatology over the last several years but more recently over the last month. She feels too weak to get up and do her normal chores. She cannot climb steps now. She lives alone. She came in for evaluation through the emergency room. She was noted to have rapid atrial fibrillation. INR was 3.3. Initial troponin was negative. She was found to have urinary tract infection. She was admitted for further evaluation and possible placement.   She is currently in isolation precautions. Cardiology was asked to see for further relation consultation. Patient Follows with Simone Neely MD [facility]. See his last note attached below. Patient History and Records, EMR reviewed. Patient Interviewed and examined. Fair historian. Denies CP, SOB, LH, Dizziness, TIA or CVA Symptoms. No Orthopnea, Edema or CHF symptoms. No Palpitations. No Syncope. No Fever, Chills or Cold symptoms. No GI or Bleeding complaints. Cardiac and general ROS otherwise negative. [address] otherwise negative other than noted. Past Medical History:   Diagnosis Date    Anticoagulated on Coumadin     Arthritis     Asthma     Atrial fibrillation ([address]     History of blood transfusion     Pneumonia        Past Surgical History:   Procedure Laterality Date    COLONOSCOPY      JOINT REPLACEMENT Bilateral     bilateral femur replacements       Prior to Admission medications    Medication Sig Start Date End Date Taking? Authorizing Provider   traMADol (ULTRAM) 50 MG tablet Take 50 mg by mouth every 8 hours as needed for Pain.    Yes Historical Provider, MD   metoprolol tartrate (LOPRESSOR) 50 MG tablet Take 75 mg by mouth daily   Yes Historical Provider, MD   fluticasone-salmeterol (ADVAIR DISKUS) 250-50 MCG/DOSE AEPB Inhale 1 puff into the lungs 12/28/17  Yes Historical Provider, MD   warfarin (COUMADIN) 5 MG tablet Take 5 mg by mouth 7/27/17  Yes Historical Provider, MD       Scheduled Meds:   sodium chloride  500 mL Intravenous Once    sodium chloride flush  10 mL Intravenous 2 times per day    vancomycin (VANCOCIN) intermittent dosing (placeholder)   Other RX Placeholder    warfarin (COUMADIN) daily dosing (placeholder)   Other RX Placeholder    sulfamethoxazole-trimethoprim  1 tablet Oral 2 times per day    metoprolol tartrate  25 mg Oral 4x Daily     Continuous Infusions:    PRN Meds:sodium chloride flush, acetaminophen **OR** acetaminophen, polyethylene glycol, promethazine **OR** ondansetron, metoprolol    Allergies   Allergen Reactions    Carbamazepine And Analogs     Codeine        Social History     Socioeconomic History    Marital status:      Spouse name: Not on file    Number of children: Not on file    Years of education: Not on file    Highest education level: Not on file   Occupational History    Not on file   Social Needs    Financial resource strain: Not on file    Food insecurity     Worry: Not on file     Inability: Not on file    Transportation needs     Medical: Not on file     Non-medical: Not on file   Tobacco Use    Smoking status: Former Smoker     Packs/day: 0.25     Types: Cigarettes    Smokeless tobacco: Never Used    Tobacco comment: Quit smoking at 18yrs of age   Substance and Sexual Activity    Alcohol use: Not Currently     Comment: occasional    Drug use: Not Currently    Sexual activity: Not Currently   Lifestyle    Physical activity     Days per week: Not on file     Minutes per session: Not on file    Stress: Not on file   Relationships    Social connections     Talks on phone: Not on file     Gets together: Not on file     Attends Yazidi service: Not on file     Active member of club or organization: Not on file     Attends meetings of clubs or organizations: Not on file     Relationship status: Not on file    Intimate partner violence     Fear of current or ex partner: Not on file     Emotionally abused: Not on file     Physically abused: Not on file     Forced sexual activity: Not on file   Other Topics Concern    Not on file   Social History Narrative     [address]                          Family History   Problem Relation Age of Onset    High Blood Pressure Mother     Osteoporosis Mother     Other Brother        Review Of Systems:    14 point ROS negative other than mentioned.      Physical Exam:    CURRENT VITALS: BP (!) 73/40   Pulse 97   Temp 98.2 °F (36.8 °C) (Oral)   Resp 20   Ht 5' 4\" (1.626 m)   Wt 130 lb 12.8 oz (59.3 kg)   LMP  (LMP Unknown)   SpO2 99%   BMI 22.45 kg/m²     Per Staff and Notes:  CONSTITUTIONAL:  awake, alert, cooperative, no apparent distress,   ENT:  Normocephalic, without obvious abnormality, atraumatic, sinuses nontender on palpation, external ears without lesions,  NECK:  Supple, symmetrical, trachea midline, no adenopathy, thyroid symmetric, not enlarged and no tenderness, skin normal, No bruits. LUNGS:  No increased work of breathing, good air exchange, clear to auscultation bilaterally, no crackles, no wheezing  CARDIOVASCULAR:  Normal apical impulse, irregular rate and rhythm, normal S1 and S2,  2/6 Systolic murmur noted. ABDOMEN:  Obese, normal bowel sounds, soft, non-distended, mildly tender, no masses palpated, no hepatosplenomegally  EXTREMETIES: No edema, Pulses Strong Thruout. No ulcers. NEUROLOGIC:  Awake, alert, oriented to name, place and time. Following all commands and moving all extremties.   SKIN:  no bruising or bleeding, normal skin color, texture, turgor and no rashes    Labs:  Recent Results (from the past 24 hour(s))   COVID-19    Collection Time: 11/23/20  3:23 PM   Result Value Ref Range    SARS-CoV-2, NAAT Not Detected Not Detected   EKG 12 Lead    Collection Time: 11/24/20  1:06 AM   Result Value Ref Range    Ventricular Rate 105 BPM    Atrial Rate 105 BPM    P-R Interval 142 ms    QRS Duration 72 ms    Q-T Interval 398 ms    QTc Calculation (Bazett) 526 ms    P Axis 30 degrees    R Axis -19 degrees    T Axis -12 degrees   CBC Auto Differential    Collection Time: 11/24/20  6:37 AM   Result Value Ref Range    WBC 13.5 (H) 4.8 - 10.8 K/uL    RBC 4.26 4.20 - 5.40 M/uL    Hemoglobin 13.4 12.0 - 16.0 g/dL    Hematocrit 40.5 37.0 - 47.0 %    MCV 95.3 82.0 - 100.0 fL    MCH 31.5 (H) 27.0 - 31.3 pg    MCHC 33.1 33.0 - 37.0 %    RDW 14.9 (H) 11.5 - 14.5 %    Platelets 162 572 - 578 K/uL    Neutrophils % 90.2 %    Lymphocytes % 6.0 %    Monocytes % 3.7 %    Eosinophils % 0.0 %    Basophils % 0.1 %    Neutrophils Absolute 12.2 (H) 1.4 - 6.5 K/uL    Lymphocytes Absolute 0.8 (L) 1.0 - 4.8 K/uL    Monocytes Absolute 0.5 0.2 - 0.8 K/uL    Eosinophils Absolute 0.0 0.0 - 0.7 K/uL    Basophils Absolute 0.0 0.0 - 0.2 K/uL   PROTIME-INR    Collection Time: 11/24/20  6:37 AM   Result Value Ref Range    Protime 36.5 (H) 12.3 - 14.9 sec    INR 3.7        ECG:     Afib, rate 80s    TELEMETRY:  Afib / Flutter , 105 range. ECHO:     1. Abnormal echocardiogram.    2. Normal left ventricular systolic function, LVEF 65 %.    3. Moderate Aortic valve stenosis with estimated aortic valve area 1.1    cm2.    4. Moderate AI, MR.    5. Moderate Left ventricular hypertrophy.    6. Moderate Bi atrial enlargement.    7. Normal pericardium. Ravi Gómez MD  [facility] Cardiologist      Electronically signed on 11/22/20 at 9:16 AM EST      -----    Dr Tg Giles Last Office Note:    Subjective  PCP: Yanelis   Subjective: Mrs. Basilio Barnett follows here for mild-moderate aortic stenosis and permanent atrial fibrillation. She is on chronic anticoagulation with Coumadin managed by at [facility] [place], [facility]. Last echocardiogram just 1 year ago with 65% ejection fraction mild aortic stenosis with mean gradient 9 mmHg peak velocity 1.8 m/s RVSP 43. Jose Ra When last seen in March she had been doing quite well though does lead a fairly sedentary lifestyle. Lab work 6/30/2020 CBC normal INR that particular day 3.5 CMP remarkable only for albumin mildly decreased 3.3 glucose 102 creatinine normal 0.91 electrolytes     She denies chest pain, chest pressure, dyspnea, palpitations, lightheadedness, syncope, pnd and orthopnea. She has not recently been hospitalized or seen in an ER. Is mention that perhaps once a month she will have slight lightheadedness if she gets up too fast particularly if she has been sitting on the toilet.  She does say however that she does not always maintain adequate hydration and does not always eat regularly. She has had no syncope. She has had no bleeding on anticoagulation and she has no sensation of her atrial fibrillation    Impression  1. Permanent atrial fibrillation: Rate is well controlled at rest. She has no symptoms of CHF to suggest excess rates with activity. She is had no bleeding on anticoagulation this is [facility] continue same. Although blood pressure is on the low side usually she says it is 110-120. I suspect some of the relatively low pressures are related to inadequate p.o. intake. Could consider decreasing metoprolol but she said she felt much better when I increased it from 50-75 because of better control of heart rate. For now continue current medical regimen    2. Stenosis: Mild stenosis at echo 1 year ago the likelihood of progression to significant disease at her age is extraordinarily low and probably 0    3. Dizziness: Almost certainly due to volume depletion given that it only occurs once a month and she does describe relatively frequently actually poor p.o. intake and somewhat erratic eating habits. Courage to maintain hydration      Orders  Return visit 6 months   Urged her to maintain better hydration and to be sure she eats regularly    ROS  No mental status changes. Sometimes fatigue  No fever, chills or sweats. No hematuria. No abdominal pain, hematochezia, or hematemesis. No arthritic complaints. No focal weakness of limbs. No epistaxis. No cold intolerance. No claudication  No visual complaints    Physical exam  Blood pressure 100/80. Heart rate 70. Respiratory rate 12. She appears quite comfortable she is not diaphoretic. There is no elevation jugular venous distention. She has no abdominal pain. There is no edema    Over 50% of patient evaluation involved discussion of diagnosis, pathophysiology, treatment and care coordination.       Chief Complaint  6 month follow up   Chief Complaint Free Text:   An interactive audio and video telecommunication system which permitted real time communication was used with the patient consent to complete today's visit. Active Problems   1. Anxiety disorder (300.00) (F41.9)   2. Aortic stenosis (424.1) (I35.0)   · 10/11/2019 echo. LVEF 65%. Moderate LVH. Biatrial enlargement. Moderate aortic      stenosis peak/mean gradients 13/9 mmHg. Peak velocity 1.8 m/s. RVSP 43 mmHg   MAY 2017 echocardiogram: LVEF 66%. Biatrial enlargement. Mild RV dilatation. Aortic      valve area 1.6 cm². Peak/mean gradient 14/7 mmHg. RVSP 31 mm   3. Asthma (493.90) (J45.909)   4. Chronic GERD (530.81) (K21.9)   5. Depression (311) (F32.9)   6. Dizziness (780.4) (R42)   7. High risk medication use (V58.69) (Z79.899)   8. Hyperlipidemia (272.4) (E78.5)   9. Hypothyroidism (244.9) (E03.9)   10. Normal cardiac stress test   · 10/11/2019. Lexiscan perfusion. Normal perfusion. 66% LVEF   11. Overweight (278.02) (E66.3)   12. Permanent atrial fibrillation (427.31) (I48.21)   Initial diagnosis prior to 2014 paroxysmal at that time. 2017 was far more frequent and      antiarrhythmic therapy was considered but rate controlled preferred by patient. As of      September 2 019: Likely permanent. 13. Pulmonary fibrosis (515) (J84.10)   14. Right carotid bruit (785.9) (R09.89)   · 10/11/2019. Carotid duplex. Less than 50% bilateral stenosis with mild diffuse plaque   15. Former smoker (W16.52) (T73.296)    Family History   1. Family history of hypertension (V17.49) (Z82.49) : Mother   2. Family history of malignant neoplasm (V16.9) (Z80.9) : Brother    Social History   · Daily caffeine consumption, 4-5 servings a day   · Former smoker (V15.82) (H76.880)   · No illicit drug use   · Occasional alcohol consumption (V49.89) (Z78.9)    Surgical History  Denies any surgeries or colonoscopy since last appt. ANoble, LPN     Current Meds   1.  Metoprolol Succinate ER 25 MG Oral Tablet Extended Release 24 Hour; one tablet prescriptions, view laboratory and testing done in   our office, request appointments, view immunization records and message your provider   through our safe and secure patient portal. Ask a staff member how   to register or visit us at [email or web address] to get started today.;   Status:Complete;   Done: 08Oct2020   4. Continue same medications/treatment.; Status:Active; Requested for:[id number];    5. Drink plenty of fluids.; Status:Active; Requested for:47Izl5262;    6. Eat a normal well-balanced diet.; Status:Active; Requested for:41Lpy3238;    7. Please bring all medicines, vitamins, and herbal supplements with you when you come   to the office.; Status:Active; Requested for:09Vqx3203;    8. Please bring any lab results from other providers/physicians to your next appointment.;   Status:Active; Requested for:08Oct2020;    9. Prescriptions will not be filled unless you are compliant with your follow up appointments   or have a follow up appointments scheduled as per the instruction of your physician. Refills for medications should be requested at the time of your office visit.; Status:Active; Requested for:08Oct2020;    10. Thank you for being a patient at [facility]. Our goal is to    provide high quality medical care. Following today's visit, please check your email for an    invitation to complete our patient satisfaction survey. By sharing your feedback, you will    help us continue our mission to provide excellent medical care. Your participation in the    survey is voluntary and completely confidential. We appreciate your thoughts regarding    today's visit.; Status:Active; Requested for:08Oct2020;    11. Tobacco use Hx Reported; Status:Complete;   Done: [id number]    Discussion/Summary  As discussed today, please be sure to drink more fluids and eat regularly.      Signatures  Electronically signed by : Anu Eason LPN; Oct  7 2344  4:49BR EST Electronically signed by : Dian Zambrano LPN; Oct  8 0026  9:59VE EST                        Electronically signed by : Kelli Swann M.D.; Oct  8 2020  6:08PM EST

## 2020-11-25 NOTE — PROGRESS NOTES
crackles  Cardiovascular: Regular rate and rhythm with systolic murmur  Abdomen: Soft, non-tender, non-distended with normal bowel sounds. Joel sign negative  Musculoskeletal: No clubbing, cyanosis or edema bilaterally. Neuro: Non Focal.  Alert, moving all extremities      Labs:   Recent Labs     11/23/20  0624 11/24/20  0637 11/25/20  0612   WBC 7.0 13.5* 12.3*   HGB 13.8 13.4 11.1*   HCT 42.0 40.5 34.6*    189 158     Recent Labs     11/23/20  0624 11/24/20  0637 11/25/20  0612    136 141   K 4.3 5.0* 4.4    100 107   CO2 26 26 22   BUN 15 24* 24*   CREATININE 0.55 1.05* 0.92*   CALCIUM 8.4* 8.7 8.5     Recent Labs     11/23/20  0624 11/24/20  0637 11/25/20  0612   * 285* 79*   ALT 57* 128* 57*   BILITOT 1.5* 4.9* 2.9*   ALKPHOS 126 229* 124     Recent Labs     11/23/20  0624 11/24/20  0637   INR 2.6 3.7     Recent Labs     11/22/20  1422 11/25/20  0612   TROPONINI <0.010 <0.010       Urinalysis:      Lab Results   Component Value Date    NITRU POSITIVE 11/22/2020    WBCUA 6-9 11/22/2020    BACTERIA MANY 11/22/2020    RBCUA 3-5 11/22/2020    BLOODU TRACE 11/22/2020    SPECGRAV 1.020 11/22/2020    GLUCOSEU Negative 11/22/2020       Radiology:  XR CHEST (2 VW)   Final Result   Low inspiratory lung show probable retrocardiac and chronic increase and show markings in both lungs. There are no large infiltrates or consolidations. US ABDOMEN LIMITED Specify organ? LIVER, GALLBLADDER   Final Result      EXTENSIVE CHOLELITHIASIS WITH FINDINGS SUGGESTIVE OF ACUTE CHOLECYSTITIS. MILD PREDOMINANTLY EXTRAHEPATIC BILIARY DILATATION, AND SUSPICION OF CHOLEDOCHOLITHIASIS. FURTHER EVALUATION WITH MRCP IS SUGGESTED; AS CLINICALLY WARRANTED. UNREMARKABLE RIGHT UPPER QUADRANT DOPPLER ULTRASOUND. US DUP ABD PEL RETRO SCROT COMPLETE   Final Result      EXTENSIVE CHOLELITHIASIS WITH FINDINGS SUGGESTIVE OF ACUTE CHOLECYSTITIS.       MILD PREDOMINANTLY EXTRAHEPATIC BILIARY DILATATION, AND SUSPICION OF CHOLEDOCHOLITHIASIS. FURTHER EVALUATION WITH MRCP IS SUGGESTED; AS CLINICALLY WARRANTED. UNREMARKABLE RIGHT UPPER QUADRANT DOPPLER ULTRASOUND. RADIOLOGY REPORT   Final Result      XR CHEST (2 VW)   Final Result   SEVERE DEGENERATIVE CHANGE, BILATERAL SHOULDERS. THORACOLUMBAR SCOLIOSIS. BILATERAL LUNG CHANGES COMPATIBLE WITH CLINICAL HISTORY OF PULMONARY FIBROSIS. NO ACUTE FINDINGS. CT ABDOMEN PELVIS W IV CONTRAST Additional Contrast? None   Final Result      Sigmoid diverticulosis. Fecal impaction with possible rectal stercoral colitis. Hepatic steatosis with focal fatty sparing. Spleen, lower limits of normal in size, unchanged. Small bilateral kidneys. Cholelithiasis versus high density gallbladder sludge. Severe degenerative change lumbar spine with scoliosis. Remote compression fracture, T9. Grade 1, L4, and grade one, L5, spondylolisthesis, unchanged. All CT scans at this facility use dose modulation, iterative reconstruction, and/or weight based dosing when appropriate to reduce radiation dose to as low as reasonably achievable.                        Assessment/Plan:    Active Hospital Problems    Diagnosis Date Noted    Sepsis due to urinary tract infection ([facility] [address] [A41.9, N39.0] 11/24/2020    Pressure injury of skin of contiguous region involving back, buttock, and hip [L89.40] 11/24/2020    Constipation, acute [K59.00] 11/24/2020    LFT elevation [R79.89] 11/24/2020    Hyperkalemia [E87.5] 11/24/2020    Gait abnormality [R26.9] 11/23/2020    DANNY (generalized anxiety disorder) [F41.1] 11/23/2020    GERD (gastroesophageal reflux disease) [K21.9] 11/23/2020    Hyperlipidemia [E78.5] 11/23/2020    Major depressive disorder [F32.9] 11/23/2020    Vitamin D deficiency [E55.9] 11/23/2020    Osteoporosis [M81.0] 11/23/2020    Atrial fibrillation with rapid ventricular response [facility]) [I48.91] 11/22/2020    Recurrent major depressive disorder, in full remission [facility]) [F33.42] 07/03/2018        80year-old female with history of atrial fibrillation Coumadin, asthma who presented with back pain and bilateral lower extremity weakness. Abnormal LFTs  Hypertension 2/2 sepsis  Cholecystitis on US, ? Choledocholithiasis  -Continue Zosyn  -General surgery consult  - MRCP to evaluate choledocholithiasis  -Blood cultures in process  -Bolus IVF as needed  -May need to be moved to ICU if no improvement in BP    Atrial fibrillation s/p cardioversion 11/25/2020  Supratherapeutic INR  -Repeat INR  -May need vitamin K  -No signs of bleeding    Lower extremity weakness  - PT/OT  -May need evaluation of the spine when medically stable    Additional work up or/and treatment plan may be added today or then after based on clinical progression. I am managing a portion of pt care. Some medical issues are handled by other specialists. Additional work up and treatment should be done in out pt setting by pt PCP and other out pt providers. In addition to examining and evaluating pt, I spent additional time explaining care, normal and abnormal findings, and treatment plan. All of pt questions were answered. Counseling, diet and education were  provided. Case will be discussed with nursing staff when appropriate. Family will be updated if and when appropriate.       Diet: Diet NPO Time Specified Exceptions are: Sips with Meds  DIET CLEAR LIQUID;    Code Status: Full Code    Electronically signed by Abeba Goodson MD on 11/25/2020 at 1:33 PM

## 2020-11-25 NOTE — PROGRESS NOTES
Physical Therapy Med Surg Initial Assessment  [facility]  [facility]       NAME: Shawn Hendrix  : 1933 (80 y.o.)  MRN: [id number]  CODE STATUS: Full Code    Date of Service: 2020    Patient Diagnosis(es): Atrial fibrillation with rapid ventricular response [facility]) [I48.91]   Chief Complaint   Patient presents with    Back Pain     week long; shooting up and down left leg    Tailbone Pain     decubitus     Patient Active Problem List    Diagnosis Date Noted    Sepsis due to urinary tract infection ([facility] [address] 2020    Pressure injury of skin of contiguous region involving back, buttock, and hip 2020    Constipation, acute 2020    LFT elevation 2020    Hyperkalemia 2020    Gait abnormality 2020    Aortic stenosis 2020    DANNY (generalized anxiety disorder) 2020    GERD (gastroesophageal reflux disease) 2020    Hyperlipidemia 2020    Major depressive disorder 2020    Osteoporosis 2020    Vitamin D deficiency 2020    Atrial fibrillation with rapid ventricular response ([facility] [address] 2020    Pneumonia 2020    Osteoarthritis, generalized 10/01/2019    Hypoxia 2019    Abrasion of buttock 2018    Recurrent major depressive disorder, in full remission ([facility] [address] 2018        Past Medical History:   Diagnosis Date    Anticoagulated on Coumadin     Arthritis     Asthma     Atrial fibrillation ([facility] [address]     History of blood transfusion     Pneumonia      Past Surgical History:   Procedure Laterality Date    COLONOSCOPY      JOINT REPLACEMENT Bilateral     bilateral femur replacements       Chart Reviewed: Yes  Patient assessed for rehabilitation services?: Yes  Family / Caregiver Present: No  General Comment  Comments: pt declined to transfer into bedside chair; PT/OT co-eval    Restrictions:  Restrictions/Precautions: Fall Risk(high quinn score; supplemental O2 2Ls) SUBJECTIVE: Subjective:  \"Im nervous\"    Pain  Pre Treatment Pain Screening  Pain at present: 5  Intervention List: Patient able to continue with treatment;Nurse/physician notified    Post Treatment Pain Screening:   Pain Assessment  Pain Level: 5  Pain Location: Abdomen    Prior Level of Function:  Social/Functional History  Lives With: Alone  Type of Home: House  Home Layout: One level  Home Access: Stairs to enter with rails  Entrance Stairs - Number of Steps: 4 with 2 rails  Bathroom Shower/Tub: Walk-in shower  Bathroom Equipment: Shower chair, Grab bars in shower  Home Equipment: [employer], 4 wheeled walker  ADL Assistance: [place]: Independent(cleaning lady 2xs/mon; 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)  Ambulation Assistance: Independent(rollator)  Transfer Assistance: Independent  Active : No  Additional Comments: O2 cont at home 2Ls; grocery shopping completed by friend or son in law; dtr takes pt for MD appt    OBJECTIVE:   Vision: Impaired  Vision Exceptions: Wears glasses at all times  Hearing: Within functional limits    Cognition:  Overall Orientation Status: Within Functional Limits  Follows Commands: Within Functional Limits    Observation/Palpation  Posture: Poor(maintains trunk flexion in standing)  Observation: IV running; on supplemental O2 2Ls; pt exhibiting mod to max SOB with transfer edge of bed and standing- recovery during rest in 30 sec    ROM:  RLE AROM: WFL  LLE AROM : WFL    Strength:  Strength RLE  Comment: grossly 4-/5  Strength LLE  Comment: grossly 4-/5    Neuro:  Balance  Posture: Poor(pt maintains increased trunk flexion in sitting with elbows on thighs)  Sitting - Static: Good;-  Sitting - Dynamic: Good;-  Standing - Static: Poor(pt unable to maintain standing balance without B UE support and mod A)     Tone RLE  RLE Tone: Normotonic  Tone LLE  LLE Tone: Normotonic  Motor Control  Gross Motor?: WFL       Bed mobility  Supine to Sit: Moderate assistance;2 Person Devices  Type of devices: Bed alarm in place, Call light within reach, Left in bed    Goals:  Long term goals  Long term goal 1: Pt will demonstrate bed mobility indep  Long term goal 2: Pt will demonstrate transfers mod indep with rollator for return to prior level of function  Long term goal 3: Pt will demonstrate amb 150ft mod indep with rollator to allow pt to amb inside her home  Long term goal 4: Pt will demonstrate stair negotiation 4 steps with 2 rails mod indep to allow pt to enter and exit her home safely. [facility] (6 CLICK) BASIC MOBILITY  AM-PAC Inpatient Mobility Raw Score : 12    Therapy Time:   Individual   Time In 1525   Time Out 1536   Minutes [facility], [address] [facility], 11/25/20 at 4:41 PM         Definitions for assistance levels  Independent = pt does not require any physical supervision or assistance from another person for activity completion. Device may be needed.   Stand by assistance = pt requires verbal cues or instructions from another person, close to but not touching, to perform the activity  Minimal assistance= pt performs 75% or more of the activity; assistance is required to complete the activity  Moderate assistance= pt performs 50% of the activity; assistance is required to complete the activity  Maximal assistance = pt performs 25% of the activity; assistance is required to complete the activity  Dependent = pt requires total physical assistance to accomplish the task

## 2020-11-25 NOTE — FLOWSHEET NOTE
Ian Garcia from lab called and states patient's INR is greater than 10. Dr. Radha Cantu on the floor and notified. Order for a redraw put in by the doctor. Electronically signed by Ephraim Dykes Carry on 11/25/2020 at 10:57 AM    1145- Dr. Radha Cantu on the floor and notified patient's blood pressure is running low. Patient is asymptomatic. 1345-Dr. Radha Cantu notified patient's blood pressure is still low. New orders received. 1400- Lab called with the redraw INR which is still greater than 10. Dr. Radha Cantu notified. Electronically signed by Ephraim Dykes Carry on 11/25/2020 at 2:09 PM

## 2020-11-25 NOTE — PROGRESS NOTES
11/20. 11. Aortic valve stenosis, mild, V-max 1.8 m/s. Same by Echo 11/20. 12. Pulmonary fibrosis   13. Asthma  14. Mild pulmonary hypertension RVSP 43 last.  15. Past smoker  16. Hypothyroidism  17. GERD  18. Depression  19. Overweight  20. Degenerative joint disease, lumbar spine stenosis with neuropathy. 21. Otherwise as prior documentation. Plan:    1. Cardiac Supportive Care  2. Primary care to treat underlying urinary tract infection / cholecystitis and medical needs. 3. ID care with ABX Rx.  4. Albumin IV and IV and PO hydration for hypotension. 5. Post atrial fibrillation [facility] care. 6. IV Amiodarone and Digoxin given. Will probably DC both upon discharge home. 7. Continue Coumadin unless other procedures are warranted, hospital pharmacy to manage. 8. Continue prior home cardiovascular medications with appropriate adjustments for heart rate and blood pressure as needed. 9. Serial telemetry. 10. Serial laboratories including troponins. 11. Further Recommendations to follow  12. See Orders    ++++++++++++++++++++++++++++++++++    HISTORY OF PRESENT ILLNESS:      Murali Weber is a pleasant 80 y.o. female with with the above-noted past cardiac and medical history including permanent atrial fibrillation on chronic Coumadin anticoagulation, mild aortic valve stenosis, preserved left ventricular function, pulmonary fibrosis, asthma who now presents with bilateral lower extremity weakness and back pain. Patient has had progression of her symptomatology over the last several years but more recently over the last month. She feels too weak to get up and do her normal chores. She cannot climb steps now. She lives alone. She came in for evaluation through the emergency room. She was noted to have rapid atrial fibrillation. INR was 3.3. Initial troponin was negative. She was found to have urinary tract infection. She was admitted for further evaluation and possible placement.   She is currently in isolation precautions. Cardiology was asked to see for further relation consultation. Patient Follows with Jaylan Mosley MD [facility]. See his last note attached below. Patient History and Records, EMR reviewed. Patient Interviewed and examined. Fair historian. Denies CP, SOB, LH, Dizziness, TIA or CVA Symptoms. No Orthopnea, Edema or CHF symptoms. No Palpitations. No Syncope. No Fever, Chills or Cold symptoms. No GI or Bleeding complaints. Cardiac and general ROS otherwise negative. [address] otherwise negative other than noted. Past Medical History:   Diagnosis Date    Anticoagulated on Coumadin     Arthritis     Asthma     Atrial fibrillation ([facility] [address]     History of blood transfusion     Pneumonia        Past Surgical History:   Procedure Laterality Date    COLONOSCOPY      JOINT REPLACEMENT Bilateral     bilateral femur replacements       Prior to Admission medications    Medication Sig Start Date End Date Taking? Authorizing Provider   traMADol (ULTRAM) 50 MG tablet Take 50 mg by mouth every 8 hours as needed for Pain.    Yes Historical Provider, MD   metoprolol tartrate (LOPRESSOR) 50 MG tablet Take 75 mg by mouth daily   Yes Historical Provider, MD   fluticasone-salmeterol (ADVAIR DISKUS) 250-50 MCG/DOSE AEPB Inhale 1 puff into the lungs 12/28/17  Yes Historical Provider, MD   warfarin (COUMADIN) 5 MG tablet Take 5 mg by mouth 7/27/17  Yes Historical Provider, MD       Scheduled Meds:   sodium chloride flush  10 mL Intravenous 2 times per day    albumin human  50 g Intravenous BID    digoxin  125 mcg Oral BID    amiodarone  400 mg Oral BID    lactulose  30 g Oral BID    docusate sodium  100 mg Oral Daily    piperacillin-tazobactam  3.375 g Intravenous Q8H    sodium chloride flush  10 mL Intravenous 2 times per day    warfarin (COUMADIN) daily dosing (placeholder)   Other RX Placeholder    metoprolol tartrate  25 mg Oral 4x Daily     Continuous Infusions:   sodium chloride      sodium chloride 75 mL/hr at 11/25/20 0404     PRN Meds:sodium chloride flush, magnesium hydroxide, bisacodyl, sodium chloride flush, acetaminophen **OR** acetaminophen, polyethylene glycol, promethazine **OR** ondansetron, metoprolol    Allergies   Allergen Reactions    Carbamazepine And Analogs     Codeine        Social History     Socioeconomic History    Marital status:      Spouse name: Not on file    Number of children: Not on file    Years of education: Not on file    Highest education level: Not on file   Occupational History    Not on file   Social Needs    Financial resource strain: Not on file    Food insecurity     Worry: Not on file     Inability: Not on file    Transportation needs     Medical: Not on file     Non-medical: Not on file   Tobacco Use    Smoking status: Former Smoker     Packs/day: 0.25     Types: Cigarettes    Smokeless tobacco: Never Used    Tobacco comment: Quit smoking at 18yrs of age   Substance and Sexual Activity    Alcohol use: Not Currently     Comment: occasional    Drug use: Not Currently    Sexual activity: Not Currently     Partners: Male   Lifestyle    Physical activity     Days per week: Not on file     Minutes per session: Not on file    Stress: Not on file   Relationships    Social connections     Talks on phone: More than three times a week     Gets together: More than three times a week     Attends Sabianist service: Never     Active member of club or organization: No     Attends meetings of clubs or organizations: Never     Relationship status:      Intimate partner violence     Fear of current or ex partner: No     Emotionally abused: No     Physically abused: No     Forced sexual activity: No   Other Topics Concern    Not on file   Social History Narrative     [address] History                                   Family History   Problem Relation Age of Onset    High Blood Pressure Mother     Osteoporosis Mother     Other Brother        Review Of Systems:    14 point ROS negative other than mentioned. Physical Exam:    CURRENT VITALS: BP (!) 79/55   Pulse 101   Temp 97.9 °F (36.6 °C) (Oral)   Resp 22   Ht 5' 4\" (1.626 m)   Wt 129 lb 12.8 oz (58.9 kg)   LMP  (LMP Unknown)   SpO2 100%   BMI 22.28 kg/m²     Per Staff and Notes:  CONSTITUTIONAL:  awake, alert, cooperative, no apparent distress,   ENT:  Normocephalic, without obvious abnormality, atraumatic, sinuses nontender on palpation, external ears without lesions,  NECK:  Supple, symmetrical, trachea midline, no adenopathy, thyroid symmetric, not enlarged and no tenderness, skin normal, No bruits. LUNGS:  No increased work of breathing, good air exchange, clear to auscultation bilaterally, no crackles, no wheezing  CARDIOVASCULAR:  Normal apical impulse, irregular rate and rhythm, normal S1 and S2,  2/6 Systolic murmur noted. ABDOMEN:  Obese, normal bowel sounds, soft, non-distended, mildly tender, no masses palpated, no hepatosplenomegally  EXTREMETIES: No edema, Pulses Strong Thruout. No ulcers. NEUROLOGIC:  Awake, alert, oriented to name, place and time. Following all commands and moving all extremties.   SKIN:  no bruising or bleeding, normal skin color, texture, turgor and no rashes    Labs:  Recent Results (from the past 24 hour(s))   EKG 12 Lead    Collection Time: 11/25/20 12:30 AM   Result Value Ref Range    Ventricular Rate 118 BPM    Atrial Rate 234 BPM    QRS Duration 80 ms    Q-T Interval 294 ms    QTc Calculation (Bazett) 412 ms    R Axis -17 degrees    T Axis 195 degrees   CBC Auto Differential    Collection Time: 11/25/20  6:12 AM   Result Value Ref Range    WBC 12.3 (H) 4.8 - 10.8 K/uL    RBC 3.60 (L) 4.20 - 5.40 M/uL    Hemoglobin 11.1 (L) 12.0 - 16.0 g/dL    Hematocrit 34.6 (L) 37.0 - 47.0 %    MCV 96.3 82.0 - 100.0 fL    MCH 30.9 27.0 - 31.3 pg    MCHC 32.1 (L) 33.0 - 37.0 %    RDW 15.2 (H) 11.5 - 14.5 %    Platelets 574 759 - 594 K/uL    Neutrophils % 91.4 %    Lymphocytes % 4.7 %    Monocytes % 3.7 %    Eosinophils % 0.0 %    Basophils % 0.2 %    Neutrophils Absolute 11.3 (H) 1.4 - 6.5 K/uL    Lymphocytes Absolute 0.6 (L) 1.0 - 4.8 K/uL    Monocytes Absolute 0.5 0.2 - 0.8 K/uL    Eosinophils Absolute 0.0 0.0 - 0.7 K/uL    Basophils Absolute 0.0 0.0 - 0.2 K/uL   Comprehensive Metabolic Panel    Collection Time: 11/25/20  6:12 AM   Result Value Ref Range    Sodium 141 135 - 144 mEq/L    Potassium 4.4 3.4 - 4.9 mEq/L    Chloride 107 95 - 107 mEq/L    CO2 22 20 - 31 mEq/L    Anion Gap 12 9 - 15 mEq/L    Glucose 92 70 - 99 mg/dL    BUN 24 (H) 8 - 23 mg/dL    CREATININE 0.92 (H) 0.50 - 0.90 mg/dL    GFR Non-African American 57.7 (L) >60    GFR  >60.0 >60    Calcium 8.5 8.5 - 9.9 mg/dL    Total Protein 6.2 (L) 6.3 - 8.0 g/dL    Alb 3.7 3.5 - 4.6 g/dL    Total Bilirubin 2.9 (H) 0.2 - 0.7 mg/dL    Alkaline Phosphatase 124 40 - 130 U/L    ALT 57 (H) 0 - 33 U/L    AST 79 (H) 0 - 35 U/L    Globulin 2.5 2.3 - 3.5 g/dL   Magnesium    Collection Time: 11/25/20  6:12 AM   Result Value Ref Range    Magnesium 2.1 1.7 - 2.4 mg/dL   Sedimentation Rate    Collection Time: 11/25/20  6:12 AM   Result Value Ref Range    Sed Rate 17 0 - 30 mm   Troponin    Collection Time: 11/25/20  6:12 AM   Result Value Ref Range    Troponin <0.010 0.000 - 0.010 ng/mL       ECG:     SR, with APCs / VPCs, 70s. TELEMETRY:  Afib / Flutter , 105 range, now post [facility] in SR 70s. ECHO:     1. Abnormal echocardiogram.    2. Normal left ventricular systolic function, LVEF 65 %.    3. Moderate Aortic valve stenosis with estimated aortic valve area 1.1    cm2.    4. Moderate AI, MR.    5. Moderate Left ventricular hypertrophy.    6. Moderate Bi atrial enlargement.    7. Normal pericardium.          Zenaida Dill MD  [facility] Cardiologist      Electronically signed on 11/22/20 at 9:16 AM EST      -----    Dr Rhett Hollingsworth Note:    Subjective  PCP: Yanelis   Subjective: Mrs. Kita Yanez follows here for mild-moderate aortic stenosis and permanent atrial fibrillation. She is on chronic anticoagulation with Coumadin managed by at [facility]. Last echocardiogram just 1 year ago with 65% ejection fraction mild aortic stenosis with mean gradient 9 mmHg peak velocity 1.8 m/s RVSP 43. Stewart Esposito When last seen in March she had been doing quite well though does lead a fairly sedentary lifestyle. Lab work 6/30/2020 CBC normal INR that particular day 3.5 CMP remarkable only for albumin mildly decreased 3.3 glucose 102 creatinine normal 0.91 electrolytes     She denies chest pain, chest pressure, dyspnea, palpitations, lightheadedness, syncope, pnd and orthopnea. She has not recently been hospitalized or seen in an ER. Is mention that perhaps once a month she will have slight lightheadedness if she gets up too fast particularly if she has been sitting on the toilet. She does say however that she does not always maintain adequate hydration and does not always eat regularly. She has had no syncope. She has had no bleeding on anticoagulation and she has no sensation of her atrial fibrillation    Impression  1. Permanent atrial fibrillation: Rate is well controlled at rest. She has no symptoms of CHF to suggest excess rates with activity. She is had no bleeding on anticoagulation this is followed closely at [facility] continue same. Although blood pressure is on the low side usually she says it is 110-120. I suspect some of the relatively low pressures are related to inadequate p.o. intake. Could consider decreasing metoprolol but she said she felt much better when I increased it from 50-75 because of better control of heart rate. For now continue current medical regimen    2. Stenosis: Mild stenosis at echo 1 year ago the likelihood of progression to significant disease at her age is extraordinarily low and probably 0    3.  Dizziness: Almost certainly due to volume depletion given that it only occurs once a month and she does describe relatively frequently actually poor p.o. intake and somewhat erratic eating habits. Courage to maintain hydration      Orders  Return visit 6 months   Urged her to maintain better hydration and to be sure she eats regularly    ROS  No mental status changes. Sometimes fatigue  No fever, chills or sweats. No hematuria. No abdominal pain, hematochezia, or hematemesis. No arthritic complaints. No focal weakness of limbs. No epistaxis. No cold intolerance. No claudication  No visual complaints    Physical exam  Blood pressure 100/80. Heart rate 70. Respiratory rate 12. She appears quite comfortable she is not diaphoretic. There is no elevation jugular venous distention. She has no abdominal pain. There is no edema    Over 50% of patient evaluation involved discussion of diagnosis, pathophysiology, treatment and care coordination. Chief Complaint  6 month follow up   Chief Complaint Free Text:   An interactive audio and video telecommunication system which permitted real time communication was used with the patient consent to complete today's visit. Active Problems   1. Anxiety disorder (300.00) (F41.9)   2. Aortic stenosis (424.1) (I35.0)   · 10/11/2019 echo. LVEF 65%. Moderate LVH. Biatrial enlargement. Moderate aortic      stenosis peak/mean gradients 13/9 mmHg. Peak velocity 1.8 m/s. RVSP 43 mmHg   MAY 2017 echocardiogram: LVEF 66%. Biatrial enlargement. Mild RV dilatation. Aortic      valve area 1.6 cm². Peak/mean gradient 14/7 mmHg. RVSP 31 mm   3. Asthma (493.90) (J45.909)   4. Chronic GERD (530.81) (K21.9)   5. Depression (311) (F32.9)   6. Dizziness (780.4) (R42)   7. High risk medication use (V58.69) (Z79.899)   8. Hyperlipidemia (272.4) (E78.5)   9. Hypothyroidism (244.9) (E03.9)   10. Normal cardiac stress test   · 10/11/2019. Lexiscan perfusion. Normal perfusion. 66% LVEF   11.  Overweight (278.02) (E66.3)   12. Permanent atrial fibrillation (427.31) (I48.21)   Initial diagnosis prior to 2014 paroxysmal at that time. 2017 was far more frequent and      antiarrhythmic therapy was considered but rate controlled preferred by patient. As of      September 2 019: Likely permanent. 13. Pulmonary fibrosis (515) (J84.10)   14. Right carotid bruit (785.9) (R09.89)   · 10/11/2019. Carotid duplex. Less than 50% bilateral stenosis with mild diffuse plaque   15. Former smoker (O64.75) (S10.677)    Family History   1. Family history of hypertension (V17.49) (Z82.49) : Mother   2. Family history of malignant neoplasm (V16.9) (Z80.9) : Brother    Social History   · Daily caffeine consumption, 4-5 servings a day   · Former smoker (V15.82) (B14.283)   · No illicit drug use   · Occasional alcohol consumption (V49.89) (Z78.9)    Surgical History  Denies any surgeries or colonoscopy since last appt. ANoble, LPN     Current Meds   1. Metoprolol Succinate ER 25 MG Oral Tablet Extended Release 24 Hour; one tablet at   bedtime in addition to 50mg=75mg; Therapy: 16XMN4321 to (Evaluate:27Skb3302)  Requested for: 05GAK2821; Last   Rx:[id number] Ordered   2. Metoprolol Succinate ER 50 MG Oral Tablet Extended Release 24 Hour; 1 tablet at   bedtime in addition to 25mg=75mg; Therapy: 61ZQC9570 to (Evaluate:77Khb3958)  Requested for: 75Clh3484; Last   Rx:[id number] Ordered   3. traMADol HCl - 50 MG Oral Tablet; TAKE 1 TABLET 3 TIMES DAILY AS NEEDED; Therapy: 85KWJ5061 to Recorded   4. Vitamin D3 50 MCG (2000 UT) Oral Tablet; take 1 tablet daily; Therapy: (Sabrina Briseno) to Recorded   5. Warfarin Sodium 2 MG Oral Tablet; take 1 tabelt daily as directed. managed by pcp office; Therapy: (Recorded:27Hjm1795) to Recorded   6. Warfarin Sodium 2 MG Oral Tablet; TAKE 1 TABLET DAILY; Therapy: 30SQN7493 to Recorded   7.  Julian Du 250-50 MCG/DOSE Inhalation Aerosol Powder Breath Activated; INHALE 1   PUFF TWICE DAILY; Therapy: 19Iyr4259 to Recorded    Meds reviewed with daughter with list on her phone, via phone call. ANoble, LPN     Allergies   1. codeine   Hallucinations;; Updated By: Chris Powell; 10/22/2019 1:25:31 PM   2. TEGretol TABS   also had abnormal blood levels; Rash; Recorded By: Chris Powell; 10/22/2019 1:25:32 PM    Immunizations  PCV --- Phyliss Spell: 01-Oct-2019   PPSV --- Phyliss Spell: 01-Jan-2011     Pt does not get flu vaccines. ANoble, LPN     Vitals  Daughter says mother does not check bp's. Advised to check if she has a monitor available prior to tomorrow appt. ANoble, LPN     Assessment   1. Permanent atrial fibrillation (427.31) (I48.21)   2. Aortic stenosis (424.1) (I35.0)   3. Dizziness (780.4) (R42)    Plan   1. 6 month follow-up/call if interval problems. Evaluation and Treatment  Follow-up  Status:   Active  Requested for: 37EIO4141   2. Follow up per family physician. Evaluation and Treatment  Follow-up  Status: Active    Requested for: 05JXN1588   6. Access your medical record, request prescriptions, view laboratory and testing done in   our office, request appointments, view immunization records and message your provider   through our safe and secure patient portal. Ask a staff member how   to register or visit us at [email or web address] to get started today.;   Status:Complete;   Done: 08Oct2020   4. Continue same medications/treatment.; Status:Active; Requested for:19Obe7985;    5. Drink plenty of fluids.; Status:Active; Requested for:43Npn8460;    6. Eat a normal well-balanced diet.; Status:Active; Requested for:68Zyf4741;    7. Please bring all medicines, vitamins, and herbal supplements with you when you come   to the office.; Status:Active; Requested for:99Lox4451;    8. Please bring any lab results from other providers/physicians to your next appointment.;   Status:Active; Requested for:48Dsi9276;    9.  Prescriptions will not be filled unless you are compliant with your follow up appointments   or have a follow up appointments scheduled as per the instruction of your physician. Refills for medications should be requested at the time of your office visit.; Status:Active; Requested for:68Psr9512;    10. Thank you for being a patient at [facility]. Our goal is to    provide high quality medical care. Following today's visit, please check your email for an    invitation to complete our patient satisfaction survey. By sharing your feedback, you will    help us continue our mission to provide excellent medical care. Your participation in the    survey is voluntary and completely confidential. We appreciate your thoughts regarding    today's visit.; Status:Active; Requested for:42Cef5635;    11. Tobacco use Hx Reported; Status:Complete;   Done: 46WAE6081    Discussion/Summary  As discussed today, please be sure to drink more fluids and eat regularly.      Signatures  Electronically signed by : Rachel Higginbotham LPN; Oct  7 7454  3:76ZF EST                        Electronically signed by : Horacio Oseguera LPN; Oct  8 1856  6:09PN EST                        Electronically signed by : Elly Daniel M.D.; Oct  8 2020  6:08PM EST

## 2020-11-25 NOTE — PROGRESS NOTES
tendon reflexes or     sensation  Lungs:  Diminished, CTA-B. Respiration effort is normal at rest.     Heart:   S1 = S2,   Afib w RVR at  times. No loud murmurs. Abdomen:  Soft,  RUQ-tender, no enlargement of liver or spleen. Extremities:  No significant lower extremity edema or tenderness.   Skin:    BUE bruises dt blood draws,  3  Sacral decubiti    Rehabilitation:    tx held dt low BP    Physical therapy: FIMS:  Bed Mobility:      Transfers:  ,  ,      FIMS:  ,  ,      Occupational therapy: FIMS:   ,  ,      Speech therapy: FIMS:        Lab/X-ray studies reviewed, analyzed and discussed with patient and staff:   Recent Results (from the past 24 hour(s))   EKG 12 Lead    Collection Time: 11/25/20 12:30 AM   Result Value Ref Range    Ventricular Rate 118 BPM    Atrial Rate 234 BPM    QRS Duration 80 ms    Q-T Interval 294 ms    QTc Calculation (Bazett) 412 ms    R Axis -17 degrees    T Axis 195 degrees   CBC Auto Differential    Collection Time: 11/25/20  6:12 AM   Result Value Ref Range    WBC 12.3 (H) 4.8 - 10.8 K/uL    RBC 3.60 (L) 4.20 - 5.40 M/uL    Hemoglobin 11.1 (L) 12.0 - 16.0 g/dL    Hematocrit 34.6 (L) 37.0 - 47.0 %    MCV 96.3 82.0 - 100.0 fL    MCH 30.9 27.0 - 31.3 pg    MCHC 32.1 (L) 33.0 - 37.0 %    RDW 15.2 (H) 11.5 - 14.5 %    Platelets 747 435 - 080 K/uL    Neutrophils % 91.4 %    Lymphocytes % 4.7 %    Monocytes % 3.7 %    Eosinophils % 0.0 %    Basophils % 0.2 %    Neutrophils Absolute 11.3 (H) 1.4 - 6.5 K/uL    Lymphocytes Absolute 0.6 (L) 1.0 - 4.8 K/uL    Monocytes Absolute 0.5 0.2 - 0.8 K/uL    Eosinophils Absolute 0.0 0.0 - 0.7 K/uL    Basophils Absolute 0.0 0.0 - 0.2 K/uL   Comprehensive Metabolic Panel    Collection Time: 11/25/20  6:12 AM   Result Value Ref Range    Sodium 141 135 - 144 mEq/L    Potassium 4.4 3.4 - 4.9 mEq/L    Chloride 107 95 - 107 mEq/L    CO2 22 20 - 31 mEq/L    Anion Gap 12 9 - 15 mEq/L    Glucose 92 70 - 99 mg/dL    BUN 24 (H) 8 - 23 mg/dL    CREATININE 0.92 (H) low back with multiple compression fractures and generalized OA pain: reassess pain every shift and prior to and after each therapy session, give prn  Tylenol, modalities prn in therapy, consider Lidoderm, K-pad prn.   4. Skin breakdown sacrum: Strict side-to-side turns consider plexus mattress continue pressure relief program.  Daily skin exams and reports from nursing. Plexus mattress. 5. Severe fatigue due to immobility and nutritional deficits: Add vitamin B12 vitamin D and CoQ10 titrate dosing and add protein supplementation with low carb content. Patient getting IV albumin. 6. Complex discharge planning:   Await medical stability-GI testing and BP correction--discussed at length with medical and cardio. Complex Active General Medical Issues that complicate care Assess & Plan:     1. Active Problems:    Atrial fibrillation with rapid ventricular response (HCC)    Gait abnormality    DANNY (generalized anxiety disorder)    GERD (gastroesophageal reflux disease)    Hyperlipidemia    Major depressive disorder    Osteoporosis    Recurrent major depressive disorder, in full remission ([facility] [address]    Vitamin D deficiency    Sepsis due to urinary tract infection ([facility] [address]    Pressure injury of skin of contiguous region involving back, buttock, and hip    Constipation, acute    LFT elevation    Hyperkalemia  Resolved Problems:    * No resolved hospital problems.  Karo Montenegro D.O., PM&R     Attending    [address]

## 2020-11-25 NOTE — PROGRESS NOTES
Infectious Diseases Inpatient Progress Note          HISTORY OF PRESENT ILLNESS:  Follow up sepsis with acute cholecystitis on IV Zosyn. Highly elevated INR is currently getting corrected. Status post cardioversion. For MRCP today. Getting evaluated by surgery and GI, currently on liquid diet. Has borderline low urine output. Mild diffuse abdominal pain. Weak all over. Positive bowel movements. Has leukocytosis and elevated liver function test with hyperbilirubinemia. Denies any cough or shortness of breath. Highly elevated CRP of 203.9  Improving liver function tests and renal function  Decreasing leukocytosis  Current Medications:     sodium chloride flush  10 mL Intravenous 2 times per day    metoprolol tartrate  12.5 mg Oral 4x Daily    albumin human  50 g Intravenous BID    digoxin  125 mcg Oral BID    amiodarone  400 mg Oral BID    lactulose  30 g Oral BID    docusate sodium  100 mg Oral Daily    piperacillin-tazobactam  3.375 g Intravenous Q8H    sodium chloride flush  10 mL Intravenous 2 times per day    warfarin (COUMADIN) daily dosing (placeholder)   Other RX Placeholder       Allergies:  Carbamazepine and analogs and Codeine      Review of Systems  14 system review is negative other than HPI    Physical Exam  Vitals:    11/25/20 1229 11/25/20 1300 11/25/20 1450 11/25/20 1655   BP: (!) 80/51 (!) 78/58 (!) 88/48 99/69   Pulse:   88 92   Resp:   18    Temp:   97.8 °F (36.6 °C)    TempSrc:   Oral    SpO2:   100%    Weight:       Height:         General Appearance: alert and oriented to person, place and time, well-developed and well-nourished, in no acute distress, 2 L nasal cannula  Weak looking  Skin: warm and dry, no rash. Head: normocephalic and atraumatic  Eyes: anicteric sclerae  ENT: oropharynx clear and moist with normal mucous membranes.  No oral thrush  Lungs: normal respiratory effort, diminished breath sounds with bilateral rales  Heart regular heartbeat, positive systolic Vitamin D deficiency    Sepsis due to urinary tract infection (HCC)    Pressure injury of skin of contiguous region involving back, buttock, and hip    Constipation, acute    LFT elevation    Hyperkalemia       PLAN:  Continue IV Zosyn   Check MRCP  Follow-up with surgery and GI  Follow-up CBC CMP    Discussed with patient and RN    Ana Landaverde MD

## 2020-11-25 NOTE — CONSULTS
Department of General Surgery - Adult  Surgical Service general surgery  Attending Consult Note      Reason for Consult: Acute cholecystitis      CHIEF COMPLAINT: Right upper quadrant pain    History Obtained From:  patient, electronic medical record    HISTORY OF PRESENT ILLNESS:                The patient is a 80 y.o. female who presents with weakness and lethargy. She was admitted to the hospital on elevated liver function test prompted an ultrasound of her abdomen. She has extensive cholelithiasis along with findings suggestive of acute cholecystitis. There is also findings suggestive of choledocholithiasis. Her liver function test have been improving. She does feel better. She has had no abdominal operations according to her but has had back operations in the past.    Her INR is greater than 10 currently from a history of Coumadin use.     Past Medical History:        Diagnosis Date    Anticoagulated on Coumadin     Arthritis     Asthma     Atrial fibrillation (HCC)     History of blood transfusion     Pneumonia      Past Surgical History:        Procedure Laterality Date    COLONOSCOPY      JOINT REPLACEMENT Bilateral     bilateral femur replacements     Current Medications:   Current Facility-Administered Medications: 0.9 % sodium chloride infusion, , Intravenous, Continuous  sodium chloride flush 0.9 % injection 10 mL, 10 mL, Intravenous, 2 times per day  sodium chloride flush 0.9 % injection 10 mL, 10 mL, Intravenous, PRN  metoprolol tartrate (LOPRESSOR) tablet 12.5 mg, 12.5 mg, Oral, 4x Daily  0.9 % sodium chloride bolus, 500 mL, Intravenous, Once  albumin human 25 % IV solution 50 g, 50 g, Intravenous, BID  digoxin (LANOXIN) tablet 125 mcg, 125 mcg, Oral, BID  amiodarone (CORDARONE) tablet 400 mg, 400 mg, Oral, BID  lactulose (CHRONULAC) 10 GM/15ML solution 30 g, 30 g, Oral, BID  magnesium hydroxide (MILK OF MAGNESIA) 400 MG/5ML suspension 15 mL, 15 mL, Oral, Daily PRN  docusate sodium (COLACE) capsule 100 mg, 100 mg, Oral, Daily  bisacodyl (DULCOLAX) suppository 10 mg, 10 mg, Rectal, Daily PRN  piperacillin-tazobactam (ZOSYN) 3.375 g in dextrose 5 % 50 mL IVPB extended infusion (mini-bag), 3.375 g, Intravenous, Q8H  sodium chloride flush 0.9 % injection 10 mL, 10 mL, Intravenous, 2 times per day  sodium chloride flush 0.9 % injection 10 mL, 10 mL, Intravenous, PRN  acetaminophen (TYLENOL) tablet 650 mg, 650 mg, Oral, Q6H PRN **OR** acetaminophen (TYLENOL) suppository 650 mg, 650 mg, Rectal, Q6H PRN  polyethylene glycol (GLYCOLAX) packet 17 g, 17 g, Oral, Daily PRN  promethazine (PHENERGAN) tablet 12.5 mg, 12.5 mg, Oral, Q6H PRN **OR** ondansetron (ZOFRAN) injection 4 mg, 4 mg, Intravenous, Q6H PRN  metoprolol (LOPRESSOR) injection 5 mg, 5 mg, Intravenous, Q6H PRN  warfarin (COUMADIN) daily dosing (placeholder), , Other, RX Placeholder  Allergies:  Carbamazepine and analogs and Codeine    Social History:   TOBACCO:   reports that she has quit smoking. Her smoking use included cigarettes. She smoked 0.25 packs per day. She has never used smokeless tobacco.  ETOH:   reports previous alcohol use. DRUGS:   reports previous drug use.   Family History:       Problem Relation Age of Onset    High Blood Pressure Mother     Osteoporosis Mother     Other Brother        REVIEW OF SYSTEMS:    CONSTITUTIONAL:  positive for  malaise  EYES:  negative  HEENT:  negative  RESPIRATORY:  negative  CARDIOVASCULAR:  negative  GASTROINTESTINAL:  positive for nausea and abdominal pain  HEMATOLOGIC/LYMPHATIC:  negative  MUSCULOSKELETAL:  negative  NEUROLOGICAL:  negative  BEHAVIOR/PSYCH:  negative    PHYSICAL EXAM:    VITALS:  BP (!) 88/48   Pulse 88   Temp 97.8 °F (36.6 °C) (Oral)   Resp 18   Ht 5' 4\" (1.626 m)   Wt 129 lb 12.8 oz (58.9 kg)   LMP  (LMP Unknown)   SpO2 100%   BMI 22.28 kg/m²   CONSTITUTIONAL:  awake, alert, cooperative, no apparent distress, and appears stated age  EYES:  Lids and lashes normal, pupils equal, round and reactive to light, extra ocular muscles intact, sclera clear, conjunctiva normal  ENT:  Normocephalic, without obvious abnormality, atraumatic, sinuses nontender on palpation, external ears without lesions, oral pharynx with moist mucus membranes, tonsils without erythema or exudates, gums normal and good dentition. NECK:  Supple, symmetrical, trachea midline, no adenopathy, thyroid symmetric, not enlarged and no tenderness, skin normal  HEMATOLOGIC/LYMPHATICS:  no cervical lymphadenopathy  BACK:  Symmetric, no curvature, spinous processes are non-tender on palpation, paraspinous muscles are non-tender on palpation, no costal vertebral tenderness  LUNGS:  No increased work of breathing, good air exchange, clear to auscultation bilaterally, no crackles or wheezing  CARDIOVASCULAR: Normal rate  ABDOMEN: Right upper quadrant tenderness on moderate palpation. No abdominal wall hernias. MUSCULOSKELETAL:  There is no redness, warmth, or swelling of the joints. Full range of motion noted. Motor strength is 5 out of 5 all extremities bilaterally.   Tone is normal.  NEUROLOGIC: Awake alert and oriented  SKIN: Mild scleral icterus  DATA:    CBC:   Lab Results   Component Value Date    WBC 12.3 11/25/2020    RBC 3.60 11/25/2020    HGB 11.1 11/25/2020    HCT 34.6 11/25/2020    MCV 96.3 11/25/2020    MCH 30.9 11/25/2020    MCHC 32.1 11/25/2020    RDW 15.2 11/25/2020     11/25/2020    MPV 7.6 05/25/2014     CMP:    Lab Results   Component Value Date     11/25/2020    K 4.4 11/25/2020    K 4.6 01/13/2020     11/25/2020    CO2 22 11/25/2020    BUN 24 11/25/2020    CREATININE 0.92 11/25/2020    GFRAA >60.0 11/25/2020    LABGLOM 57.7 11/25/2020    GLUCOSE 92 11/25/2020    PROT 6.2 11/25/2020    LABALBU 3.7 11/25/2020    CALCIUM 8.5 11/25/2020    BILITOT 2.9 11/25/2020    ALKPHOS 124 11/25/2020    AST 79 11/25/2020    ALT 57 11/25/2020     Radiology Review: Ultrasound of the abdomen as outlined above    IMPRESSION/RECOMMENDATIONS:      Acute cholecystitis with questionable choledocholithiasis. Liver function tests are trending downward. She is on Zosyn which is appropriate. Discussed with attending and decided on MRCP for evaluation prior to deciding whether ERCP necessary preoperatively. Patient requires time for INR to trend downward as well before any type of intervention can be performed. I talked with Jose Calzada regarding these plans. She understands and agrees. She can currently have a diet.

## 2020-11-25 NOTE — PROGRESS NOTES
Pt awake, follows commands, denies chest pain or shortness of breath. BP remains low. Dr. Paola Arenas notified. IV albumin infusion ordered, awaiting from pharmacy.

## 2020-11-25 NOTE — FLOWSHEET NOTE
Patient BP were lower in the beginning of the shift and were in the 90's SBP by midnight. Patient had c/o N,V and SOB, she was given zofran (see MAR) and O2 increased to 3L for comfort as her saturation levels were 99%. She stated that she felt better with the increase in O2. Patient was made aware that she would be NPO after midnight and consent was signed and placed in chart. Patient had a large bowel movement through the night.      Electronically signed by Anibal Don RN on 11/25/2020 at 5:17 AM

## 2020-11-25 NOTE — PROGRESS NOTES
Clinical Pharmacy Note    Warfarin consult follow-up    Recent Labs     11/25/20  1222   INR >10.0*     Recent Labs     11/23/20  0624 11/24/20  0637 11/25/20  0612   HGB 13.8 13.4 11.1*   HCT 42.0 40.5 34.6*    189 158       Significant drug:drug interactions:  New warfarin drug-drug interactions: Vitamin K to decrease INR    Discontinued drug-drug interactions: Bactrim, amiodarone     Diet  Recent diet/intake: NPO/clears     Notes:  Date INR Warfarin Dose    11/22/20  3.3  HOLD    11/23/20  2.6   2 mg    11/24/20  3.7  HOLD    11/25/20  > 10  HOLD  Vitamin K 1mg SQ X 1 dose                               INR of >10 is supra-therapeutic for goal range of 2-3 for atrial fibrillation. Patient has not been eating as much, was on Bactrim PO BID (can increase INR) and just started amiodarone therapy (can increase INR over time). Will HOLD warfarin until INR back in therapeutic range. Patient Vitamin K 1mg SQ x 1 today. Continue with jimmy PT/INR during pharmacy consult to monitor and dose warfarin therapy.       Woody Leiva, PharmD   11/25/2020 2:46 PM

## 2020-11-25 NOTE — PROGRESS NOTES
[facility] OCC[facility]Y EVALUATION - ACUTE     NAME: Cora Pearson  : 1933 (80 y.o.)  MRN: [id number]  CODE STATUS: Full Code  [facility]    Date of Service: 2020    Patient Diagnosis(es): Atrial fibrillation with rapid ventricular response [facility]) [I48.91]   Chief Complaint   Patient presents with    Back Pain     week long; shooting up and down left leg    Tailbone Pain     decubitus     Patient Active Problem List    Diagnosis Date Noted    Sepsis due to urinary tract infection ([facility] [address] 2020    Pressure injury of skin of contiguous region involving back, buttock, and hip 2020    Constipation, acute 2020    LFT elevation 2020    Hyperkalemia 2020    Gait abnormality 2020    Aortic stenosis 2020    DANNY (generalized anxiety disorder) 2020    GERD (gastroesophageal reflux disease) 2020    Hyperlipidemia 2020    Major depressive disorder 2020    Osteoporosis 2020    Vitamin D deficiency 2020    Atrial fibrillation with rapid ventricular response ([facility] [address] 2020    Pneumonia 2020    Osteoarthritis, generalized 10/01/2019    Hypoxia 2019    Abrasion of buttock 2018    Recurrent major depressive disorder, in full remission ([facility] [address] 2018        Past Medical History:   Diagnosis Date    Anticoagulated on Coumadin     Arthritis     Asthma     Atrial fibrillation ([facility] [address]     History of blood transfusion     Pneumonia      Past Surgical History:   Procedure Laterality Date    COLONOSCOPY      JOINT REPLACEMENT Bilateral     bilateral femur replacements        Restrictions  Restrictions/Precautions: Fall Risk(High per [employer])     Safety Devices: Safety Devices  Safety Devices in place: Yes  Type of devices:  All fall risk precautions in place        Subjective  Pre Treatment Pain Screening  Pain at present: 5  Scale Used: Numeric Score  Intervention List: Patient able to Strength  Gross RUE Strength: Exceptions to [facility]  R Hand General: 3/5  RUE Strength Comment: 3/5 all planes    Coordination, Tone, Quality of Movement: Tone RUE  RUE Tone: Normotonic  Tone LUE  LUE Tone: Normotonic  Coordination  Movements Are Fluid And Coordinated: No  Coordination and Movement description: Fine motor impairments, Decreased speed, Decreased accuracy, Right UE, Left UE  Quality of Movement Other  Comment: Arthritic changes in hands    Hand Dominance:  Hand Dominance  Hand Dominance: Right    ADL Status:  ADL  Feeding: Independent  Grooming: Supervision  UE Bathing: Contact guard assistance  LE Bathing: Moderate assistance  UE Dressing: Contact guard assistance  LE Dressing: Moderate assistance  Toileting: Maximum assistance  Additional Comments: Simulated ADLs as above. Decreased endurance and balance  Toilet Transfers  Toilet Transfer: Unable to assess  Toilet Transfers Comments: Anticipate mod A       Therapy key for assistance levels -   Independent = Pt. is able to perform task with no assistance but may require a device   Stand by assistance = Pt. does not perform task at an independent level but does not need physical assistance, requires verbal cues  Minimal, Moderate, Maximal Assistance = Pt. requires physical assistance (25%, 50%, 75% assist from helper) for task but is able to actively participate in task   Dependent = Pt. requires total assistance with task and is not able to actively participate with task completion     Functional Mobility:  Functional Mobility  Functional Mobility Comments: Unable to take any steps this date  Transfers  Sit to stand: Moderate assistance, 2 Person assistance  Stand to sit: Moderate assistance, 2 Person assistance  Transfer Comments: Significant effort, crepitus in B knees    Bed Mobility  Bed mobility  Supine to Sit: Moderate assistance, 2 Person assistance  Sit to Supine: Moderate assistance, 2 Person assistance  Scooting:  Moderate assistance  Comment: Significant effort, HOB elevated    Seated and Standing Balance:  Balance  Sitting Balance: Stand by assistance(Min A, poor trunk control, trunk flexion)  Standing Balance: Minimal assistance    Functional Endurance:  Activity Tolerance  Activity Tolerance: Patient Tolerated treatment well    D/C Recommendations:  OT D/C RECOMMENDATIONS  REQUIRES OT FOLLOW UP: Yes    Equipment Recommendations:  OT Equipment Recommendations  Other: Continue to assess    OT Education:   OT Education  OT Education: OT Role, Plan of Care  Patient Education: Educated pt. on role of acute care OT  Barriers to Learning: None    OT Follow Up:  OT D/C RECOMMENDATIONS  REQUIRES OT FOLLOW UP: Yes       Assessment/Discharge Disposition:  Assessment: Pt. is an 80year old woman from home alone who presents to [facility] with the above deficits which impact her ability to perform ADLs and IADLs. Pt would benefit from continued OT to maximize independence and safety with ADL tasks.   Performance deficits / Impairments: Decreased ADL status, Decreased functional mobility , Decreased strength, Decreased safe awareness, Decreased balance, Decreased high-level IADLs, Decreased endurance, Decreased fine motor control, Decreased coordination  Prognosis: Good  Discharge Recommendations: Continue to assess pending progress  Decision Making: Medium Complexity  History: Pt's medical history is moderately complex  Exam: Pt. has 8 performance deficits  Assistance / Modification: pt. requires mod-max A    Six Click Score   How much help for putting on and taking off regular lower body clothing?: A Lot  How much help for Bathing?: A Lot  How much help for Toileting?: A Lot  How much help for putting on and taking off regular upper body clothing?: A Little  How much help for taking care of personal grooming?: A Little  How much help for eating meals?: A Little  AM-[facility] Inpatient Daily Activity Raw Score: 15  AM-PAC Inpatient ADL T-Scale Score : 34.69  ADL Inpatient CMS 0-100% Score: 56.46    Plan:  Plan  Times per week: 1-3x  Plan weeks: Length of acute stay  Current Treatment Recommendations: Balance Training, Functional Mobility Training, Strengthening, Endurance Training, Pain Management, Safety Education & Training, Patient/Caregiver Education & Training, Equipment Evaluation, Education, & procurement, Home Management Training, Self-Care / ADL, Cognitive/Perceptual Training    Goals:   Patient will:    - Improve functional endurance to tolerate/complete 35 mins of ADL's  - Be Mod I in UB ADLs   - Be Mod I in LB ADLs  - Be Mod I in ADL transfers without LOB  - Be Mod I in toileting tasks  - Improve B hand fine motor coordination to [facility] in order to manage clothing fasteners/self-care containers in a timely manner  - Improve B UE strength and endurance to 3+/5 in order to participate in self-care activities as projected. - Access appropriate D/C site with as few architectural barriers as possible. - Sequence self-care tasks with no verbal cues for safety    Patient Goal: Patient goals : \"I want to get home- I need to be able to walk\"     Discussed and agreed upon: Yes Comments:     Therapy Time:   OT Individual Minutes  Time In: 1525  Time Out: Ed Bertrand  Minutes: 10    Eval: 10 minutes     Electronically signed by:     JAMES Villanueva  11/25/2020, 4:28 PM

## 2020-11-25 NOTE — PROCEDURES
Cardioversion Procedure Note      Patient Name:   Paula Harrison     Contact Serial Number:  [id number]    Date:     11/25/2020      Procedure: Elective synchronized cardioversion, moderate sedation. Indication: atrial fibrillation and atrial flutter    Consent: Paula Harrison counseled regarding the procedure, its indications, risks, potential complications and alternatives, and any questions were answered. Consent was obtained to proceed. Pre-Medication: 2 mg of intravenous Versed, 12.5 mg of fentanyl. Reversed with Romazicon and Narcan. Procedure: The patient was placed in the supine position and the chest area was exposed. The cardioversion pads were applied in the standard manner and configuration. 150 mg IV Amiodarone given. 0.1 mg Isidro-Synephrine IV. Started on a dopamine IV 3 mcg GTT. Attempt #1: The defibrillator was set on the synchronous mode and charged to 200 joules. A charge was then delivered which resulted in conversion to normal sinus rhythm with APCsand VPCs. .    The patient tolerated the procedure well. Complications: None    Impression: Successful elective synchronized cardioversion, Atrial Flutter to Sinus Rhythm. No Complications. Recommendations: Continue Current Medications. Return to floor.       Abbi Lazo MD  [facility] CARDIOLOGY  11/25/2020      Procedures

## 2020-11-26 NOTE — PROGRESS NOTES
Clinical Pharmacy Note    Warfarin consult follow-up    Recent Labs     11/26/20  0603   INR 5.5*     Recent Labs     11/24/20  0637 11/25/20  0612 11/26/20  0602   HGB 13.4 11.1* 10.8*   HCT 40.5 34.6* 33.5*    158 151       Significant drug:drug interactions:  New warfarin drug-drug interactions: amiodarone load (currently 400mg PO BID), Zosyn  Discontinued drug-drug interactions: Vitamin K 1mg SQ x 1 dose 11/25/20, Bactrim     Diet  Recent diet/intake: clear liquids     Notes:  Date INR Warfarin Dose    11/22/20  3.3  HOLD    11/23/20  2.6   2 mg    11/24/20  3.7  HOLD    11/25/20  > 10  HOLD  Vitamin K 1mg SQ X 1 dose     11/26/20  5.5 HOLD                        INR of 5.5 remains supra-therapeutic for goal range of 2-3 for atrial fibrillation. INR was >10 yesterday so Vitamin K 1mg SQ X 1 dose was admnistered 11/25/20. Will continue to HOLD warfarin therapy today. Patient has not been eating as much, was on Bactrim PO BID (can increase INR) and just started amiodarone therapy (can increase INR over time), all of which may contribute to supra-therapeutic INRs. Home maintenance dosage is warfarin 2mg daily or 14mg TWD. Continue with daily PT/INR during pharmacy consult to monitor and dose warfarin therapy.       Celso Palma, PharmHERRERA   11/26/2020 11:01 AM

## 2020-11-26 NOTE — CONSULTS
Consults    Patient Name: Zakiya Hughes Date: 2020  1:55 AM  MR #: [id number]  : 1933    Attending Physician: Aurora Lagos MD  Reason for consult: Possible CBD stone    History of Presenting Illness:      Hyacinth Hawthorne is a 80 y.o. female on hospital day 4 with a history of shortness of breath and was not able to ambulate.,  Patient reports having some abdominal pain but no nausea or vomiting, he was found to have abnormal LFT which is slowly improving, order ultrasound showed multiple stones and dilated common bile duct about 1 cm in size and possible stone in the distal CBD.,  Had MRCP yesterday official results are pending. Patient reports no fever or chills, sharp abdominal pain, she has difficulty in breathing and is being treated for pneumonia. No history of any hematemesis or melena. History Obtained From:  patient      History:      Past Medical History:   Diagnosis Date    Anticoagulated on Coumadin     Arthritis     Asthma     Atrial fibrillation (HCC)     History of blood transfusion     Pneumonia      Past Surgical History:   Procedure Laterality Date    COLONOSCOPY      JOINT REPLACEMENT Bilateral     bilateral femur replacements       Family History  Family History   Problem Relation Age of Onset    High Blood Pressure Mother     Osteoporosis Mother     Other Brother      [] Unable to obtain due to ventilated and/ or neurologic status    Social History     Socioeconomic History    Marital status:       Spouse name: Not on file    Number of children: 2    Years of education: 15    Highest education level: Associate degree: occupational, technical, or vocational program   Occupational History    Occupation: retired RN -[employer]   Social Needs    Financial resource strain: Not on file    Food insecurity     Worry: Never true     Inability: Never true    Transportation needs     Medical: No     Non-medical: No   Tobacco Use    Smoking status: Daily    sodium chloride flush  10 mL Intravenous BID    albumin human  50 g Intravenous BID    digoxin  125 mcg Oral BID    amiodarone  400 mg Oral BID    lactulose  30 g Oral BID    docusate sodium  100 mg Oral Daily    piperacillin-tazobactam  3.375 g Intravenous Q8H    sodium chloride flush  10 mL Intravenous 2 times per day    warfarin (COUMADIN) daily dosing (placeholder)   Other RX Placeholder     Continuous Infusions:   sodium chloride Stopped (11/25/20 2215)     PRN Meds:.sodium chloride flush, albuterol, magnesium hydroxide, bisacodyl, sodium chloride flush, acetaminophen **OR** acetaminophen, polyethylene glycol, promethazine **OR** ondansetron, metoprolol  .  sodium chloride Stopped (11/25/20 2215)        Allergies: Allergies   Allergen Reactions    Carbamazepine And Analogs     Codeine         Review of Systems:       [x] CV, Resp, Neuro, , and all other systems reviewed and negative other than listed in HPI.      [] Unable to obtain due to ventilated and/ or neurologic status      Objective Findings:     Vitals:   Vitals:    11/26/20 0600 11/26/20 0707 11/26/20 0800 11/26/20 1400   BP: 93/60 110/71 (!) 84/53 98/74   Pulse:  69  83   Resp:  17  17   Temp:  97.7 °F (36.5 °C)  98 °F (36.7 °C)   TempSrc:  Oral  Oral   SpO2:  100% 99% 100%   Weight:       Height:            Physical Examination:  General: In mild respiratory distress  HEENT: Normocephalic,  scleral icterus. None  Neck: No jugular venous distention. Heart: Regular, no murmur, no rub/gallop. No right ventricular heave. Lungs: Increased AP diameter of the chest auscultation shows prolonged expiration  Abdomen: Soft mild tenderness noted in the right upper quadrant area no palpable mass no peritoneal signs  Extremities: No clubbing/cyanosis, no edema. Skin: Warm, dry, normal turgor, no rash, no bruise, no petichiae. Neuro: No myoclonus or tremor.    Psych: Normal affect    Results/ Medications reviewed 11/26/2020, 3:22 PM     Laboratory, Microbiology, Pathology, Radiology, Cardiology, Medications and Transcriptions reviewed  Scheduled Meds:   Vitamin D  2,000 Units Oral Dinner    cyanocobalamin  1,000 mcg Intramuscular Weekly    coenzyme Q10  100 mg Oral Daily    lidocaine  3 patch Transdermal Daily    sodium chloride flush  10 mL Intravenous 2 times per day    metoprolol tartrate  12.5 mg Oral 4x Daily    sodium chloride flush  10 mL Intravenous BID    albumin human  50 g Intravenous BID    digoxin  125 mcg Oral BID    amiodarone  400 mg Oral BID    lactulose  30 g Oral BID    docusate sodium  100 mg Oral Daily    piperacillin-tazobactam  3.375 g Intravenous Q8H    sodium chloride flush  10 mL Intravenous 2 times per day    warfarin (COUMADIN) daily dosing (placeholder)   Other RX Placeholder     Continuous Infusions:   sodium chloride Stopped (11/25/20 2215)       Recent Labs     11/24/20  0637 11/25/20  0612 11/26/20  0602   WBC 13.5* 12.3* 8.5   HGB 13.4 11.1* 10.8*   HCT 40.5 34.6* 33.5*   MCV 95.3 96.3 96.8    158 151     Recent Labs     11/24/20  0637 11/25/20  0612 11/26/20  0602    141 144   K 5.0* 4.4 4.0    107 111*   CO2 26 22 20   BUN 24* 24* 28*   CREATININE 1.05* 0.92* 0.80     Recent Labs     11/24/20  0637 11/25/20  0612 11/26/20  0602   * 79* 37*   * 57* 39*   BILITOT 4.9* 2.9* 1.7*   ALKPHOS 229* 124 96     No results for input(s): LIPASE, AMYLASE in the last 72 hours. Recent Labs     11/24/20  0637 11/25/20  0612 11/25/20  1222 11/26/20  0602 11/26/20  0603   PROT 6.1* 6.2*  --  6.6  --    INR 3.7 >10.0* >10.0*  --  5.5*     Xr Chest (2 Vw)    Result Date: 11/25/2020  EXAM: CHEST, 2 VIEWS CLINICAL HISTORY:  PNA  COMPARISONS:  NONE AVAILABLE  Findings: Cardiac silhouette is within normal limits. The aorta is ectatic. R this is a low inspiratory volumes with bronchovascular crowding.  There is fullness of the right hilum which may be projectional. There are diffuse increased reticular markings throughout both lungs. The visualized bones are demineralized without displaced fractures. There is severe scoliosis of thoracolumbar spine and there are severe degenerative changes of both shoulders. Low inspiratory lung show probable retrocardiac and chronic increase and show markings in both lungs. There are no large infiltrates or consolidations. Xr Chest (2 Vw)    Result Date: 11/24/2020  EXAMINATION: XR CHEST (2 VW) CLINICAL HISTORY: ATRIAL FIBRILLATION. ASTHMA. PULMONARY FIBROSIS COMPARISONS: JANUARY 12, AND 1990 FINDINGS: Cephalad distraction of right humeral head with narrowing right glenohumeral joint and humeral acetabular interval again identified and unchanged. Likewise, degenerative change left glenohumeral joint with decreased interval left acetabulum and  humeral head unchanged. Impression thoracolumbar scoliosis with cephalad convexity to right in mid chest and caudal convexity and upper abdomen unchanged. Cardiopericardial silhouette is normal. Aorta calcified. Pulmonary vasculature is indistinct and diffuse subtle reticular change is found throughout the right and left lungs. No focal airspace disease is identified. SEVERE DEGENERATIVE CHANGE, BILATERAL SHOULDERS. THORACOLUMBAR SCOLIOSIS. BILATERAL LUNG CHANGES COMPATIBLE WITH CLINICAL HISTORY OF PULMONARY FIBROSIS. NO ACUTE FINDINGS. Ct Abdomen Pelvis W Iv Contrast Additional Contrast? None    Result Date: 11/22/2020  CT of the Abdomen and Pelvis with intravenous contrast medium History:  Left lower quadrant pain. Back pain. Left leg weakness. Technical Factors: CT imaging of the abdomen and pelvis were obtained and formatted as 5 mm contiguous axial images from the domes of the diaphragm to the symphysis pubis. Sagittal and coronal reconstructions were also obtained. Oral contrast medium:  None. Intravenous contrast medium:  Isovue-370, 100 mL.  Comparison:  CT abdomen pelvis, January 13, impaction with possible rectal stercoral colitis. Hepatic steatosis with focal fatty sparing. Spleen, lower limits of normal in size, unchanged. Small bilateral kidneys. Cholelithiasis versus high density gallbladder sludge. Severe degenerative change lumbar spine with scoliosis. Remote compression fracture, T9. Grade 1, L4, and grade one, L5, spondylolisthesis, unchanged. All CT scans at this facility use dose modulation, iterative reconstruction, and/or weight based dosing when appropriate to reduce radiation dose to as low as reasonably achievable. Xr Chest Portable    Result Date: 11/26/2020  EXAMINATION: XR CHEST PORTABLE CLINICAL HISTORY: HYPOXIA. RESPIRATORY FAILURE COMPARISONS: NOVEMBER 25, 2020, NOVEMBER 20, 2020 FINDINGS: Patient leaning to right. Low lung volumes. Osteopenia. Bilateral degenerative changes of the shoulders. Elevation right diaphragm unchanged. Ill-defined area of increased opacity right hilum, and right mid and lower lung. Left lung clear. RIGHT MID AND RIGHT LOWER LUNG ATELECTASIS/PNEUMONIA. FINDINGS UNCHANGED. Us Abdomen Limited Specify Organ? Liver, Gallbladder    Result Date: 11/24/2020  US ABDOMEN LIMITED, US DUP ABD PEL RETRO SCROT COMPLETE : 11/24/2020 CLINICAL HISTORY:  r/o cholecystitis, bile duct obstruction, portal vein thrombosis . COMPARISON: CT abdomen and pelvis with contrast 11/22/2020. TECHNIQUE: Transabdominal ultrasound of the right upper quadrant was performed, with color and waveform Doppler analysis. FINDINGS: The study is limited by the patient's high liver positioning and large amount of upper abdominal bowel gas. Numerous gallstones are noted within a mildly distended gallbladder, with borderline gallbladder wall thickening. There is mild predominantly extrahepatic biliary dilatation, with the common duct measuring approximately 1 cm in caliber at the mateo hepatis.  With review of the previous CT, there is a suggestion of faintly dense distal common duct choledocholithiasis near the papilla. Further evaluation with MRCP is suggested, as clinically warranted. The visualized liver is unremarkable, with patent portal and hepatic veins demonstrating normal waveforms and flow direction. The hepatic artery is patent. There is no ascites or other findings of concern identified. EXTENSIVE CHOLELITHIASIS WITH FINDINGS SUGGESTIVE OF ACUTE CHOLECYSTITIS. MILD PREDOMINANTLY EXTRAHEPATIC BILIARY DILATATION, AND SUSPICION OF CHOLEDOCHOLITHIASIS. FURTHER EVALUATION WITH MRCP IS SUGGESTED; AS CLINICALLY WARRANTED. UNREMARKABLE RIGHT UPPER QUADRANT DOPPLER ULTRASOUND. Us Dup Abd Pel Retro Scrot Complete    Result Date: 11/24/2020  US ABDOMEN LIMITED, US DUP ABD PEL RETRO SCROT COMPLETE : 11/24/2020 CLINICAL HISTORY:  r/o cholecystitis, bile duct obstruction, portal vein thrombosis . COMPARISON: CT abdomen and pelvis with contrast 11/22/2020. TECHNIQUE: Transabdominal ultrasound of the right upper quadrant was performed, with color and waveform Doppler analysis. FINDINGS: The study is limited by the patient's high liver positioning and large amount of upper abdominal bowel gas. Numerous gallstones are noted within a mildly distended gallbladder, with borderline gallbladder wall thickening. There is mild predominantly extrahepatic biliary dilatation, with the common duct measuring approximately 1 cm in caliber at the mateo hepatis. With review of the previous CT, there is a suggestion of faintly dense distal common duct choledocholithiasis near the papilla. Further evaluation with MRCP is suggested, as clinically warranted. The visualized liver is unremarkable, with patent portal and hepatic veins demonstrating normal waveforms and flow direction. The hepatic artery is patent. There is no ascites or other findings of concern identified. EXTENSIVE CHOLELITHIASIS WITH FINDINGS SUGGESTIVE OF ACUTE CHOLECYSTITIS.  MILD PREDOMINANTLY EXTRAHEPATIC BILIARY DILATATION, AND SUSPICION OF CHOLEDOCHOLITHIASIS. FURTHER EVALUATION WITH MRCP IS SUGGESTED; AS CLINICALLY WARRANTED. UNREMARKABLE RIGHT UPPER QUADRANT DOPPLER ULTRASOUND. Impression:   61-year-old female with abnormal LFTs which is improving, right upper quadrant ultrasound shows gallstones dilated bile duct and possible CBD stone. MRCP results are pending, most recent LFT shows improvement in serum bilirubin and transaminases  normal alk phos. Patient is on antibiotics for pneumonia and INR is high because of Coumadin  Plan:   Since LFTs are improving and there are no signs of  cholangitis ERCP can be done when her clinical status improves and INR is corrected, continue current management and follow LFT and INR. We will discuss  with Dr. Sadia Munoz. Comments: Thank you for allowing us to participate in the care of this patient. Will continue to follow. Please call if questions or concerns arise.     Electronically signed by Susan Gilliam MD on 11/26/2020 at 3:22 PM

## 2020-11-26 NOTE — PROGRESS NOTES
Called to evaluate respiratory distress. Pt is tachypneic with prominent JVD, diffuse rales and rhonchi, and requiring 100% NRB. Pt placed on bipap. 20mg lasix given. Stat CXR ordered.

## 2020-11-26 NOTE — PROGRESS NOTES
Hospitalist Progress Note      PCP: Radha Corcoran    Date of Admission: 11/22/2020    Chief Complaint:    Chief Complaint   Patient presents with    Back Pain     week long; shooting up and down left leg    Tailbone Pain     decubitus     Subjective: :    Developed respiratory distress last night and had to be placed on BiPAP. CXR showed right mid and lower lung atelectasis/pneumonia which is unchanged from prior chest x-ray. Today patient has improved and is on 2 L oxygen. Medications:  Reviewed    Infusion Medications    sodium chloride Stopped (11/25/20 6992)     Scheduled Medications    Vitamin D  2,000 Units Oral Dinner    cyanocobalamin  1,000 mcg Intramuscular Weekly    coenzyme Q10  100 mg Oral Daily    lidocaine  3 patch Transdermal Daily    metoprolol tartrate  12.5 mg Oral BID    sodium chloride flush  10 mL Intravenous 2 times per day    sodium chloride flush  10 mL Intravenous BID    albumin human  50 g Intravenous BID    amiodarone  400 mg Oral BID    lactulose  30 g Oral BID    docusate sodium  100 mg Oral Daily    piperacillin-tazobactam  3.375 g Intravenous Q8H    sodium chloride flush  10 mL Intravenous 2 times per day    warfarin (COUMADIN) daily dosing (placeholder)   Other RX Placeholder     PRN Meds: sodium chloride flush, albuterol, magnesium hydroxide, bisacodyl, sodium chloride flush, acetaminophen **OR** acetaminophen, polyethylene glycol, promethazine **OR** ondansetron, metoprolol      Intake/Output Summary (Last 24 hours) at 11/26/2020 1816  Last data filed at 11/26/2020 0800  Gross per 24 hour   Intake 1545 ml   Output --   Net 1545 ml       Exam:    BP 98/74   Pulse 83   Temp 98 °F (36.7 °C) (Oral)   Resp 17   Ht 5' 4\" (1.626 m)   Wt 130 lb 3.2 oz (59.1 kg) Comment: bed may need zeroed  LMP  (LMP Unknown)   SpO2 100%   BMI 22.35 kg/m²     General appearance: Elderly female, ill looking  HEENT:  Conjunctivae/corneas clear.   Neck: Supple, with full range of motion. Distended neck veins  Respiratory: On 2 L O2, normal respiratory effort. Bibasilar crackles  Cardiovascular: Regular rate and rhythm with systolic murmur  Abdomen: Soft, non-tender, non-distended with normal bowel sounds. Joel sign negative  Musculoskeletal: No clubbing, cyanosis or edema bilaterally. Neuro: Non Focal.  Alert, moving all extremities      Labs:   Recent Labs     11/24/20  0637 11/25/20  0612 11/26/20  0602   WBC 13.5* 12.3* 8.5   HGB 13.4 11.1* 10.8*   HCT 40.5 34.6* 33.5*    158 151     Recent Labs     11/24/20  0637 11/25/20  0612 11/26/20  0602    141 144   K 5.0* 4.4 4.0    107 111*   CO2 26 22 20   BUN 24* 24* 28*   CREATININE 1.05* 0.92* 0.80   CALCIUM 8.7 8.5 8.9     Recent Labs     11/24/20  0637 11/25/20  0612 11/26/20  0602   * 79* 37*   * 57* 39*   BILITOT 4.9* 2.9* 1.7*   ALKPHOS 229* 124 96     Recent Labs     11/25/20  0612 11/25/20  1222 11/26/20  0603   INR >10.0* >10.0* 5.5*     Recent Labs     11/25/20  0612   TROPONINI <0.010       Urinalysis:      Lab Results   Component Value Date    NITRU POSITIVE 11/22/2020    WBCUA 6-9 11/22/2020    BACTERIA MANY 11/22/2020    RBCUA 3-5 11/22/2020    BLOODU TRACE 11/22/2020    SPECGRAV 1.020 11/22/2020    GLUCOSEU Negative 11/22/2020       Radiology:  XR CHEST PORTABLE   Final Result   RIGHT MID AND RIGHT LOWER LUNG ATELECTASIS/PNEUMONIA. FINDINGS UNCHANGED. XR CHEST (2 VW)   Final Result   Low inspiratory lung show probable retrocardiac and chronic increase and show markings in both lungs. There are no large infiltrates or consolidations. US ABDOMEN LIMITED Specify organ? LIVER, GALLBLADDER   Final Result      EXTENSIVE CHOLELITHIASIS WITH FINDINGS SUGGESTIVE OF ACUTE CHOLECYSTITIS. MILD PREDOMINANTLY EXTRAHEPATIC BILIARY DILATATION, AND SUSPICION OF CHOLEDOCHOLITHIASIS. FURTHER EVALUATION WITH MRCP IS SUGGESTED; AS CLINICALLY WARRANTED.       UNREMARKABLE RIGHT UPPER QUADRANT DOPPLER ULTRASOUND. US DUP ABD PEL RETRO SCROT COMPLETE   Final Result      EXTENSIVE CHOLELITHIASIS WITH FINDINGS SUGGESTIVE OF ACUTE CHOLECYSTITIS. MILD PREDOMINANTLY EXTRAHEPATIC BILIARY DILATATION, AND SUSPICION OF CHOLEDOCHOLITHIASIS. FURTHER EVALUATION WITH MRCP IS SUGGESTED; AS CLINICALLY WARRANTED. UNREMARKABLE RIGHT UPPER QUADRANT DOPPLER ULTRASOUND. RADIOLOGY REPORT   Final Result      XR CHEST (2 VW)   Final Result   SEVERE DEGENERATIVE CHANGE, BILATERAL SHOULDERS. THORACOLUMBAR SCOLIOSIS. BILATERAL LUNG CHANGES COMPATIBLE WITH CLINICAL HISTORY OF PULMONARY FIBROSIS. NO ACUTE FINDINGS. CT ABDOMEN PELVIS W IV CONTRAST Additional Contrast? None   Final Result      Sigmoid diverticulosis. Fecal impaction with possible rectal stercoral colitis. Hepatic steatosis with focal fatty sparing. Spleen, lower limits of normal in size, unchanged. Small bilateral kidneys. Cholelithiasis versus high density gallbladder sludge. Severe degenerative change lumbar spine with scoliosis. Remote compression fracture, T9. Grade 1, L4, and grade one, L5, spondylolisthesis, unchanged. All CT scans at this facility use dose modulation, iterative reconstruction, and/or weight based dosing when appropriate to reduce radiation dose to as low as reasonably achievable.                MRI ABDOMEN W WO CONTRAST MRCP    (Results Pending)           Assessment/Plan:    Active Hospital Problems    Diagnosis Date Noted    Sepsis due to urinary tract infection ([facility] [address] [A41.9, N39.0] 11/24/2020    Pressure injury of skin of contiguous region involving back, buttock, and hip [L89.40] 11/24/2020    Constipation, acute [K59.00] 11/24/2020    LFT elevation [R79.89] 11/24/2020    Hyperkalemia [E87.5] 11/24/2020    Gait abnormality [R26.9] 11/23/2020    DANNY (generalized anxiety disorder) [F41.1] 11/23/2020    GERD (gastroesophageal reflux disease) [K21.9] 11/23/2020    Hyperlipidemia [E78.5] 11/23/2020    Major depressive disorder [F32.9] 11/23/2020    Vitamin D deficiency [E55.9] 11/23/2020    Osteoporosis [M81.0] 11/23/2020    Atrial fibrillation with rapid ventricular response ([facility] [address] [I48.91] 11/22/2020    Recurrent major depressive disorder, in full remission [facility]) [F33.42] 07/03/2018        80year-old female with history of atrial fibrillation Coumadin, asthma who presented with back pain and bilateral lower extremity weakness. Abnormal LFTs  Hypertension 2/2 sepsis  Cholecystitis on US, ? Choledocholithiasis  -Continue Zosyn  -LFTs are improving  - MRCP done, report pending  -Blood cultures in process  -General surgery and GI consulted. Atrial fibrillation s/p cardioversion 11/25/2020  Supratherapeutic INR  -INR improved to 5.5 today. Monitor INR. Holding Coumadin    Acute hypoxic respiratory failure  -Required BiPAP last night. Improved now and is on 2 L O2.  - ? 2/2 atelectasis, pneumonia or fluid overload    PILY-improving    Lower extremity weakness  - PT/OT  -May need evaluation of the spine when medically stable    Additional work up or/and treatment plan may be added today or then after based on clinical progression. I am managing a portion of pt care. Some medical issues are handled by other specialists. Additional work up and treatment should be done in out pt setting by pt PCP and other out pt providers. In addition to examining and evaluating pt, I spent additional time explaining care, normal and abnormal findings, and treatment plan. All of pt questions were answered. Counseling, diet and education were  provided. Case will be discussed with nursing staff when appropriate. Family will be updated if and when appropriate.       Diet: DIET GENERAL;    Code Status: Full Code    Electronically signed by Leslie Hu MD on 11/26/2020 at 6:16 PM

## 2020-11-26 NOTE — FLOWSHEET NOTE
2100- patient had audible wheezing and was in respiratory distress, perfect serve Dr. Karie Gonsalves, orders placed and a breathing treatment was given. Wheezing stopped and patient fell asleep. She has 2L NC and sat's in 90's-100%, VSS. Pills swallowed whole without any complications. 2200- Normal saline stopped to avoid potential fluid overload. Patient has had very little urine output with purewick in place. She has not been drinking very much due to a poor appetite. 0430-Upon entering the patient's room she had some audible wheezing, patient was given scheduled pills that were cut in smaller pieces and when the patient swallowed them she began coughing and turned pale, a non rebreather mask was placed on her for better O2 saturation. Dr. Karie Gonsalves was called to come assess the patient. 0244- patient was given lasix and had a stat CXR done.  Patient placed on Bipap    Electronically signed by Randi Sesay RN on 11/26/2020 at 5:35 AM

## 2020-11-26 NOTE — PROGRESS NOTES
Interventions:   Food and/or Nutrient Delivery:  (Resume General diet post procedure with ONS based on diet order)  Nutrition Education/Counseling:  Education not indicated   Coordination of Nutrition Care:  Continue to monitor while inpatient    Goals:  Intake >75% of meals/supplements. Stable weight. Improved wound status.        Nutrition Monitoring and Evaluation:    Food/Nutrient Intake Outcomes:  Diet Advancement/Tolerance, Food and Nutrient Intake  Physical Signs/Symptoms Outcomes:  Skin, Weight, Biochemical Data     Electronically signed by Weston Adame RD, LD on 11/25/20 at 7:26 PM EST

## 2020-11-26 NOTE — PROGRESS NOTES
Patient with some respiratory issues overnight. I reviewed the reading from the MRCP which is concerning for choledocholithiasis. Patient's INR is still 5 but trending downward. My recommendation is to have evaluation by GI for ERCP and stone extraction. No immediate surgical concerns for cholecystectomy. I would proceed with cholecystectomy in the future following recovery and possible ERCP. Continue antibiotics as scheduled.

## 2020-11-26 NOTE — PROGRESS NOTES
Pulmonary Disorder  (acute or chronic)  [x]   Severe or Chronic w/ Exacerbation  []     Surgical Status No [x]   Surgeries     General []   Surgery Lower []   Abdominal Thoracic or []   Upper Abdominal Thoracic with  PulmonaryDisorder  []     Chest X-ray Clear/Not  Ordered     [x]  Chronic Changes  Results Pending  []  Infiltrates, atelectasis, pleural effusion, or edema  []  Infiltrates in more than one lobe []  Infiltrate + Atelectasis, &/or pleural effusion  []    Respiratory Pattern Regular,  RR = 12-20 [x]  Increased,  RR = 21-25 []  BANG, irregular,  or RR = 26-30 []  Decreased FEV1  or RR = 31-35 []  Severe SOB, use  of accessory muscles, or RR ? 35  []    Mental Status Alert, oriented,  Cooperative [x]  Confused but Follows commands []  Lethargic or unable to follow commands []  Obtunded  []  Comatose  []    Breath Sounds Clear to  auscultation  []  Decreased unilaterally or  in bases only [x]  Decreased  bilaterally  []  Crackles or intermittent wheezes []  Wheezes []    Cough Strong, Spontan., & nonproductive [x]  Strong,  spontaneous, &  productive []  Weak,  Nonproductive []  Weak, productive or  with wheezes []  No spontaneous  cough or may require suctioning []    Level of Activity Ambulatory [x]  Ambulatory w/ Assist  []  Non-ambulatory []  Paraplegic []  Quadriplegic []    Total    Score:___4____     Triage Score:____5____      Tri       Triage:     1. (>20) Freq: Q3    2. (16-20) Freq: Q4   3. (11-15) Freq: QID & Albuterol Q2 PRN    4. (6-10) Freq: TID & Albuterol Q2 PRN    5. (0-5) Freq Q4prn

## 2020-11-26 NOTE — FLOWSHEET NOTE
0924am assessment completed. Pt put on NC for breakfast. Incont large loose stool and urine. Compete bath and linen change. 1115 bipap on.

## 2020-11-26 NOTE — PROGRESS NOTES
[facility] Cardiology Progress Note        Date:   2020    Patient:    Whitney Almeida    :    1933  CSN:    [id number]    Rounding Cardiologist: José Hwang MD     Primary Cardiologist: Monica Pollock MD [facility]    Requesting Physician:  Cindy Saul MD      Reason for Consult:  Afib w RVR. Whitney Almeida is a pleasant 80 y.o. female with with the above-noted past cardiac and medical history including permanent atrial fibrillation on chronic Coumadin anticoagulation, mild aortic valve stenosis, preserved left ventricular function, pulmonary fibrosis, asthma who now presents with bilateral lower extremity weakness and back pain. Patient has had progression of her symptomatology over the last several years but more recently over the last month. She feels too weak to get up and do her normal chores. She cannot climb steps now. She lives alone. She came in for evaluation through the emergency room. She was noted to have rapid atrial fibrillation. INR was 3.3. Initial troponin was negative. Apparently there was respiratory distress this morning, and patient was given IV Lasix. She appears quite comfortable now, alert oriented, does not appear as sick as her numbers would suggest.  She is tolerating clear liquids. Has no vomiting. She has decreased breath sounds at the bases posteriorly, and scattered crepitations in the lower posterior lung fields. Heart sounds are regular, did not appreciate any murmur. Abdomen is soft without rebound. Sustaining AV paced rhythm following cardioversion. Echocardiogram notes normal LV systolic function, LVEF 47% with evidence of diastolic dysfunction but no significant valvular heart disease ( Mild Moderate AS only). Laboratory data reviewed. Her liver enzymes are improving steadily, INR has decreased to 5.5 today, no active bleeding. As such, reasonable to continue amiodarone, daily EKG to follow QTC. GFR greater than 60.   Hs-CRP is greater than 200. Assessment:  Patient with chronic atrial fibrillation, underwent successful electrical cardioversion yesterday, and is on high risk medications amiodarone and warfarin. 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 GI evaluation notes reviewed. Patient tolerating clear liquids. Atrial fibrillation with rapid ventricular rate and hemodynamic instability is multifactorial clearly, atrial fibrillation with RVR is probably the bystander of sepsis in this case. Patient with preserved LV systolic function. 1. Bilateral lower extremity weakness, progressive  2. Lower back pain  3. Elevated LFTs / Abdominal discomfort. 4. Cholecystitis. 5. Urinary tract infection  6. Sepsis  7. Permanent atrial fibrillation, with RVR , Post Cardioversion to Sinus Rhythm. 8. Long-term anticoagulation, current Coumadin, Therapeutic  9. Negative Myoview perfusion stress test 10/2019,  10. Normal LV systolic function by echocardiogram 10/2019, ejection fraction 66%, Same by echo 11/20. 11. Aortic valve stenosis, mild, V-max 1.8 m/s. Same by Echo 11/20. 12. Pulmonary fibrosis   13. Asthma  14. Mild pulmonary hypertension RVSP 43 last.  15. Past smoker  16. Hypothyroidism  17. GERD  18. Depression  19. Overweight  20. Degenerative joint disease, lumbar spine stenosis with neuropathy. 21. Otherwise as prior documentation. Recommendations:  1. Continue to hold warfarin  2. Continue amiodarone, Daily EKGs  3. Warfarin issues being addressed by pharmacy. Prior to Admission medications    Medication Sig Start Date End Date Taking? Authorizing Provider   traMADol (ULTRAM) 50 MG tablet Take 50 mg by mouth every 8 hours as needed for Pain.    Yes Historical Provider, MD   metoprolol tartrate (LOPRESSOR) 50 MG tablet Take 75 mg by mouth daily   Yes Historical Provider, MD   fluticasone-salmeterol (ADVAIR DISKUS) 250-50 MCG/DOSE AEPB Inhale 1 puff into the lungs 12/28/17  Yes Historical Provider, MD - 16.0 g/dL    Hematocrit 33.5 (L) 37.0 - 47.0 %    MCV 96.8 82.0 - 100.0 fL    MCH 31.3 27.0 - 31.3 pg    MCHC 32.3 (L) 33.0 - 37.0 %    RDW 15.8 (H) 11.5 - 14.5 %    Platelets 880 205 - 428 K/uL    Neutrophils % 88.2 %    Lymphocytes % 6.3 %    Monocytes % 5.3 %    Eosinophils % 0.0 %    Basophils % 0.2 %    Neutrophils Absolute 7.5 (H) 1.4 - 6.5 K/uL    Lymphocytes Absolute 0.5 (L) 1.0 - 4.8 K/uL    Monocytes Absolute 0.5 0.2 - 0.8 K/uL    Eosinophils Absolute 0.0 0.0 - 0.7 K/uL    Basophils Absolute 0.0 0.0 - 0.2 K/uL   Comprehensive Metabolic Panel    Collection Time: 11/26/20  6:02 AM   Result Value Ref Range    Sodium 144 135 - 144 mEq/L    Potassium 4.0 3.4 - 4.9 mEq/L    Chloride 111 (H) 95 - 107 mEq/L    CO2 20 20 - 31 mEq/L    Anion Gap 13 9 - 15 mEq/L    Glucose 88 70 - 99 mg/dL    BUN 28 (H) 8 - 23 mg/dL    CREATININE 0.80 0.50 - 0.90 mg/dL    GFR Non-African American >60.0 >60    GFR  >60.0 >60    Calcium 8.9 8.5 - 9.9 mg/dL    Total Protein 6.6 6.3 - 8.0 g/dL    Alb 4.2 3.5 - 4.6 g/dL    Total Bilirubin 1.7 (H) 0.2 - 0.7 mg/dL    Alkaline Phosphatase 96 40 - 130 U/L    ALT 39 (H) 0 - 33 U/L    AST 37 (H) 0 - 35 U/L    Globulin 2.4 2.3 - 3.5 g/dL   Magnesium    Collection Time: 11/26/20  6:02 AM   Result Value Ref Range    Magnesium 2.1 1.7 - 2.4 mg/dL   High sensitivity CRP    Collection Time: 11/26/20  6:02 AM   Result Value Ref Range    CRP High Sensitivity 218.7 (H) 0.0 - 5.0 mg/L   PROTIME-INR    Collection Time: 11/26/20  6:03 AM   Result Value Ref Range    Protime 49.1 (H) 12.3 - 14.9 sec    INR 5.5 (HH)    Sedimentation Rate    Collection Time: 11/26/20  6:03 AM   Result Value Ref Range    Sed Rate 18 0 - 30 mm       ECG:     SR, with APCs / VPCs, 70s. TELEMETRY:  Afib / Flutter , 105 range, now post [facility] in SR 70s.      ECHO:     1. Abnormal echocardiogram.    2. Normal left ventricular systolic function, LVEF 65 %.    3. Moderate Aortic valve stenosis with estimated aortic valve area 1.1    cm2.    4. Moderate AI, MR.    5. Moderate Left ventricular hypertrophy.    6. Moderate Bi atrial enlargement.    7. Normal pericardium. Dr Pamela Corral's Last Office Note:    Subjective  PCP: Yanelis   Subjective: Mrs. Renata Watson follows here for mild-moderate aortic stenosis and permanent atrial fibrillation. She is on chronic anticoagulation with Coumadin managed by at [facility] [employer]  [employer] [facility]. Last echocardiogram just 1 year ago with 65% ejection fraction mild aortic stenosis with mean gradient 9 mmHg peak velocity 1.8 m/s RVSP 43. Radha Dempsey When last seen in March she had been doing quite well though does lead a fairly sedentary lifestyle. Lab work 6/30/2020 CBC normal INR that particular day 3.5 CMP remarkable only for albumin mildly decreased 3.3 glucose 102 creatinine normal 0.91 electrolytes     She denies chest pain, chest pressure, dyspnea, palpitations, lightheadedness, syncope, pnd and orthopnea. She has not recently been hospitalized or seen in an ER. Is mention that perhaps once a month she will have slight lightheadedness if she gets up too fast particularly if she has been sitting on the toilet. She does say however that she does not always maintain adequate hydration and does not always eat regularly. She has had no syncope. She has had no bleeding on anticoagulation and she has no sensation of her atrial fibrillation    Impression  1. Permanent atrial fibrillation: Rate is well controlled at rest. She has no symptoms of CHF to suggest excess rates with activity. She is had no bleeding on anticoagulation this is followed closely at [facility] [employer] clinic continue same. Although blood pressure is on the low side usually she says it is 110-120. I suspect some of the relatively low pressures are related to inadequate p.o. intake. Could consider decreasing metoprolol but she said she felt much better when I increased it from 50-75 because of better control of heart rate.  For now continue current medical regimen    2. Stenosis: Mild stenosis at echo 1 year ago the likelihood of progression to significant disease at her age is extraordinarily low and probably 0    3. Dizziness: Almost certainly due to volume depletion given that it only occurs once a month and she does describe relatively frequently actually poor p.o. intake and somewhat erratic eating habits. Courage to maintain hydration    Chief Complaint  6 month follow up   Chief Complaint Free Text:   An interactive audio and video telecommunication system which permitted real time communication was used with the patient consent to complete today's visit. Active Problems   1. Anxiety disorder (300.00) (F41.9)   2. Aortic stenosis (424.1) (I35.0)   · 10/11/2019 echo. LVEF 65%. Moderate LVH. Biatrial enlargement. Moderate aortic      stenosis peak/mean gradients 13/9 mmHg. Peak velocity 1.8 m/s. RVSP 43 mmHg   MAY 2017 echocardiogram: LVEF 66%. Biatrial enlargement. Mild RV dilatation. Aortic      valve area 1.6 cm². Peak/mean gradient 14/7 mmHg. RVSP 31 mm   3. Asthma (493.90) (J45.909)   4. Chronic GERD (530.81) (K21.9)   5. Depression (311) (F32.9)   6. Dizziness (780.4) (R42)   7. High risk medication use (V58.69) (Z79.899)   8. Hyperlipidemia (272.4) (E78.5)   9. Hypothyroidism (244.9) (E03.9)   10. Normal cardiac stress test   · 10/11/2019. Lexiscan perfusion. Normal perfusion. 66% LVEF   11. Overweight (278.02) (E66.3)   12. Permanent atrial fibrillation (427.31) (I48.21)   Initial diagnosis prior to 2014 paroxysmal at that time. 2017 was far more frequent and      antiarrhythmic therapy was considered but rate controlled preferred by patient. As of      September 2 019: Likely permanent. 13. Pulmonary fibrosis (515) (J84.10)   14. Right carotid bruit (785.9) (R09.89)   · 10/11/2019. Carotid duplex. Less than 50% bilateral stenosis with mild diffuse plaque   15. Former smoker (T90.86) (S73.862)    Family History   1.  Family history of hypertension (V17.49) (Z82.49) : Mother   2. Family history of malignant neoplasm (V16.9) (Z80.9) : Brother    Social History   · Daily caffeine consumption, 4-5 servings a day   · Former smoker (V15.82) (T07.620)   · No illicit drug use   · Occasional alcohol consumption (V49.89) (Z78.9)    Surgical History  Denies any surgeries or colonoscopy since last appt. ANoble, LPN     Current Meds   1. Metoprolol Succinate ER 25 MG Oral Tablet Extended Release 24 Hour; one tablet at   bedtime in addition to 50mg=75mg; Therapy: [id number] to (Evaluate:[id number])  Requested for: 10SQS7687; Last   Rx:[id number] Ordered   2. Metoprolol Succinate ER 50 MG Oral Tablet Extended Release 24 Hour; 1 tablet at   bedtime in addition to 25mg=75mg; Therapy: [id number] to (Evaluate:46Zsv2620)  Requested for: 80Kei0592; Last   Rx:[id number] Ordered   3. traMADol HCl - 50 MG Oral Tablet; TAKE 1 TABLET 3 TIMES DAILY AS NEEDED; Therapy: [id number] to Recorded   4. Vitamin D3 50 MCG (2000 UT) Oral Tablet; take 1 tablet daily; Therapy: (Ta Cervantes) to Recorded   5. Warfarin Sodium 2 MG Oral Tablet; take 1 tabelt daily as directed. managed by pcp office; Therapy: (Recorded:54Clm6611) to Recorded   6. Warfarin Sodium 2 MG Oral Tablet; TAKE 1 TABLET DAILY; Therapy: [id number] to Recorded   7. Senia Pick 250-50 MCG/DOSE Inhalation Aerosol Powder Breath Activated; INHALE 1   PUFF TWICE DAILY; Therapy: [id number] to Recorded    Meds reviewed with daughter with list on her phone, via phone call. ANoble, LPN     Allergies   1. codeine   Hallucinations;; Updated By: Nery Bonilla; 10/22/2019 1:25:31 PM   2.  TEGretol TABS   also had abnormal blood levels; Rash; Recorded By: Nery Bonilla; 10/22/2019 1:25:32 PM

## 2020-11-26 NOTE — PROGRESS NOTES
Subjective: The patient complains of severe  chronic progresive generalized weakness partially relieved by rest, IV hydration, PT, OT and meds and exacerbated by exertion and recent illness. I am concerned about her sacral decub her external female catheter risk for neurogenic bowel and bladder and extremely low blood pressure. Reviewed recent MD note, \" Called to evaluate respiratory distress. Pt is tachypneic with prominent JVD, diffuse rales and rhonchi, and requiring 100% NRB. Pt placed on bipap. 20mg lasix given. Stat CXR ordered \". She seems to have atelectasis and pneumonia--much better since diuresis . ROS x10: The patient also complains of severely impaired mobility and activities of daily living. Otherwise no new problems with vision, hearing, nose, mouth, throat, dermal, cardiovascular, GI, , pulmonary, musculoskeletal, psychiatric or neurological. See Rehab consult on Rehab chart . Vital signs:  BP (!) 84/53   Pulse 69   Temp 97.7 °F (36.5 °C) (Oral)   Resp 17   Ht 5' 4\" (1.626 m)   Wt 130 lb 3.2 oz (59.1 kg) Comment: bed may need zeroed  LMP  (LMP Unknown)   SpO2 99%   BMI 22.35 kg/m²   I/O:   PO/Intake:    fair PO intake,   Was NPO-->clear liquids    Bowel/Bladder:    Severe constipation improving  General:  Patient is well developed, adequately nourished, non-obese and     well kempt. HEENT:    PERRLA, hearing intact to loud voice, external inspection of ear     and nose benign. Inspection of lips, tongue and gums benign  Musculoskeletal: No significant change in strength or tone. All joints stable. Inspection and palpation of digits and nails show no clubbing,       cyanosis or inflammatory conditions. Neuro/Psychiatric: Affect: flat-  Alert and oriented to self and     Situation . No significant change in deep tendon reflexes or     sensation  Lungs:  Diminished, CTA-B. Respiration effort is normal at rest.     Heart:   S1 = S2,   Afib w RVR at  times.   No Panel    Collection Time: 11/26/20  6:02 AM   Result Value Ref Range    Sodium 144 135 - 144 mEq/L    Potassium 4.0 3.4 - 4.9 mEq/L    Chloride 111 (H) 95 - 107 mEq/L    CO2 20 20 - 31 mEq/L    Anion Gap 13 9 - 15 mEq/L    Glucose 88 70 - 99 mg/dL    BUN 28 (H) 8 - 23 mg/dL    CREATININE 0.80 0.50 - 0.90 mg/dL    GFR Non-African American >60.0 >60    GFR  >60.0 >60    Calcium 8.9 8.5 - 9.9 mg/dL    Total Protein 6.6 6.3 - 8.0 g/dL    Alb 4.2 3.5 - 4.6 g/dL    Total Bilirubin 1.7 (H) 0.2 - 0.7 mg/dL    Alkaline Phosphatase 96 40 - 130 U/L    ALT 39 (H) 0 - 33 U/L    AST 37 (H) 0 - 35 U/L    Globulin 2.4 2.3 - 3.5 g/dL   Magnesium    Collection Time: 11/26/20  6:02 AM   Result Value Ref Range    Magnesium 2.1 1.7 - 2.4 mg/dL   High sensitivity CRP    Collection Time: 11/26/20  6:02 AM   Result Value Ref Range    CRP High Sensitivity 218.7 (H) 0.0 - 5.0 mg/L   PROTIME-INR    Collection Time: 11/26/20  6:03 AM   Result Value Ref Range    Protime 49.1 (H) 12.3 - 14.9 sec    INR 5.5 (HH)    Sedimentation Rate    Collection Time: 11/26/20  6:03 AM   Result Value Ref Range    Sed Rate 18 0 - 30 mm       Previous extensive, complex labs, notes and diagnostics reviewed and analyzed. ALLERGIES:    Allergies as of 11/22/2020 - Review Complete 11/22/2020   Allergen Reaction Noted    Carbamazepine and analogs  09/13/2010    Codeine  09/13/2010      (please also verify by checking MAR)     Discussed starting vitamins with her and her dtr. Complex Physical Medicine & Rehab Issues Assess & Plan:   1. Severe abnormality of gait and mobility and impaired self-care and ADL's secondary to progressive lumbar spinal stenosis and neurogenic bowel and bladder.   Functional and medical status reassessed regarding patients ability to participate in therapies and patient found to be able to participate in  acute intensive comprehensive inpatient rehabilitation program including PT/OT to improve balance, ambulation, ADLs, and to improve the P/AROM. It is my opinion that they will be able to tolerate 3 hours of therapy a day and benefit from it at an acute level. 2. Bowel fecal impaction constipation and Bladder dysfunction atonic bladder:  frequent toileting, ambulate to bathroom with assistance, check post void residuals. Check for C.difficile x1 if >2 loose stools in 24 hours, continue bowel & bladder program.  Monitor for UTI symptoms including lethargy and confusion  3. Severe thoracic and low back with multiple compression fractures and generalized OA pain: reassess pain every shift and prior to and after each therapy session, give prn  Tylenol, modalities prn in therapy, consider Lidoderm, K-pad prn.   4. Skin breakdown sacrum: Strict side-to-side turns consider plexus mattress continue pressure relief program.  Daily skin exams and reports from nursing. Plexus mattress. 5. Severe fatigue due to immobility and nutritional deficits: Add vitamin B12 vitamin D and CoQ10 titrate dosing and add protein supplementation with low carb content. Patient getting IV albumin. 6. Complex discharge planning:   Await medical stability-GI testing and BP correction--discussed at length with medical and cardio. Add Vit d, B12...coq10 etc.    Complex Active General Medical Issues that complicate care Assess & Plan:     1. Active Problems:    Atrial fibrillation with rapid ventricular response (HCC)    Gait abnormality    DANNY (generalized anxiety disorder)    GERD (gastroesophageal reflux disease)    Hyperlipidemia    Major depressive disorder    Osteoporosis    Recurrent major depressive disorder, in full remission ([facility] [address]    Vitamin D deficiency    Sepsis due to urinary tract infection ([facility] [address]    Pressure injury of skin of contiguous region involving back, buttock, and hip    Constipation, acute    LFT elevation    Hyperkalemia  Resolved Problems:    * No resolved hospital problems.  Harriet Garcia D.O., PM&R     Attending [address]

## 2020-11-27 NOTE — FLOWSHEET NOTE
Pt flipped into Afib/currently controlled rate in 80-90's. EKG preformed to confirm. HS meds admin as prescribed. Dr. Carson Ovens notified/no new orders at this time.

## 2020-11-27 NOTE — PROGRESS NOTES
[facility]   Facility/Department: [facility] TELEMETRY  Speech Language Pathology  Clinical Bedside Swallow Evaluation    NAME:Nikki Watson  : 1933 (80 y.o.)   MRN: [id number]  [place] [facility]  ADMISSION DATE: 2020  PATIENT DIAGNOSIS(ES): Atrial fibrillation with rapid ventricular response ([address] [I48.91]  Chief Complaint   Patient presents with    Back Pain     week long; shooting up and down left leg    Tailbone Pain     decubitus     Patient Active Problem List    Diagnosis Date Noted    Sepsis due to urinary tract infection ([address] 2020    Pressure injury of skin of contiguous region involving back, buttock, and hip 2020    Constipation, acute 2020    LFT elevation 2020    Hyperkalemia 2020    Gait abnormality 2020    Aortic stenosis 2020    DANNY (generalized anxiety disorder) 2020    GERD (gastroesophageal reflux disease) 2020    Hyperlipidemia 2020    Major depressive disorder 2020    Osteoporosis 2020    Vitamin D deficiency 2020    Atrial fibrillation with rapid ventricular response ([address] 2020    Pneumonia 2020    Osteoarthritis, generalized 10/01/2019    Hypoxia 2019    Abrasion of buttock 2018    Recurrent major depressive disorder, in full remission ([address] 2018     Past Medical History:   Diagnosis Date    Anticoagulated on Coumadin     Arthritis     Asthma     Atrial fibrillation ([address]     History of blood transfusion     Pneumonia      Past Surgical History:   Procedure Laterality Date    COLONOSCOPY      JOINT REPLACEMENT Bilateral     bilateral femur replacements     Allergies   Allergen Reactions    Carbamazepine And Analogs     Codeine        DATE ONSET: 2020    Date of Evaluation: 2020   Evaluating Therapist: Sha Acuña, DAWIT    Recommended Diet and Intervention  Diet Solids Recommendation: Dysphagia Pureed (Dysphagia I)  Liquid Consistency Recommendation: Mildly Thick (Nectar)     Recommendations: Dysphagia treatment  Therapeutic Interventions: Diet tolerance monitoring, Therapeutic PO trials with SLP, Patient/Family education    Compensatory Swallowing Strategies  Compensatory Swallowing Strategies: Upright as possible for all oral intake;Small bites/sips; Alternate solids and liquids    Reason for Referral  Rah Elias was referred for a bedside swallow evaluation to assess the efficiency of her swallow function, identify signs and symptoms of aspiration and make recommendations regarding safe dietary consistencies, effective compensatory strategies, and safe eating environment. General  Chart Reviewed: Yes  Behavior/Cognition: Alert; Cooperative; Requires cueing  Respiratory Status: O2 via nasual cannula  O2 Device: Nasal cannula  Communication Observation: Functional  Follows Directions: Simple  Dentition: Dentures bottom(Upper partials. Pt declined to put in for exam.)  Patient Positioning: Partially reclined  Baseline Vocal Quality: Normal  Volitional Cough: Weak  Prior Dysphagia History: Pt reports decreased appetite. Pt reports she is hesistant to try solid foods due to more difficulty swallowing. Consistencies Administered: Dysphagia Minced and Moist (Dysphagia II); Dysphagia Pureed (Dysphagia I); Thin - cup; Thin - straw;Nectar - cup    Current Diet level:  Current Diet : Dysphagia Minced and Moist (Dysphagia II)  Current Liquid Diet : Thin    Oral Motor Deficits  Oral/Motor  Oral Motor: Within functional limits(dry oral cavity noted)    Oral Phase Dysfunction  Oral Phase  Oral Phase: Exceptions  Oral Phase Dysfunction  Impaired Mastication: Mechanical soft  Decreased Anterior to Posterior Transit: All  Suspected Premature Bolus Loss:  Thin  Lingual/Palatal Residue: Mechanical soft  Oral Phase  Oral Phase - Comment: Moderate oral dysphagia characterized by decreased bolus propulsion with oral holding with all

## 2020-11-27 NOTE — PROGRESS NOTES
Clinical Pharmacy Note    Warfarin consult follow-up    Recent Labs     11/27/20  0607   INR 2.4     Recent Labs     11/25/20  0612 11/26/20  0602 11/27/20  0607   HGB 11.1* 10.8* 11.1*   HCT 34.6* 33.5* 34.6*    151 156       Significant drug:drug interactions:  New warfarin drug-drug interactions: None  Discontinued drug-drug interactions: None  Current warfarin-drug interactions: Acetaminophen, amiodarone, Zosyn    Notes:  Date INR Warfarin Dose    11/22/20  3.3  HOLD    11/23/20  2.6   2 mg    11/24/20  3.7  HOLD    11/25/20  > 10  HOLD  Vitamin K 1mg SQ X 1 dose     11/26/20  5.5 HOLD     11/27/20  2.4  5 mg             INR has reached therapeutic range at 2.4. Give warfarin 5 mg today to maintain INR within the goal range of 2-3 for atrial fibrillation. This is an increased dose from the patient's usual home dosage regimen of warfarin 2 mg daily, but expect INR to continue to drop due to three held doses and vitamin K 1 mg dose administered yesterday. Daily PT/INR until stable within therapeutic range.     Gustabo Allison PharmD   11/27/2020 10:35 AM

## 2020-11-27 NOTE — PROGRESS NOTES
Hospitalist Progress Note      PCP: Nazia Olmedo    Date of Admission: 11/22/2020    Chief Complaint:    Chief Complaint   Patient presents with    Back Pain     week long; shooting up and down left leg    Tailbone Pain     decubitus     Subjective: :    Patient reports feeling short of breath today. She was on BiPAP briefly last night but states she does not want it anymore. Medications:  Reviewed    Infusion Medications    sodium chloride Stopped (11/25/20 3935)     Scheduled Medications    Vitamin D  2,000 Units Oral Dinner    cyanocobalamin  1,000 mcg Intramuscular Weekly    coenzyme Q10  100 mg Oral Daily    lidocaine  3 patch Transdermal Daily    metoprolol tartrate  12.5 mg Oral BID    sodium chloride flush  10 mL Intravenous 2 times per day    albumin human  50 g Intravenous BID    amiodarone  400 mg Oral BID    lactulose  30 g Oral BID    docusate sodium  100 mg Oral Daily    piperacillin-tazobactam  3.375 g Intravenous Q8H    sodium chloride flush  10 mL Intravenous 2 times per day    warfarin (COUMADIN) daily dosing (placeholder)   Other RX Placeholder     PRN Meds: sodium chloride flush, albuterol, magnesium hydroxide, bisacodyl, sodium chloride flush, acetaminophen **OR** acetaminophen, polyethylene glycol, promethazine **OR** ondansetron, metoprolol      Intake/Output Summary (Last 24 hours) at 11/27/2020 0820  Last data filed at 11/27/2020 0441  Gross per 24 hour   Intake 440 ml   Output 1150 ml   Net -710 ml       Exam:    BP 98/62   Pulse 92   Temp 98.1 °F (36.7 °C) (Oral)   Resp 15   Ht 5' 4\" (1.626 m)   Wt 130 lb 3.2 oz (59.1 kg) Comment: bed may need zeroed  LMP  (LMP Unknown)   SpO2 99%   BMI 22.35 kg/m²     General appearance: Elderly female, slightly labored breathing. HEENT:  Conjunctivae/corneas clear. Neck: Supple, with full range of motion. Distended neck veins  Respiratory:  On 2 L O2,  Bibasilar crackles  Cardiovascular: Regular rate and rhythm with systolic murmur  Abdomen: Soft, non-tender, non-distended with normal bowel sounds. Joel sign negative  Musculoskeletal: No clubbing, cyanosis or edema bilaterally. Neuro: Non Focal.  Alert, moving all extremities      Labs:   Recent Labs     11/25/20  0612 11/26/20  0602 11/27/20  0607   WBC 12.3* 8.5 8.6   HGB 11.1* 10.8* 11.1*   HCT 34.6* 33.5* 34.6*    151 156     Recent Labs     11/25/20  0612 11/26/20  0602 11/27/20  0607    144 141   K 4.4 4.0 4.3    111* 107   CO2 22 20 22   BUN 24* 28* 27*   CREATININE 0.92* 0.80 0.77   CALCIUM 8.5 8.9 9.4     Recent Labs     11/25/20  0612 11/26/20  0602 11/27/20  0607   AST 79* 37* 21   ALT 57* 39* 23   BILITOT 2.9* 1.7* 1.6*   ALKPHOS 124 96 73     Recent Labs     11/25/20  1222 11/26/20  0603 11/27/20  0607   INR >10.0* 5.5* 2.4     Recent Labs     11/25/20  0612   TROPONINI <0.010       Urinalysis:      Lab Results   Component Value Date    NITRU POSITIVE 11/22/2020    WBCUA 6-9 11/22/2020    BACTERIA MANY 11/22/2020    RBCUA 3-5 11/22/2020    BLOODU TRACE 11/22/2020    SPECGRAV 1.020 11/22/2020    GLUCOSEU Negative 11/22/2020       Radiology:  XR CHEST PORTABLE   Final Result   RIGHT MID AND RIGHT LOWER LUNG ATELECTASIS/PNEUMONIA. FINDINGS UNCHANGED. XR CHEST (2 VW)   Final Result   Low inspiratory lung show probable retrocardiac and chronic increase and show markings in both lungs. There are no large infiltrates or consolidations. US ABDOMEN LIMITED Specify organ? LIVER, GALLBLADDER   Final Result      EXTENSIVE CHOLELITHIASIS WITH FINDINGS SUGGESTIVE OF ACUTE CHOLECYSTITIS. MILD PREDOMINANTLY EXTRAHEPATIC BILIARY DILATATION, AND SUSPICION OF CHOLEDOCHOLITHIASIS. FURTHER EVALUATION WITH MRCP IS SUGGESTED; AS CLINICALLY WARRANTED. UNREMARKABLE RIGHT UPPER QUADRANT DOPPLER ULTRASOUND.                US DUP ABD PEL RETRO SCROT COMPLETE   Final Result      EXTENSIVE CHOLELITHIASIS WITH FINDINGS SUGGESTIVE OF [E55.9] 11/23/2020    Osteoporosis [M81.0] 11/23/2020    Atrial fibrillation with rapid ventricular response ([facility] [address] [I48.91] 11/22/2020    Recurrent major depressive disorder, in full remission [facility]) [F33.42] 07/03/2018        80year-old female with history of atrial fibrillation Coumadin, asthma who presented with back pain and bilateral lower extremity weakness. Abnormal LFTs  Hypertension 2/2 sepsis  Cholecystitis on US, ? Choledocholithiasis  -Continue Zosyn  -LFTs are improving  - MRCP showed choledocholithiasis. -Blood cultures -no growth  -General surgery and GI consulted. GI planning to do ERCP on Monday    Atrial fibrillation s/p cardioversion 11/25/2020  Supratherapeutic INR  -INR 2.4 today. Hold Coumadin for ERCP on Monday. Start Lovenox when INR is less than 2    Acute hypoxic respiratory failure  -Required BiPAP last night. Improved now and is on 2 L O2.  - ? 2/2 atelectasis, fluid overload +/- pneumonia  - ECHO - EF 65%  - will give 20 mg of IV lasix. PILY -resolved    Lower extremity weakness  - PT/OT  -May need evaluation of the spine when medically stable    Additional work up or/and treatment plan may be added today or then after based on clinical progression. I am managing a portion of pt care. Some medical issues are handled by other specialists. Additional work up and treatment should be done in out pt setting by pt PCP and other out pt providers. In addition to examining and evaluating pt, I spent additional time explaining care, normal and abnormal findings, and treatment plan. All of pt questions were answered. Counseling, diet and education were  provided. Case will be discussed with nursing staff when appropriate. Family will be updated if and when appropriate.       Diet: DIET GENERAL; Dysphagia Minced and Moist    Code Status: Full Code    Electronically signed by Mary Currie MD on 11/27/2020 at 8:20 AM

## 2020-11-27 NOTE — PROGRESS NOTES
Progress Note  Date:2020       Room:W177/W177-01  Patient Lizette Vincent     YOB: 1933     Age:87 y.o. Subjective    Subjective vague upper abdominal discomfort but no nausea no vomiting, no fever or chills  Review of Systems MRCP showed CBD stone and bile duct was dilated up to 10 mm,   Objective         Vitals Last 24 Hours:  TEMPERATURE:  Temp  Av °F (36.7 °C)  Min: 97.8 °F (36.6 °C)  Max: 98.1 °F (36.7 °C)  RESPIRATIONS RANGE: Resp  Av.5  Min: 15  Max: 18  PULSE OXIMETRY RANGE: SpO2  Av %  Min: 96 %  Max: 100 %  PULSE RANGE: Pulse  Av.3  Min: 83  Max: 92  BLOOD PRESSURE RANGE: Systolic (78IGI), CUE:56 , Min:87 , WTK:981   ; Diastolic (74SHM), CGZ:80, Min:62, Max:74    I/O (24Hr): Intake/Output Summary (Last 24 hours) at 2020 1302  Last data filed at 2020 0842  Gross per 24 hour   Intake 205 ml   Output 1150 ml   Net -945 ml     Objective  Labs/Imaging/Diagnostics    Labs:  CBC:  Recent Labs     20  0612 20  0602 20  0607   WBC 12.3* 8.5 8.6   RBC 3.60* 3.46* 3.57*   HGB 11.1* 10.8* 11.1*   HCT 34.6* 33.5* 34.6*   MCV 96.3 96.8 97.0   RDW 15.2* 15.8* 15.2*    151 156     CHEMISTRIES:  Recent Labs     20  0612 20  0602 20  0607    144 141   K 4.4 4.0 4.3    111* 107   CO2 22 20 22   BUN 24* 28* 27*   CREATININE 0.92* 0.80 0.77   GLUCOSE 92 88 76   MG 2.1 2.1 2.0     PT/INR:  Recent Labs     20  1222 20  0603 20  0607   PROTIME 91.8* 49.1* 25.9*   INR >10.0* 5.5* 2.4     APTT:No results for input(s): APTT in the last 72 hours. LIVER PROFILE:  Recent Labs     20  0612 20  0602 20  0607   AST 79* 37* 21   ALT 57* 39* 23   BILITOT 2.9* 1.7* 1.6*   ALKPHOS 124 96 73       Imaging Last 24 Hours:  Xr Chest Portable    Result Date: 2020  EXAMINATION: XR CHEST PORTABLE CLINICAL HISTORY: HYPOXIA.  RESPIRATORY FAILURE COMPARISONS: 2020, [address] [facility] 20, 2020 FINDINGS: Patient leaning to right. Low lung volumes. Osteopenia. Bilateral degenerative changes of the shoulders. Elevation right diaphragm unchanged. Ill-defined area of increased opacity right hilum, and right mid and lower lung. Left lung clear. RIGHT MID AND RIGHT LOWER LUNG ATELECTASIS/PNEUMONIA. FINDINGS UNCHANGED. Mri Abdomen W Wo Contrast Mrcp    Result Date: 11/27/2020  EXAM: MRI ABDOMEN W WO CONTRAST MRCP HISTORY: Choledocholithiasis. TECHNIQUE: Multiplanar multisequence MRI of the abdomen was performed without and with contrast including 2-D and 3-D MRCP imaging. COMPARISON: Ultrasound from November 24, 2020 and CT of the abdomen and pelvis from November 22, 2020 FINDINGS: Examination is degraded by motion artifact. There appears to be an 8 mm filling defect within the distal common bile duct. A possible second smaller filling defect is questioned. The common bile duct is dilated measuring up to 11 mm in diameter. Mild intrahepatic biliary dilatation. The gallbladder contains numerous small gallstones. The gallbladder wall appears mildly thickened measuring approximately 4 mm in thickness. There is a small amount of pericholecystic fluid. There is a small amount of peripancreatic fluid about the pancreatic head/uncinate process. A small amount of fluid is also present along the duodenum. No enhancing pancreatic mass. The pancreatic duct is not dilated. 2 small hepatic cysts are identified. No enhancing liver lesion. The kidneys enhance uniformly. No hydronephrosis. No upper abdominal lymphadenopathy identified. Bone marrow signal appears normal. Small bilateral pleural effusions with adjacent atelectasis. One if not two stones within the distal common bile measuring up to 8 mm duct, resulting in dilation of the common bile duct up to 11 mm and mild intrahepatic biliary dilatation.  Cholelithiasis, gallbladder wall thickening, and a small amount of pericholecystic fluid is concerning for acute cholecystitis. A small amount of fluid along the duodenum and head/uncinate process of the pancreas may be due to acute cholecystitis however correlation for pancreatitis is recommended. Assessment//Plan           Hospital Problems           Last Modified POA    Atrial fibrillation with rapid ventricular response ([facility] [address] 11/22/2020 Yes    Gait abnormality 11/23/2020 Yes    DANNY (generalized anxiety disorder) 11/23/2020 Yes    GERD (gastroesophageal reflux disease) 11/23/2020 Yes    Hyperlipidemia 11/23/2020 Yes    Major depressive disorder 11/23/2020 Yes    Osteoporosis 11/24/2020 Yes    Overview Signed 11/23/2020 10:07 AM by Obinna Steinberg DO     Overview:   Fragility fx         Recurrent major depressive disorder, in full remission ([address] 11/23/2020 Yes    Vitamin D deficiency 11/23/2020 Yes    Sepsis due to urinary tract infection ([address] 11/24/2020 Yes    Pressure injury of skin of contiguous region involving back, buttock, and hip 11/24/2020 Yes    Constipation, acute 11/24/2020 Yes    LFT elevation 11/24/2020 Yes    Hyperkalemia 11/24/2020 Yes        Assessment & Plan  80 old female with history of CBD stone, left improving, was on Coumadin and her INR which is also improving, no signs of any cholangitis, I discussed the case with Dr. Marc Hu and hopefully will do ERCP on Monday.   Electronically signed by Any Gallo MD on 11/27/20 at 1:02 PM EST

## 2020-11-27 NOTE — CONSULTS
Pulmonary Medicine  Consult Note      Reason for consultation: Hypoxia          HISTORY OF PRESENT ILLNESS:    This is a 70-year-old white female who was admitted to the hospital 5 days ago with progressive weakness, back pain, who also was found to have a decubitus ulcer, patient developed right upper abdominal pain, work-up included evidence of liver enzyme abnormality. Ultrasound showed cholelithiasis with evidence of cholecystitis. Patient has been bedbound and immobile, poor inspiratory effort and cough noted. She was noted to have hypoxia and increasing need for oxygen supplement. Yesterday morning patient developed significant hypoxia and required initiating BiPAP but she became intolerant to BiPAP use and refused to use it anymore, since then she has improved and remained stable on oxygen supplement at 3 L. Patient has significant cardiac history as well, including permanent atrial fibrillation, mild aortic stenosis and history of pulmonary fibrosis reported with evidence of peripheral subpleural fibrotic changes by CT of the abdomen. Past Medical History:        Diagnosis Date    Anticoagulated on Coumadin     Arthritis     Asthma     Atrial fibrillation (HCC)     History of blood transfusion     Pneumonia        Past Surgical History:        Procedure Laterality Date    COLONOSCOPY      JOINT REPLACEMENT Bilateral     bilateral femur replacements       Social History:     reports that she has quit smoking. Her smoking use included cigarettes. She smoked 0.25 packs per day. She has never used smokeless tobacco. She reports previous alcohol use. She reports previous drug use.     Family History:       Problem Relation Age of Onset    High Blood Pressure Mother     Osteoporosis Mother     Other Brother        Allergies:  Carbamazepine and analogs and Codeine        MEDICATIONS during current hospitalization:    Continuous Infusions:    Scheduled Meds:   Vitamin D  2,000 Units Oral Dinner    cyanocobalamin  1,000 mcg Intramuscular Weekly    coenzyme Q10  100 mg Oral Daily    lidocaine  3 patch Transdermal Daily    metoprolol tartrate  12.5 mg Oral BID    sodium chloride flush  10 mL Intravenous 2 times per day    albumin human  50 g Intravenous BID    amiodarone  400 mg Oral BID    lactulose  30 g Oral BID    docusate sodium  100 mg Oral Daily    piperacillin-tazobactam  3.375 g Intravenous Q8H    sodium chloride flush  10 mL Intravenous 2 times per day       PRN Meds:sodium chloride flush, albuterol, magnesium hydroxide, bisacodyl, sodium chloride flush, acetaminophen **OR** acetaminophen, polyethylene glycol, promethazine **OR** ondansetron, metoprolol        REVIEW OF SYSTEMS:  As in history of present illness  Other 14 point review of system is negative. PHYSICAL EXAM:    Vitals:  /74   Pulse 97   Temp 97.6 °F (36.4 °C) (Oral)   Resp 20   Ht 5' 4\" (1.626 m)   Wt 130 lb 3.2 oz (59.1 kg) Comment: bed may need zeroed  LMP  (LMP Unknown)   SpO2 94%   BMI 22.35 kg/m²   General: Patient is currently alert, awake . comfortable in bed, No distress. Head: Atraumatic , Normocephalic   Eyes: PERRL. No icteric sclera. No conjunctival injection. No discharge   ENT: No nasal  discharge. Pharynx clear. Neck:  Trachea midline. No thyromegaly, no JVD, No cervical adenopathy. Chest : Adequate effort, symmetric bilateral excursions  Lung : Mildly decreased breath sounds bilaterally, basilar inspiratory crackles noted  Heart[de-identified] Irregular rhythm and rate. No mumur ,  Rub or gallop  ABD: Benign. Non-tender. Non-distended. No masses or organmegaly. Normal bowel sounds. EXT: Mild pitting edema both lower extremities , No Cyanosis No clubbing  Neuro: no focal weakness  Skin: Warm and dry. No erythema or rash on exposed extremities. .      Data Review  Recent Labs     11/25/20  0612 11/26/20  0602 11/27/20  0607   WBC 12.3* 8.5 8.6   HGB 11.1* 10.8* 11.1*   HCT 34.6* 33.5* 34.6*  151 156      Recent Labs     11/25/20  0612 11/26/20  0602 11/27/20  0607    144 141   K 4.4 4.0 4.3    111* 107   CO2 22 20 22   BUN 24* 28* 27*   CREATININE 0.92* 0.80 0.77   GLUCOSE 92 88 76       MV Settings: ABGs: No results for input(s): PHART, CLP9IZM, PO2ART, DEB0JML, BEART, Z7GXWTPL, TTM4XJL in the last 72 hours. O2 Device: Nasal cannula  O2 Flow Rate (L/min): 2 L/min  Lab Results   Component Value Date    LACTA 1.5 11/22/2020    LACTA 1.9 01/12/2020    LACTA 2.8 01/12/2020       Radiology  Xr Chest (2 Vw)    Result Date: 11/25/2020  EXAM: CHEST, 2 VIEWS CLINICAL HISTORY:  PNA  COMPARISONS:  NONE AVAILABLE  Findings: Cardiac silhouette is within normal limits. The aorta is ectatic. R this is a low inspiratory volumes with bronchovascular crowding. There is fullness of the right hilum which may be projectional. There are diffuse increased reticular markings throughout both lungs. The visualized bones are demineralized without displaced fractures. There is severe scoliosis of thoracolumbar spine and there are severe degenerative changes of both shoulders. Low inspiratory lung show probable retrocardiac and chronic increase and show markings in both lungs. There are no large infiltrates or consolidations. Xr Chest (2 Vw)    Result Date: 11/24/2020  EXAMINATION: XR CHEST (2 VW) CLINICAL HISTORY: ATRIAL FIBRILLATION. ASTHMA. PULMONARY FIBROSIS COMPARISONS: JANUARY 12, AND 1990 FINDINGS: Cephalad distraction of right humeral head with narrowing right glenohumeral joint and humeral acetabular interval again identified and unchanged. Likewise, degenerative change left glenohumeral joint with decreased interval left acetabulum and  humeral head unchanged. Impression thoracolumbar scoliosis with cephalad convexity to right in mid chest and caudal convexity and upper abdomen unchanged. Cardiopericardial silhouette is normal. Aorta calcified.  Pulmonary vasculature is indistinct and diffuse subtle reticular change is found throughout the right and left lungs. No focal airspace disease is identified. SEVERE DEGENERATIVE CHANGE, BILATERAL SHOULDERS. THORACOLUMBAR SCOLIOSIS. BILATERAL LUNG CHANGES COMPATIBLE WITH CLINICAL HISTORY OF PULMONARY FIBROSIS. NO ACUTE FINDINGS. Ct Abdomen Pelvis W Iv Contrast Additional Contrast? None    Result Date: 11/22/2020  CT of the Abdomen and Pelvis with intravenous contrast medium History:  Left lower quadrant pain. Back pain. Left leg weakness. Technical Factors: CT imaging of the abdomen and pelvis were obtained and formatted as 5 mm contiguous axial images from the domes of the diaphragm to the symphysis pubis. Sagittal and coronal reconstructions were also obtained. Oral contrast medium:  None. Intravenous contrast medium:  Isovue-370, 100 mL. Comparison:  CT abdomen pelvis, January 13, 2020. Findings: Lungs:  Lung bases show emphysematous change with minimal to mild dependent subsegmental atelectatic change. Liver:  Normal in size, shape, and decreased in attenuation with focal fatty sparing. 5 mm cystlike area dome, right hepatic lobe (series 2, image 16). Bile Ducts:  Normal in caliber. Gallbladder:  Distended, with high attenuation sludge, or punctate calculi in dependent portion of gallbladder. No para cholecystic fluid. No gallbladder wall thickening. Pancreas:  Normal without masses, cysts, ductal dilatation or calcification. Spleen:  Lower limits of normal in size, unchanged, without masses or calcifications. No splenules. Kidneys:  Cranial caudal dimensions, right and left kidneys, measures 7.6 cm, and 7.2 cm, respectively. No hydronephrosis, masses, or stones. Adrenals:  Normal. Small bowel:  Normal in caliber. Appendix:  Normal. Colon:  Copious stool, rectal vault, with minimal wall thickening, and perirectal fat stranding. Diverticular change, sigmoid colon. Peritoneum:  No ascites, free air, or fluid collections.  Vessels: Aorta normal in course and caliber. Portal vein, splenic vein, superior mesenteric vein are patent. Lymph nodes:  Retroperitoneal:  No enlarged retroperitoneal lymph nodes. Mesenteric:  No enlarged mesenteric lymph nodes. Pelvic: No enlarged pelvic lymph nodes. Ureters: Normal in course and caliber. No calcifications. Bladder: No wall thickening. Reproductive organs: No pelvic masses. Abdominal Wall:  No hernia identified. No diastasis of rectus musculature. No edema or masses. Bones:  No bone lesions. Compression fracture, T9, unchanged. 6 mm anterolisthesis L4 on L5. 6 mm anterolisthesis L5 on S1. Diffuse disc space narrowing L5-S1. Diffuse disc space narrowing T9-10, through L2-L3. Lumbar scoliosis convexity to left apex L1-L2. Right femoral intramedullary zaina. Left femoral intramedullary zaina with femoral neck tension. Sigmoid diverticulosis. Fecal impaction with possible rectal stercoral colitis. Hepatic steatosis with focal fatty sparing. Spleen, lower limits of normal in size, unchanged. Small bilateral kidneys. Cholelithiasis versus high density gallbladder sludge. Severe degenerative change lumbar spine with scoliosis. Remote compression fracture, T9. Grade 1, L4, and grade one, L5, spondylolisthesis, unchanged. All CT scans at this facility use dose modulation, iterative reconstruction, and/or weight based dosing when appropriate to reduce radiation dose to as low as reasonably achievable. Xr Chest Portable    Result Date: 11/26/2020  EXAMINATION: XR CHEST PORTABLE CLINICAL HISTORY: HYPOXIA. RESPIRATORY FAILURE COMPARISONS: NOVEMBER 25, 2020, NOVEMBER 20, 2020 FINDINGS: Patient leaning to right. Low lung volumes. Osteopenia. Bilateral degenerative changes of the shoulders. Elevation right diaphragm unchanged. Ill-defined area of increased opacity right hilum, and right mid and lower lung. Left lung clear. RIGHT MID AND RIGHT LOWER LUNG ATELECTASIS/PNEUMONIA. FINDINGS UNCHANGED.     Mri Abdomen W Wo Contrast Mrcp    Result Date: 11/27/2020  EXAM: MRI ABDOMEN W WO CONTRAST MRCP HISTORY: Choledocholithiasis. TECHNIQUE: Multiplanar multisequence MRI of the abdomen was performed without and with contrast including 2-D and 3-D MRCP imaging. COMPARISON: Ultrasound from November 24, 2020 and CT of the abdomen and pelvis from November 22, 2020 FINDINGS: Examination is degraded by motion artifact. There appears to be an 8 mm filling defect within the distal common bile duct. A possible second smaller filling defect is questioned. The common bile duct is dilated measuring up to 11 mm in diameter. Mild intrahepatic biliary dilatation. The gallbladder contains numerous small gallstones. The gallbladder wall appears mildly thickened measuring approximately 4 mm in thickness. There is a small amount of pericholecystic fluid. There is a small amount of peripancreatic fluid about the pancreatic head/uncinate process. A small amount of fluid is also present along the duodenum. No enhancing pancreatic mass. The pancreatic duct is not dilated. 2 small hepatic cysts are identified. No enhancing liver lesion. The kidneys enhance uniformly. No hydronephrosis. No upper abdominal lymphadenopathy identified. Bone marrow signal appears normal. Small bilateral pleural effusions with adjacent atelectasis. One if not two stones within the distal common bile measuring up to 8 mm duct, resulting in dilation of the common bile duct up to 11 mm and mild intrahepatic biliary dilatation. Cholelithiasis, gallbladder wall thickening, and a small amount of pericholecystic fluid is concerning for acute cholecystitis. A small amount of fluid along the duodenum and head/uncinate process of the pancreas may be due to acute cholecystitis however correlation for pancreatitis is recommended. Us Abdomen Limited Specify Organ?  Liver, Gallbladder    Result Date: 11/24/2020  US ABDOMEN LIMITED, US DUP ABD PEL RETRO SCROT COMPLETE : 11/24/2020 CLINICAL HISTORY:  r/o cholecystitis, bile duct obstruction, portal vein thrombosis . COMPARISON: CT abdomen and pelvis with contrast 11/22/2020. TECHNIQUE: Transabdominal ultrasound of the right upper quadrant was performed, with color and waveform Doppler analysis. FINDINGS: The study is limited by the patient's high liver positioning and large amount of upper abdominal bowel gas. Numerous gallstones are noted within a mildly distended gallbladder, with borderline gallbladder wall thickening. There is mild predominantly extrahepatic biliary dilatation, with the common duct measuring approximately 1 cm in caliber at the mateo hepatis. With review of the previous CT, there is a suggestion of faintly dense distal common duct choledocholithiasis near the papilla. Further evaluation with MRCP is suggested, as clinically warranted. The visualized liver is unremarkable, with patent portal and hepatic veins demonstrating normal waveforms and flow direction. The hepatic artery is patent. There is no ascites or other findings of concern identified. EXTENSIVE CHOLELITHIASIS WITH FINDINGS SUGGESTIVE OF ACUTE CHOLECYSTITIS. MILD PREDOMINANTLY EXTRAHEPATIC BILIARY DILATATION, AND SUSPICION OF CHOLEDOCHOLITHIASIS. FURTHER EVALUATION WITH MRCP IS SUGGESTED; AS CLINICALLY WARRANTED. UNREMARKABLE RIGHT UPPER QUADRANT DOPPLER ULTRASOUND. Us Dup Abd Pel Retro Scrot Complete    Result Date: 11/24/2020  US ABDOMEN LIMITED, US DUP ABD PEL RETRO SCROT COMPLETE : 11/24/2020 CLINICAL HISTORY:  r/o cholecystitis, bile duct obstruction, portal vein thrombosis . COMPARISON: CT abdomen and pelvis with contrast 11/22/2020. TECHNIQUE: Transabdominal ultrasound of the right upper quadrant was performed, with color and waveform Doppler analysis. FINDINGS: The study is limited by the patient's high liver positioning and large amount of upper abdominal bowel gas.  Numerous gallstones are noted within a mildly distended gallbladder, with borderline gallbladder wall thickening. There is mild predominantly extrahepatic biliary dilatation, with the common duct measuring approximately 1 cm in caliber at the mateo hepatis. With review of the previous CT, there is a suggestion of faintly dense distal common duct choledocholithiasis near the papilla. Further evaluation with MRCP is suggested, as clinically warranted. The visualized liver is unremarkable, with patent portal and hepatic veins demonstrating normal waveforms and flow direction. The hepatic artery is patent. There is no ascites or other findings of concern identified. EXTENSIVE CHOLELITHIASIS WITH FINDINGS SUGGESTIVE OF ACUTE CHOLECYSTITIS. MILD PREDOMINANTLY EXTRAHEPATIC BILIARY DILATATION, AND SUSPICION OF CHOLEDOCHOLITHIASIS. FURTHER EVALUATION WITH MRCP IS SUGGESTED; AS CLINICALLY WARRANTED. UNREMARKABLE RIGHT UPPER QUADRANT DOPPLER ULTRASOUND. Assessment, plan:   1. Hypoxia, multifactorial associated with pulmonary fibrosis, diastolic heart failure associated with A. fib and RVR, in addition to immobility and retained secretions or atelectasis. This was evident on the x-ray done during the episode of hypoxia noted yesterday  2. Cholecystitis  3. Permanent atrial fibrillation  4. Mild pulmonary fibrosis etiology is not clear possible IPF but seems chronic and not progressive  5.  Atelectasis associated with debility  Continue oxygen supplement, will add a flutter valve, encourage deep breathing and cough, as needed bronchodilators, continue cardiac management per cardiology, will continue to follow        Electronically signed by Sarah Soto MD, [facility] on 11/27/2020 at 4:49 PM

## 2020-11-27 NOTE — PLAN OF CARE
Nutrition Problem #1: Increased nutrient needs  Intervention: Food and/or Nutrient Delivery: Continue Current Diet, Start Oral Nutrition Supplement(Continue Diet Dysphagia pureed with mildly thick liquids. Start nutritious pudding at breakfast, frozen ONS at lunch and dinner)  Nutritional Goals: Intake >75% of meals/supplements. Stable weight. Improved wound status.

## 2020-11-27 NOTE — PROGRESS NOTES
Report  Pain Assessment  Pain Assessment: 0-10  Pain Level: 4Patient's Stated Pain Goal: No pain  Pain Location: Abdomen  Pain Orientation: Lower  Pain Descriptors: Dull  Pain Frequency: Continuous         OBJECTIVE        Bed mobility  Rolling to Left: Moderate assistance  Supine to Sit: Moderate assistance  Sit to Supine: Moderate assistance  Scooting: Moderate assistance  Comment: HOB elevated, Transfered to left secondary to suction tube. Exercises  Straight Leg Raise: x10 with assist  Quad Sets: X15  Heelslides: X15  Hip Abduction: X15  Knee Short Arc Quad: X15  Ankle Pumps: and circles x 15  Comments: Sat at EOB 3 minutes min to mod assit, leans to left very forwad flexed posture. ASSESSMENT   Body structures, Functions, Activity limitations: Decreased functional mobility ; Decreased strength;Decreased endurance;Decreased posture;Decreased balance; Increased pain  Assessment: Pt exhibits decreased posture, increased SOB with minimal exertion, Well motivated. Prognosis: Good     Discharge Recommendations:  Continue to assess pending progress, Patient would benefit from continued therapy after discharge    Goals  Long term goals  Long term goal 1: Pt will demonstrate bed mobility indep  Long term goal 2: Pt will demonstrate transfers mod indep with rollator for return to prior level of function  Long term goal 3: Pt will demonstrate amb 150ft mod indep with rollator to allow pt to amb inside her home  Long term goal 4: Pt will demonstrate stair negotiation 4 steps with 2 rails mod indep to allow pt to enter and exit her home safely.     PLAN    Times per week: 3-6        [facility] (6 CLICK) Brett Hinton 28 Inpatient Mobility Raw Score : 13  Therapy Time   Individual   Time In 0935   Time Out 0955   Minutes 20      TE 13  CHRISTOFER Dooley [address] [place] [address], 11/27/20 at 10:02 AM    Electronically signed by Gustave Lennox, PTA on 11/27/2020 at 10:05 AM        Definitions for assistance levels  Independent = pt does not require any physical supervision or assistance from another person for activity completion. Device may be needed.   Stand by assistance = pt requires verbal cues or instructions from another person, close to but not touching, to perform the activity  Minimal assistance= pt performs 75% or more of the activity; assistance is required to complete the activity  Moderate assistance= pt performs 50% of the activity; assistance is required to complete the activity  Maximal assistance = pt performs 25% of the activity; assistance is required to complete the activity  Dependent = pt requires total physical assistance to accomplish the task

## 2020-11-27 NOTE — PROGRESS NOTES
[facility] Progress Note        Date:   2020    Patient:    Karissa Estrada    :    1933  CSN:    [id number]    Rounding Cardiologist: Guillermo Ann MD     Primary Cardiologist: Kimberli Weeks MD [address]  [facility]    Requesting Physician:  Joshua San MD      Reason for Consult:  Afib w RVR. Karissa Estrada is a pleasant 80 y.o. female with with  past cardiac and medical history including permanent atrial fibrillation on chronic Coumadin anticoagulation, mild aortic valve stenosis, preserved left ventricular function, pulmonary fibrosis, asthma who now presents with bilateral lower extremity weakness and back pain. Patient has had progression of her symptomatology over the last several years but more recently over the last month. She feels too weak to get up and do her normal chores. She cannot climb steps now. She lives alone. She came in for evaluation through the emergency room. She was noted to have rapid atrial fibrillation. INR was 3.3. Initial troponin was negative. Echocardiogram notes normal LV systolic function, LVEF 78% with evidence of diastolic dysfunction but no significant valvular heart disease ( Mild Moderate AS only). Laboratory data reviewed. Her liver enzymes are improving steadily, INR has decreased to 5.5 today, no active bleeding. As such, reasonable to continue amiodarone, daily EKG to follow QTC. GFR greater than 60. Hs-CRP is greater than 200. Last night she reverted back to atrial fibrillation, controlled ventricular rate. Her medications were reviewed. On examination she is alert oriented x3, laying flat in bed in no distress. Heart sounds are irregular, and breath sounds are diminished at the bases with scattered bibasilar crepitations. Abdomen is soft without guarding. INR is now normal.  Potassium is at target.   Assessment:  Patient with chronic atrial fibrillation, underwent successful electrical cardioversion on 2020, but reverted pharmacy. Prior to Admission medications    Medication Sig Start Date End Date Taking? Authorizing Provider   traMADol (ULTRAM) 50 MG tablet Take 50 mg by mouth every 8 hours as needed for Pain. Yes Historical Provider, MD   metoprolol tartrate (LOPRESSOR) 50 MG tablet Take 75 mg by mouth daily   Yes Historical Provider, MD   fluticasone-salmeterol (ADVAIR DISKUS) 250-50 MCG/DOSE AEPB Inhale 1 puff into the lungs 12/28/17  Yes Historical Provider, MD   warfarin (COUMADIN) 5 MG tablet Take 5 mg by mouth 7/27/17  Yes Historical Provider, MD       Scheduled Meds:   Vitamin D  2,000 Units Oral Dinner    cyanocobalamin  1,000 mcg Intramuscular Weekly    coenzyme Q10  100 mg Oral Daily    lidocaine  3 patch Transdermal Daily    metoprolol tartrate  12.5 mg Oral BID    sodium chloride flush  10 mL Intravenous 2 times per day    albumin human  50 g Intravenous BID    amiodarone  400 mg Oral BID    lactulose  30 g Oral BID    docusate sodium  100 mg Oral Daily    piperacillin-tazobactam  3.375 g Intravenous Q8H    sodium chloride flush  10 mL Intravenous 2 times per day     Continuous Infusions:    PRN Meds:sodium chloride flush, albuterol, magnesium hydroxide, bisacodyl, sodium chloride flush, acetaminophen **OR** acetaminophen, polyethylene glycol, promethazine **OR** ondansetron, metoprolol    Allergies   Allergen Reactions    Carbamazepine And Analogs     Codeine            Family History   Problem Relation Age of Onset    High Blood Pressure Mother     Osteoporosis Mother     Other Brother        Review Of Systems:    14 point ROS negative other than mentioned.      Physical Exam:    CURRENT VITALS: /74   Pulse 97   Temp 97.6 °F (36.4 °C) (Oral)   Resp 20   Ht 5' 4\" (1.626 m)   Wt 130 lb 3.2 oz (59.1 kg) Comment: bed may need zeroed  LMP  (LMP Unknown)   SpO2 94%   BMI 22.35 kg/m²       Labs:  Recent Results (from the past 24 hour(s))   EKG 12 Lead    Collection Time: 11/26/20  6:24 PM   Result Value Ref Range    Ventricular Rate 90 BPM    Atrial Rate 90 BPM    P-R Interval 210 ms    QRS Duration 80 ms    Q-T Interval 368 ms    QTc Calculation (Bazett) 450 ms    P Axis 62 degrees    R Axis -20 degrees    T Axis 183 degrees   EKG 12 Lead    Collection Time: 11/26/20  8:26 PM   Result Value Ref Range    Ventricular Rate 96 BPM    Atrial Rate 394 BPM    QRS Duration 82 ms    Q-T Interval 350 ms    QTc Calculation (Bazett) 442 ms    R Axis -21 degrees    T Axis 180 degrees   CBC Auto Differential    Collection Time: 11/27/20  6:07 AM   Result Value Ref Range    WBC 8.6 4.8 - 10.8 K/uL    RBC 3.57 (L) 4.20 - 5.40 M/uL    Hemoglobin 11.1 (L) 12.0 - 16.0 g/dL    Hematocrit 34.6 (L) 37.0 - 47.0 %    MCV 97.0 82.0 - 100.0 fL    MCH 31.0 27.0 - 31.3 pg    MCHC 31.9 (L) 33.0 - 37.0 %    RDW 15.2 (H) 11.5 - 14.5 %    Platelets 980 410 - 918 K/uL    Neutrophils % 83.7 %    Lymphocytes % 7.7 %    Monocytes % 8.4 %    Eosinophils % 0.1 %    Basophils % 0.1 %    Neutrophils Absolute 7.2 (H) 1.4 - 6.5 K/uL    Lymphocytes Absolute 0.7 (L) 1.0 - 4.8 K/uL    Monocytes Absolute 0.7 0.2 - 0.8 K/uL    Eosinophils Absolute 0.0 0.0 - 0.7 K/uL    Basophils Absolute 0.0 0.0 - 0.2 K/uL   Comprehensive Metabolic Panel    Collection Time: 11/27/20  6:07 AM   Result Value Ref Range    Sodium 141 135 - 144 mEq/L    Potassium 4.3 3.4 - 4.9 mEq/L    Chloride 107 95 - 107 mEq/L    CO2 22 20 - 31 mEq/L    Anion Gap 12 9 - 15 mEq/L    Glucose 76 70 - 99 mg/dL    BUN 27 (H) 8 - 23 mg/dL    CREATININE 0.77 0.50 - 0.90 mg/dL    GFR Non-African American >60.0 >60    GFR  >60.0 >60    Calcium 9.4 8.5 - 9.9 mg/dL    Total Protein 6.3 6.3 - 8.0 g/dL    Alb 4.3 3.5 - 4.6 g/dL    Total Bilirubin 1.6 (H) 0.2 - 0.7 mg/dL    Alkaline Phosphatase 73 40 - 130 U/L    ALT 23 0 - 33 U/L    AST 21 0 - 35 U/L    Globulin 2.0 (L) 2.3 - 3.5 g/dL   Magnesium    Collection Time: 11/27/20  6:07 AM   Result Value Ref Range    Magnesium 2.0 1.7 - 2.4 mg/dL   PROTIME-INR    Collection Time: 11/27/20  6:07 AM   Result Value Ref Range    Protime 25.9 (H) 12.3 - 14.9 sec    INR 2.4    TYPE AND SCREEN    Collection Time: 11/27/20 12:28 PM   Result Value Ref Range    ABO/Rh O POS     Antibody Screen NEG        ECG:     SR, with APCs / VPCs, 70s. TELEMETRY:  Afib / Flutter , 105 range, now post [facility] in SR 70s. ECHO:     1. Abnormal echocardiogram.    2. Normal left ventricular systolic function, LVEF 65 %.    3. Moderate Aortic valve stenosis with estimated aortic valve area 1.1    cm2.    4. Moderate AI, MR.    5. Moderate Left ventricular hypertrophy.    6. Moderate Bi atrial enlargement.    7. Normal pericardium. Dr Pamela Corral's Last Office Note:    Subjective  PCP: Yanelis   Subjective: Mrs. Renata Watson follows here for mild-moderate aortic stenosis and permanent atrial fibrillation. She is on chronic anticoagulation with Coumadin managed by at [facility] [employer] [facility] clinic. Last echocardiogram just 1 year ago with 65% ejection fraction mild aortic stenosis with mean gradient 9 mmHg peak velocity 1.8 m/s RVSP 43. Radha Dempsey When last seen in March she had been doing quite well though does lead a fairly sedentary lifestyle. Lab work 6/30/2020 CBC normal INR that particular day 3.5 CMP remarkable only for albumin mildly decreased 3.3 glucose 102 creatinine normal 0.91 electrolytes     She denies chest pain, chest pressure, dyspnea, palpitations, lightheadedness, syncope, pnd and orthopnea. She has not recently been hospitalized or seen in an ER. Is mention that perhaps once a month she will have slight lightheadedness if she gets up too fast particularly if she has been sitting on the toilet. She does say however that she does not always maintain adequate hydration and does not always eat regularly. She has had no syncope. She has had no bleeding on anticoagulation and she has no sensation of her atrial fibrillation    Impression  1.  Permanent atrial fibrillation: Rate is well controlled at rest. She has no symptoms of CHF to suggest excess rates with activity. She is had no bleeding on anticoagulation this [facility] continue same. Although blood pressure is on the low side usually she says it is 110-120. I suspect some of the relatively low pressures are related to inadequate p.o. intake. Could consider decreasing metoprolol but she said she felt much better when I increased it from 50-75 because of better control of heart rate. For now continue current medical regimen    2. Stenosis: Mild stenosis at echo 1 year ago the likelihood of progression to significant disease at her age is extraordinarily low and probably 0    3. Dizziness: Almost certainly due to volume depletion given that it only occurs once a month and she does describe relatively frequently actually poor p.o. intake and somewhat erratic eating habits. Courage to maintain hydration    Chief Complaint  6 month follow up   Chief Complaint Free Text:   An interactive audio and video telecommunication system which permitted real time communication was used with the patient consent to complete today's visit. Active Problems   1. Anxiety disorder (300.00) (F41.9)   2. Aortic stenosis (424.1) (I35.0)   · 10/11/2019 echo. LVEF 65%. Moderate LVH. Biatrial enlargement. Moderate aortic      stenosis peak/mean gradients 13/9 mmHg. Peak velocity 1.8 m/s. RVSP 43 mmHg   MAY 2017 echocardiogram: LVEF 66%. Biatrial enlargement. Mild RV dilatation. Aortic      valve area 1.6 cm². Peak/mean gradient 14/7 mmHg. RVSP 31 mm   3. Asthma (493.90) (J45.909)   4. Chronic GERD (530.81) (K21.9)   5. Depression (311) (F32.9)   6. Dizziness (780.4) (R42)   7. High risk medication use (V58.69) (Z79.899)   8. Hyperlipidemia (272.4) (E78.5)   9. Hypothyroidism (244.9) (E03.9)   10. Normal cardiac stress test   · 10/11/2019. Lexiscan perfusion. Normal perfusion. 66% LVEF   11.  Overweight (278.02) (E66.3) Recorded    Meds reviewed with daughter with list on her phone, via phone call. ANoble, LPN     Allergies   1. codeine   Hallucinations;; Updated By: Karlos Pradhan; 10/22/2019 1:25:31 PM   2.  TEGretol TABS   also had abnormal blood levels; Rash; Recorded By: Karlos Pradhan; 10/22/2019 1:25:32 PM

## 2020-11-27 NOTE — PROGRESS NOTES
Subjective: The patient complains of severe  chronic progresive generalized weakness partially relieved by rest, IV hydration, PT, OT and meds and exacerbated by exertion and recent illness. I am concerned about her sacral decub her external female catheter risk for neurogenic bowel and bladder and extremely low blood pressure. Reviewed recent MD note, \"  Pt flipped into Afib/currently controlled rate in 80-90's. EKG preformed to confirm. HS meds admin as prescribed. Dr. Candi Amezcua notified/no new orders at this time \". She seems to have atelectasis and pneumonia--much better since diuresis . She is still having GI discomfort and is considering gallbladder surgery. ROS x10: The patient also complains of severely impaired mobility and activities of daily living. Otherwise no new problems with vision, hearing, nose, mouth, throat, dermal, cardiovascular, GI, , pulmonary, musculoskeletal, psychiatric or neurological. See Rehab consult on Rehab chart . Vital signs:  BP 98/73   Pulse 84   Temp 97.8 °F (36.6 °C) (Oral)   Resp 15   Ht 5' 4\" (1.626 m)   Wt 130 lb 3.2 oz (59.1 kg) Comment: bed may need zeroed  LMP  (LMP Unknown)   SpO2 96%   BMI 22.35 kg/m²   I/O:   PO/Intake:    fair PO intake,   Was NPO-->clear liquids    Bowel/Bladder:    Severe constipation improving  General:  Patient is well developed, adequately nourished, non-obese and     well kempt. HEENT:    PERRLA, hearing intact to loud voice, external inspection of ear     and nose benign. Inspection of lips, tongue and gums benign  Musculoskeletal: No significant change in strength or tone. All joints stable. Inspection and palpation of digits and nails show no clubbing,       cyanosis or inflammatory conditions. Neuro/Psychiatric: Affect: flat-  Alert and oriented to self and     Situation . No significant change in deep tendon reflexes or     sensation  Lungs:  Diminished, CTA-B.  Respiration effort is normal at rest. Heart:   S1 = S2,   Afib w RVR at  times. No loud murmurs. Abdomen:  Soft,  RUQ-tender, no enlargement of liver or spleen. Extremities:  No significant lower extremity edema or tenderness. Skin:    BUE bruises dt blood draws,  3  Sacral decubiti    Rehabilitation:    tx held dt low BP    Physical therapy: FIMS:  Bed Mobility: Scooting: Moderate assistance    Transfers: Sit to Stand: Moderate Assistance  Stand to sit: Moderate Assistance,  ,      FIMS:  ,  , Assessment: Pt exhibits decreased posture, increased SOB with minimal exertion, fear of movement, decreased activity tolerance and increased assistance for all functional mobility requiring continued therapy for safe return home alone. Occupational therapy: FIMS:   ,  , Assessment: Pt. is an 80year old woman from home alone who presents to [facility] with the above deficits which impact her ability to perform ADLs and IADLs. Pt would benefit from continued OT to maximize independence and safety with ADL tasks.     Speech therapy: FIMS:        Lab/X-ray studies reviewed, analyzed and discussed with patient and staff:   Recent Results (from the past 24 hour(s))   EKG 12 Lead    Collection Time: 11/26/20  6:24 PM   Result Value Ref Range    Ventricular Rate 90 BPM    Atrial Rate 90 BPM    P-R Interval 210 ms    QRS Duration 80 ms    Q-T Interval 368 ms    QTc Calculation (Bazett) 450 ms    P Axis 62 degrees    R Axis -20 degrees    T Axis 183 degrees   EKG 12 Lead    Collection Time: 11/26/20  8:26 PM   Result Value Ref Range    Ventricular Rate 96 BPM    Atrial Rate 394 BPM    QRS Duration 82 ms    Q-T Interval 350 ms    QTc Calculation (Bazett) 442 ms    R Axis -21 degrees    T Axis 180 degrees   CBC Auto Differential    Collection Time: 11/27/20  6:07 AM   Result Value Ref Range    WBC 8.6 4.8 - 10.8 K/uL    RBC 3.57 (L) 4.20 - 5.40 M/uL    Hemoglobin 11.1 (L) 12.0 - 16.0 g/dL    Hematocrit 34.6 (L) 37.0 - 47.0 %    MCV 97.0 82.0 - 100.0 fL    MCH 31.0 27.0 - 31.3 pg    MCHC 31.9 (L) 33.0 - 37.0 %    RDW 15.2 (H) 11.5 - 14.5 %    Platelets 544 011 - 318 K/uL    Neutrophils % 83.7 %    Lymphocytes % 7.7 %    Monocytes % 8.4 %    Eosinophils % 0.1 %    Basophils % 0.1 %    Neutrophils Absolute 7.2 (H) 1.4 - 6.5 K/uL    Lymphocytes Absolute 0.7 (L) 1.0 - 4.8 K/uL    Monocytes Absolute 0.7 0.2 - 0.8 K/uL    Eosinophils Absolute 0.0 0.0 - 0.7 K/uL    Basophils Absolute 0.0 0.0 - 0.2 K/uL   Comprehensive Metabolic Panel    Collection Time: 11/27/20  6:07 AM   Result Value Ref Range    Sodium 141 135 - 144 mEq/L    Potassium 4.3 3.4 - 4.9 mEq/L    Chloride 107 95 - 107 mEq/L    CO2 22 20 - 31 mEq/L    Anion Gap 12 9 - 15 mEq/L    Glucose 76 70 - 99 mg/dL    BUN 27 (H) 8 - 23 mg/dL    CREATININE 0.77 0.50 - 0.90 mg/dL    GFR Non-African American >60.0 >60    GFR  >60.0 >60    Calcium 9.4 8.5 - 9.9 mg/dL    Total Protein 6.3 6.3 - 8.0 g/dL    Alb 4.3 3.5 - 4.6 g/dL    Total Bilirubin 1.6 (H) 0.2 - 0.7 mg/dL    Alkaline Phosphatase 73 40 - 130 U/L    ALT 23 0 - 33 U/L    AST 21 0 - 35 U/L    Globulin 2.0 (L) 2.3 - 3.5 g/dL   Magnesium    Collection Time: 11/27/20  6:07 AM   Result Value Ref Range    Magnesium 2.0 1.7 - 2.4 mg/dL   PROTIME-INR    Collection Time: 11/27/20  6:07 AM   Result Value Ref Range    Protime 25.9 (H) 12.3 - 14.9 sec    INR 2.4        Previous extensive, complex labs, notes and diagnostics reviewed and analyzed. ALLERGIES:    Allergies as of 11/22/2020 - Review Complete 11/22/2020   Allergen Reaction Noted    Carbamazepine and analogs  09/13/2010    Codeine  09/13/2010      (please also verify by checking MAR)     Discussed starting vitamins with her and her dtr. Complex Physical Medicine & Rehab Issues Assess & Plan:   1. Severe abnormality of gait and mobility and impaired self-care and ADL's secondary to progressive lumbar spinal stenosis and neurogenic bowel and bladder.   Functional and medical status reassessed regarding patients ability to participate in therapies and patient found to be able to participate in  acute intensive comprehensive inpatient rehabilitation program including PT/OT to improve balance, ambulation, ADLs, and to improve the P/AROM. It is my opinion that they will be able to tolerate 3 hours of therapy a day and benefit from it at an acute level. 2. Bowel fecal impaction constipation and Bladder dysfunction atonic bladder:  frequent toileting, ambulate to bathroom with assistance, check post void residuals. Check for C.difficile x1 if >2 loose stools in 24 hours, continue bowel & bladder program.  Monitor for UTI symptoms including lethargy and confusion  3. Severe thoracic and low back with multiple compression fractures and generalized OA pain: reassess pain every shift and prior to and after each therapy session, give prn  Tylenol, modalities prn in therapy, consider Lidoderm, K-pad prn.   4. Skin breakdown sacrum: Strict side-to-side turns consider plexus mattress continue pressure relief program.  Daily skin exams and reports from nursing. Plexus mattress. 5. Severe fatigue due to immobility and nutritional deficits: Add vitamin B12 vitamin D and CoQ10 titrate dosing and add protein supplementation with low carb content. Patient getting IV albumin. 6. Complex discharge planning:   Await medical stability-GI testing and BP correction--discussed at length with medical and cardio. Add Vit d, B12...coq10 etc. await final plans regarding her cardiac gallbladder interventions. Complex Active General Medical Issues that complicate care Assess & Plan:     1.  Active Problems:    Atrial fibrillation with rapid ventricular response (HCC)    Gait abnormality    DANNY (generalized anxiety disorder)    GERD (gastroesophageal reflux disease)    Hyperlipidemia    Major depressive disorder    Osteoporosis    Recurrent major depressive disorder, in full remission ([facility] [address]    Vitamin D deficiency    Sepsis due to urinary tract infection (HCC)    Pressure injury of skin of contiguous region involving back, buttock, and hip    Constipation, acute    LFT elevation    Hyperkalemia  Resolved Problems:    * No resolved hospital problems.  Magdalena Kumar D.O., PM&R     Attending    [facility]

## 2020-11-28 NOTE — PROGRESS NOTES
Hospitalist Progress Note      PCP: Rachel Yeager    Date of Admission: 11/22/2020    Chief Complaint: weakness    Subjective: pt awake, alert     Medications:  Reviewed    Infusion Medications   Scheduled Medications    potassium chloride  40 mEq Intravenous Once    polyethylene glycol  17 g Oral BID    senna  5 mL Oral BID    amiodarone  200 mg Oral BID    Vitamin D  2,000 Units Oral Dinner    cyanocobalamin  1,000 mcg Intramuscular Weekly    coenzyme Q10  100 mg Oral Daily    lidocaine  3 patch Transdermal Daily    metoprolol tartrate  12.5 mg Oral BID    sodium chloride flush  10 mL Intravenous 2 times per day    albumin human  50 g Intravenous BID    lactulose  30 g Oral BID    docusate sodium  100 mg Oral Daily    piperacillin-tazobactam  3.375 g Intravenous Q8H    sodium chloride flush  10 mL Intravenous 2 times per day     PRN Meds: sodium chloride flush, albuterol, magnesium hydroxide, bisacodyl, sodium chloride flush, acetaminophen **OR** acetaminophen, polyethylene glycol, promethazine **OR** ondansetron, metoprolol    No intake or output data in the 24 hours ending 11/28/20 1254    Exam:    /72   Pulse 90   Temp 98 °F (36.7 °C)   Resp 19   Ht 5' 4\" (1.626 m)   Wt 130 lb 3.2 oz (59.1 kg) Comment: bed may need zeroed  LMP  (LMP Unknown)   SpO2 97%   BMI 22.35 kg/m²     General appearance: No apparent distress, appears stated age and cooperative. HEENT: Pupils equal, round, and reactive to light. Conjunctivae/corneas clear. Neck: Supple, with full range of motion. No jugular venous distention. Trachea midline. Respiratory: tachypnea . Clear to auscultation, bilaterally without Rales/Wheezes/Rhonchi. Cardiovascular: Regular rate and rhythm with normal S1/S2 without murmurs, rubs or gallops. Abdomen: Soft, non-tender, non-distended with normal bowel sounds. Musculoskeletal: No clubbing, cyanosis or edema bilaterally. Full range of motion without deformity.   Skin: Skin color, texture, turgor normal.  No rashes or lesions. Neurologic:  Neurovascularly intact without any focal sensory/motor deficits. Cranial nerves: II-XII intact, grossly non-focal.  Psychiatric: Alert and oriented, thought content appropriate, normal insight        Labs:   Recent Labs     11/26/20  0602 11/27/20  0607 11/28/20  0620 11/28/20  0753   WBC 8.5 8.6 7.4  --    HGB 10.8* 11.1* 11.0* 12.4   HCT 33.5* 34.6* 34.0*  --     156 162  --      Recent Labs     11/26/20  0602 11/27/20  0607 11/28/20  0621 11/28/20  0753    141 147*  --    K 4.0 4.3 3.5  --    * 107 108*  --    CO2 20 22 23  --    BUN 28* 27* 28*  --    CREATININE 0.80 0.77 0.78 0.9   CALCIUM 8.9 9.4 9.8  --      Recent Labs     11/26/20  0602 11/27/20  0607 11/28/20  0621   AST 37* 21 13   ALT 39* 23 15   BILITOT 1.7* 1.6* 1.5*   ALKPHOS 96 73 58     Recent Labs     11/26/20  0603 11/27/20  0607 11/28/20  0620   INR 5.5* 2.4 2.4     No results for input(s): Jenna Toan in the last 72 hours. Urinalysis:      Lab Results   Component Value Date    NITRU POSITIVE 11/22/2020    WBCUA 6-9 11/22/2020    BACTERIA MANY 11/22/2020    RBCUA 3-5 11/22/2020    BLOODU TRACE 11/22/2020    SPECGRAV 1.020 11/22/2020    GLUCOSEU Negative 11/22/2020       Radiology:  XR CHEST PORTABLE   Final Result   RIGHT MID AND RIGHT LOWER LUNG ATELECTASIS/PNEUMONIA. FINDINGS UNCHANGED. MRI ABDOMEN W WO CONTRAST MRCP   Final Result      One if not two stones within the distal common bile measuring up to 8 mm duct, resulting in dilation of the common bile duct up to 11 mm and mild intrahepatic biliary dilatation. Cholelithiasis, gallbladder wall thickening, and a small amount of pericholecystic fluid is concerning for acute cholecystitis. A small amount of fluid along the duodenum and head/uncinate process of the pancreas may be due to acute cholecystitis however correlation for pancreatitis is recommended.                XR CHEST (2 VW)   Final Result   Low inspiratory lung show probable retrocardiac and chronic increase and show markings in both lungs. There are no large infiltrates or consolidations. US ABDOMEN LIMITED Specify organ? LIVER, GALLBLADDER   Final Result      EXTENSIVE CHOLELITHIASIS WITH FINDINGS SUGGESTIVE OF ACUTE CHOLECYSTITIS. MILD PREDOMINANTLY EXTRAHEPATIC BILIARY DILATATION, AND SUSPICION OF CHOLEDOCHOLITHIASIS. FURTHER EVALUATION WITH MRCP IS SUGGESTED; AS CLINICALLY WARRANTED. UNREMARKABLE RIGHT UPPER QUADRANT DOPPLER ULTRASOUND. US DUP ABD PEL RETRO SCROT COMPLETE   Final Result      EXTENSIVE CHOLELITHIASIS WITH FINDINGS SUGGESTIVE OF ACUTE CHOLECYSTITIS. MILD PREDOMINANTLY EXTRAHEPATIC BILIARY DILATATION, AND SUSPICION OF CHOLEDOCHOLITHIASIS. FURTHER EVALUATION WITH MRCP IS SUGGESTED; AS CLINICALLY WARRANTED. UNREMARKABLE RIGHT UPPER QUADRANT DOPPLER ULTRASOUND. RADIOLOGY REPORT   Final Result      XR CHEST (2 VW)   Final Result   SEVERE DEGENERATIVE CHANGE, BILATERAL SHOULDERS. THORACOLUMBAR SCOLIOSIS. BILATERAL LUNG CHANGES COMPATIBLE WITH CLINICAL HISTORY OF PULMONARY FIBROSIS. NO ACUTE FINDINGS. CT ABDOMEN PELVIS W IV CONTRAST Additional Contrast? None   Final Result      Sigmoid diverticulosis. Fecal impaction with possible rectal stercoral colitis. Hepatic steatosis with focal fatty sparing. Spleen, lower limits of normal in size, unchanged. Small bilateral kidneys. Cholelithiasis versus high density gallbladder sludge. Severe degenerative change lumbar spine with scoliosis. Remote compression fracture, T9. Grade 1, L4, and grade one, L5, spondylolisthesis, unchanged. All CT scans at this facility use dose modulation, iterative reconstruction, and/or weight based dosing when appropriate to reduce radiation dose to as low as reasonably achievable.                FL MODIFIED BARIUM SWALLOW W VIDEO    (Results Pending)           Assessment/Plan:    Active Hospital Problems    Diagnosis Date Noted    Sepsis due to urinary tract infection ([facility] [address] [A41.9, N39.0] 11/24/2020    Pressure injury of skin of contiguous region involving back, buttock, and hip [L89.40] 11/24/2020    Constipation, acute [K59.00] 11/24/2020    LFT elevation [R79.89] 11/24/2020    Hyperkalemia [E87.5] 11/24/2020    Gait abnormality [R26.9] 11/23/2020    DANNY (generalized anxiety disorder) [F41.1] 11/23/2020    GERD (gastroesophageal reflux disease) [K21.9] 11/23/2020    Hyperlipidemia [E78.5] 11/23/2020    Major depressive disorder [F32.9] 11/23/2020    Vitamin D deficiency [E55.9] 11/23/2020    Osteoporosis [M81.0] 11/23/2020    Atrial fibrillation with rapid ventricular response ([facility] [address] [I48.91] 11/22/2020    Recurrent major depressive disorder, in full remission ([facility] [address] [F33.42] 07/03/2018         DVT Prophylaxis: coumadin on hold  Diet: Dietary Nutrition Supplements: Other Oral Supplement (see comment)  Dietary Nutrition Supplements: Frozen Oral Supplement  DIET GENERAL; Dysphagia Minced and Moist; Mildly Thick (Nectar)  Code Status: Full Code    PT/OT Eval Status: done    Dispo - Dyspnea, acute respiratory failure- O2, resp Tx, pulmonary on case, BIPAP  Sepsis/cholecystitis- atbs per ID  General weakness-supportive care, needs placement at DC   PILY- resolved, follow up with BMP   A fib- post cardioversion, coumadin on hold  Cholecystitis with choledocholithiasis- pain controlled, ERCP on Monday per GI service   Pt is critically ill, overall poor prognosis.  Will follow along with consultants       Electronically signed by Stefani Ferris MD on 11/28/2020 at 12:54 PM

## 2020-11-28 NOTE — FLOWSHEET NOTE
Perfect serve message sent to Dr Sharath Garica regarding shift urine output of only 100ml. Awaiting reply.  Electronically signed by Christian Clay RN on 11/28/2020 at 6:56 PM

## 2020-11-28 NOTE — PROGRESS NOTES
[facility] OF THE [facility] [place] Note      Patient: Molina Duffy  Unit/Bed: B349/A045-46  YOB: 1933  MRN: [id number]  Admit Date:  11/22/2020  HOSPITAL day #     Whitney SAINI : Yanelis Mac   Primary cardiologist : Aleyda Omer   Subjective Complaint:  Patient's daughter is in the room. Patient has had bouts of shortness of breath. She has basically given up. She now requests no further procedures. I am told she is refusing medications as well. She was resting, and laying flat in bed. Her telemetry shows atrial fibrillation in the 90s. Reviewed with daughter. Daughter is a nurse. She understands the complexity of patient's problems. Choledocholithiasis abdominal discomfort? Sepsis chronic atrial fibrillation with increased ventricular rate, now rate is controlled, cardioversion did not sustain sinus rhythm she remains on high risk medication amiodarone. This is for rate control as of now. She is however refusing her medications and requesting palliative care. Discussed with RN as well. Per GI notes, ERCP is potentially scheduled for tomorrow. Remains on high risk medication warfarin.     Physical Examination:     /72   Pulse 90   Temp 98 °F (36.7 °C)   Resp 19   Ht 5' 4\" (1.626 m)   Wt 130 lb 3.2 oz (59.1 kg) Comment: bed may need zeroed  LMP  (LMP Unknown)   SpO2 97%   BMI 22.35 kg/m²     No intake or output data in the 24 hours ending 11/28/20 1243  Weights  Wt Readings from Last 3 Encounters:   11/26/20 130 lb 3.2 oz (59.1 kg)   02/17/20 155 lb (70.3 kg)   01/12/20 163 lb (73.9 kg)             LABS:   CBC:   Recent Labs     11/26/20  0602 11/27/20  0607 11/28/20  0620 11/28/20  0753   WBC 8.5 8.6 7.4  --    HGB 10.8* 11.1* 11.0* 12.4    156 162  --       BMP:    Recent Labs     11/26/20  0602 11/27/20  0607 11/28/20  0621 11/28/20  0753    141 147*  --    K 4.0 4.3 3.5  --    * 107 108*  --    CO2 20 22 23  --    BUN 28* 27* 28*  --    CREATININE 0.80 0.77 0.78 0.9

## 2020-11-28 NOTE — PROGRESS NOTES
[facility]  Facility/Department: [facility] TELEMETRY  Speech Language Pathology   Treatment Note          Mylene Light  7/13/1933  C617/B931-24    Medical Dx: Atrial fibrillation with rapid ventricular response [facility]) [I48.91]  Speech Dx: Dysphagia     11/28/2020    Subjective:  Alert, Cooperative, Pleasant, Motivated and Confused   New orders received, however, pt already on caseload. Pt refusing nectar thick liquids per RN Delfino Nieto. SLP asked pt why she was refusing and pt stated \"I have no idea\". Pt's daughter in room during session. Interventions used this date:  Dysphagia Treatment    Objective/Assessment:  Patient progressing towards goals:  Short-term Goals  Timeframe for Short-term Goals: 1 week  Goal 1: Pt will tolerate puree diet with mildly thick liquids with no overt s/s of difficulty or aspiration. Pt tolerated 3/3 bites of puree consistencies with no overt s/s of aspiration and mildly delayed swallow initiation. Pt tolerated 3/3 sips of nectar thick liquids via cup and 2/2 consecutive sips of nectar thick liquids via straw with no overt s/s of aspiration. Goal 2: Pt will tolerate therapeutic trials of minced and moist with no overt s/s of difficulty or aspiration on 100% trials. Pt tolerated 3/3 bites of minced and moist consistencies with no overt s/s of aspiration, adequate mastication, and oral clearance of bolus. Pt displayed mild lingual residue following trial x1, however, pt was able to clear residue with cued re-swallow. Goal 3: Pt will tolerate therapeutic trials of thin with no overt s/s of difficulty or aspiration on 100% trials. Pt tolerated 1/3 trials of thin liquids this date. Pt exhibited immediate throat clear and watery eyes following trials via cup. Pt exhibited wet vocal quality following trials of thin liquids via straw. Pt daughter reports that swallowing difficulty is a \"stress response\" that \"runs in the family\".  Pt's daughter also reported that pt has always \"cleared her throat\". Daughter stated that pt is \"fine\" and eats \"whatever\" at home. SLP provided education to daughter, who stated verbal understanding, however reinforcement is needed. SLP to recommend MBS in order to further assess pharyngeal phase of swallow and determine least restrictive diet. Goal 4: Pt will demonstrate prompt A-P transit with trials, with no oral holding on 90% trials. Pt demonstrated prompt AP transit this date following all trials, however, delayed swallow initiation was exhibited following all trials (noted through observation and palpation). The following goals to be added to ST POC:  1. Pt will tolerate recommended diet level with no overt s/s of aspiration and adequate oral clearance of bolus in all given opportunities. 2. Pt will tolerate 5/5 trials of soft and bite size consistencies with no overt s/s of aspiration in order to achieve least restrictive diet consistency. 3. Within 1-5 days, pt will participate in MBS study in order to more objectively assess pharyngeal phase of swallow and determine least restrictive diet consistency. SLP REC: Upgrade to minced and moist consistencies with nectar thick liquids. SLP REC: MBS in order to further assess pharyngeal phase of swallow and determine least restrictive diet consistency. SLP discussed all recommendations with MOHAN Salazar    Compensatory Swallowing Strategies: Upright as possible for all oral intake, Small bites/sips, Alternate solids and liquids      Treatment/Activity Tolerance:  Patient tolerated treatment well    Plan: Alter current POC (see above)    Pain Assessment:  Initial Assessment:  Patient denies pain. Re-assessment:  Patient denies pain. Patient/Caregiver Education:  Patient educated on session and progression towards goals. Caregiver education on session and progress towards goals. Patient stated verbal understanding of directions.   Caregiver stated verbal understanding of directions. Safety Devices:   All fall risk precautions in place, Bed alarm in place and Call light within reach and Daughter in room      Therapy Time   Time in: 1030   Time out: 1050   Total Minutes: 20     Signature: Electronically signed by ABAD Deleon-SLP on 11/28/2020 at 11:04 AM

## 2020-11-28 NOTE — PROGRESS NOTES
Patient refusing Bipap despite education and encouragement. States they have tried twice and will not try again.

## 2020-11-28 NOTE — FLOWSHEET NOTE
AM assessment completed. Patient resting in bed at this time. Denies any chest pain at this time. Remains atrial fibrillation on the monitor. No edema noted. Pedal pulses palpable. Denies any shortness of breath at this time. Lungs are diminished bilaterally. SATS 100% on 3L NC. She is A/Ox and can be up with a 1-2 person assist. She is able to turn herself in the bed. Denies any pain with elimination. Pure wick catheter to wall suction with light yolis colored hazy urine. Skin remains warm and pink. Redness/slight excoriation noted to seferino-area. Stage 2 wound noted to bilateral buttocks. #22g SL to left forearm, flushed and patent. Removed SL to right forearm secondary to site leaking, catheter intact and dressing applied. Vital signs stable and AM medications provided with applesauce. Call light remains in reach.  Electronically signed by Martha Lira RN on 11/28/2020 at 12:10 PM

## 2020-11-28 NOTE — PROGRESS NOTES
Gastroenterology Progress Note    Sivan Young is a 80 y.o. female patient. Hospitalization Day:6    Chief C/O: CBD stone    SUBJECTIVE: Seen and examined, tolerating diet, no abdominal pain, noted MRI with evidence of CBD stones with dilation up to 11 mm. ROS:  Gastrointestinal ROS: no abdominal pain, change in bowel habits, or black or bloody stools    Physical    VITALS:  /72   Pulse 90   Temp 98 °F (36.7 °C)   Resp 19   Ht 5' 4\" (1.626 m)   Wt 130 lb 3.2 oz (59.1 kg) Comment: bed may need zeroed  LMP  (LMP Unknown)   SpO2 97%   BMI 22.35 kg/m²   TEMPERATURE:  Current - Temp: 98 °F (36.7 °C); Max - Temp  Av.8 °F (36.6 °C)  Min: 97.6 °F (36.4 °C)  Max: 98 °F (36.7 °C)    General:  Alert and oriented,  No apparent distress  Skin- without jaundice  Eyes: anicteric sclera  Cardiac: RRR, Nl s1s2, without murmurs  Lungs CTA Bilaterally, normal effort  Abdomen soft, ND, NT, no HSM, Bowel sounds normal  Ext: without edema  Neuro: no asterixis     Data    Data Review:    Recent Labs     20  0620  0620  0620 20  0753   WBC 8.5 8.6 7.4  --    HGB 10.8* 11.1* 11.0* 12.4   HCT 33.5* 34.6* 34.0*  --    MCV 96.8 97.0 96.9  --     156 162  --      Recent Labs     20  0602 20  0607 20  0621 20  0753    141 147*  --    K 4.0 4.3 3.5  --    * 107 108*  --    CO2 20 22 23  --    BUN 28* 27* 28*  --    CREATININE 0.80 0.77 0.78 0.9     Recent Labs     20  0602 20  0607 20  0621   AST 37* 21 13   ALT 39* 23 15   BILITOT 1.7* 1.6* 1.5*   ALKPHOS 96 73 58     No results for input(s): LIPASE, AMYLASE in the last 72 hours.   Recent Labs     20  0603 20  0607 20  0620   PROTIME 49.1* 25.9* 25.7*   INR 5.5* 2.4 2.4       Radiology Review:  MRCP    ASSESSMENT:  40-year-old female with a history of a CBD stones, noted MRCP with evidence of choledocholithiasis with CBD of 11 mm, noted abnormal LFTs which are improving since arrival, INR is 2.4, Coumadin has been on hold, no clinical evidence of cholangitis. PLAN :  1.  Continue supportive course of care, IV fluids, antibiotics and pain management per primary team  2. Possible ERCP with Dr. Ina Hughes on Monday pending clinical course    Thank you for allowing me to participate in the care of your patient. Please feel free to contact me with any concerns.     Brad Figueroa, APRN - CNP

## 2020-11-28 NOTE — PROGRESS NOTES
Infectious Disease     Patient Name: Sunshine Vega  Date: 11/28/2020  YOB: 1933  Medical Record Number: [id number]            acute cholecystitis       Review of Systems   Constitutional: Negative. Respiratory: Negative. Cardiovascular: Negative. Gastrointestinal: Positive for abdominal pain. Skin: Positive for wound. Social History     Tobacco Use    Smoking status: Former Smoker     Packs/day: 0.25     Types: Cigarettes    Smokeless tobacco: Never Used    Tobacco comment: Quit smoking at 18yrs of age   Substance Use Topics    Alcohol use: Not Currently     Comment: occasional    Drug use: Not Currently         Past Medical History:   Diagnosis Date    Anticoagulated on Coumadin     Arthritis     Asthma     Atrial fibrillation ([facility] [address]     History of blood transfusion     Pneumonia            Past Surgical History:   Procedure Laterality Date    COLONOSCOPY      JOINT REPLACEMENT Bilateral     bilateral femur replacements         No current facility-administered medications on file prior to encounter. Current Outpatient Medications on File Prior to Encounter   Medication Sig Dispense Refill    traMADol (ULTRAM) 50 MG tablet Take 50 mg by mouth every 8 hours as needed for Pain.  metoprolol tartrate (LOPRESSOR) 50 MG tablet Take 75 mg by mouth daily      fluticasone-salmeterol (ADVAIR DISKUS) 250-50 MCG/DOSE AEPB Inhale 1 puff into the lungs      warfarin (COUMADIN) 5 MG tablet Take 5 mg by mouth         Allergies   Allergen Reactions    Carbamazepine And Analogs     Codeine          Family History   Problem Relation Age of Onset    High Blood Pressure Mother     Osteoporosis Mother     Other Brother          Physical Exam:      Physical Exam   Constitutional: She is oriented to person, place, and time. Cardiovascular: Normal heart sounds. No murmur heard. Pulmonary/Chest: Breath sounds normal. No respiratory distress. She has no wheezes.  She has no rales. She exhibits no tenderness. Abdominal: Soft. Bowel sounds are normal. She exhibits no distension and no mass. There is abdominal tenderness. There is no rebound and no guarding. Neurological: She is alert and oriented to person, place, and time. Skin: She is not diaphoretic. Blood pressure 109/84, pulse 100, temperature 97.8 °F (36.6 °C), temperature source Oral, resp. rate 17, height 5' 4\" (1.626 m), weight 130 lb 3.2 oz (59.1 kg), SpO2 98 %. .   Lab Results   Component Value Date    WBC 7.4 11/28/2020    HGB 12.4 11/28/2020    HCT 34.0 (L) 11/28/2020    MCV 96.9 11/28/2020     11/28/2020     Lab Results   Component Value Date     11/28/2020    K 3.5 11/28/2020    K 4.6 01/13/2020     11/28/2020    CO2 23 11/28/2020    BUN 28 11/28/2020    CREATININE 0.9 11/28/2020    CREATININE 0.78 11/28/2020    GLUCOSE 88 11/28/2020    CALCIUM 9.8 11/28/2020          CT of the Abdomen and Pelvis with intravenous contrast medium         History:  Left lower quadrant pain. Back pain. Left leg weakness.         Technical Factors:         CT imaging of the abdomen and pelvis were obtained and formatted as 5 mm contiguous axial images from the domes of the diaphragm to the symphysis pubis.  Sagittal and coronal reconstructions were also obtained.         Oral contrast medium:  None. Intravenous contrast medium:  Isovue-370, 100 mL.         Comparison:  CT abdomen pelvis, January 13, 2020.         Findings:         Lungs:  Lung bases show emphysematous change with minimal to mild dependent subsegmental atelectatic change.         Liver:  Normal in size, shape, and decreased in attenuation with focal fatty sparing. 5 mm cystlike area dome, right hepatic lobe (series 2, image 16).      Bile Ducts:  Normal in caliber.         Gallbladder:  Distended, with high attenuation sludge, or punctate calculi in dependent portion of gallbladder. No para cholecystic fluid.  No gallbladder wall thickening.         Pancreas:  Normal without masses, cysts, ductal dilatation or calcification.         Spleen:  Lower limits of normal in size, unchanged, without masses or calcifications.  No splenules.         Kidneys:  Cranial caudal dimensions, right and left kidneys, measures 7.6 cm, and 7.2 cm, respectively. No hydronephrosis, masses, or stones.         Adrenals:  Normal.         Small bowel:  Normal in caliber.         Appendix:  Normal.         Colon:  Copious stool, rectal vault, with minimal wall thickening, and perirectal fat stranding. Diverticular change, sigmoid colon.         Peritoneum:  No ascites, free air, or fluid collections.         Vessels:  Aorta normal in course and caliber.   Portal vein, splenic vein, superior mesenteric vein are patent.         Lymph nodes:           Retroperitoneal:  No enlarged retroperitoneal lymph nodes. Mesenteric:  No enlarged mesenteric lymph nodes. Pelvic: No enlarged pelvic lymph nodes.         Ureters: Normal in course and caliber. No calcifications.         Bladder: No wall thickening.         Reproductive organs: No pelvic masses.         Abdominal Wall:  No hernia identified.     No diastasis of rectus musculature.       No edema or masses.         Bones:  No bone lesions. Compression fracture, T9, unchanged. 6 mm anterolisthesis L4 on L5. 6 mm anterolisthesis L5 on S1. Diffuse disc space narrowing L5-S1. Diffuse disc space narrowing T9-10, through L2-L3. Lumbar scoliosis convexity to left apex L1-L2. Right femoral intramedullary zaina.  Left femoral intramedullary zaina with femoral neck tension.              Impression         Sigmoid diverticulosis.         Fecal impaction with possible rectal stercoral colitis.         Hepatic steatosis with focal fatty sparing.         Spleen, lower limits of normal in size, unchanged.         Small bilateral kidneys.         Cholelithiasis versus high density gallbladder sludge.         Severe degenerative change lumbar spine with scoliosis.         Remote compression fracture, T9.         Grade 1, L4, and grade one, L5, spondylolisthesis, unchanged. EXAM: MRI ABDOMEN W WO CONTRAST MRCP         HISTORY: Choledocholithiasis.         TECHNIQUE: Multiplanar multisequence MRI of the abdomen was performed without and with contrast including 2-D and 3-D MRCP imaging.         COMPARISON: Ultrasound from November 24, 2020 and CT of the abdomen and pelvis from November 22, 2020         FINDINGS:         Examination is degraded by motion artifact.         There appears to be an 8 mm filling defect within the distal common bile duct. A possible second smaller filling defect is questioned. The common bile duct is dilated measuring up to 11 mm in diameter. Mild intrahepatic biliary dilatation.         The gallbladder contains numerous small gallstones. The gallbladder wall appears mildly thickened measuring approximately 4 mm in thickness. There is a small amount of pericholecystic fluid.         There is a small amount of peripancreatic fluid about the pancreatic head/uncinate process. A small amount of fluid is also present along the duodenum. No enhancing pancreatic mass. The pancreatic duct is not dilated.         2 small hepatic cysts are identified. No enhancing liver lesion.         The kidneys enhance uniformly. No hydronephrosis.         No upper abdominal lymphadenopathy identified. Bone marrow signal appears normal.         Small bilateral pleural effusions with adjacent atelectasis.               Impression         One if not two stones within the distal common bile measuring up to 8 mm duct, resulting in dilation of the common bile duct up to 11 mm and mild intrahepatic biliary dilatation.         Cholelithiasis, gallbladder wall thickening, and a small amount of pericholecystic fluid is concerning for acute cholecystitis.         A small amount of fluid along the duodenum and head/uncinate process of the pancreas may be due to acute cholecystitis however correlation for pancreatitis is recommended.            ASSESSMENT:  PLAN:    acute cholecystitis   On Zosyn

## 2020-11-28 NOTE — FLOWSHEET NOTE
New #20g SL placed with 1 attempt for secondary access for IV antibiotics and IV KCl needed to be given. Patient tolerated well with no complaints.  Electronically signed by Amaury Wheeler RN on 11/28/2020 at 2:22 PM

## 2020-11-28 NOTE — PROGRESS NOTES
Subjective: The patient complains of severe  chronic progresive generalized weakness partially relieved by rest, IV hydration, PT, OT and meds and exacerbated by exertion and recent illness. I am concerned about her sacral decub her external female catheter risk for neurogenic bowel and bladder and extremely low blood pressure. Reviewed recent MD note, \"  Pt flipped into Afib/currently controlled rate in 80-90's. EKG preformed to confirm. HS meds admin as prescribed. Dr. Rudolph Trinidad notified/no new orders at this time \". She seems to have atelectasis and pneumonia--much better since diuresis . She is still having GI discomfort and is considering gallbladder surgery. Awaiting ERCP Monday and supportive care. Will transfer to rehab once testing complete. ROS x10: The patient also complains of severely impaired mobility and activities of daily living. Otherwise no new problems with vision, hearing, nose, mouth, throat, dermal, cardiovascular, GI, , pulmonary, musculoskeletal, psychiatric or neurological. See Rehab consult on Rehab chart . Vital signs:  /72   Pulse 90   Temp 98 °F (36.7 °C)   Resp 19   Ht 5' 4\" (1.626 m)   Wt 130 lb 3.2 oz (59.1 kg) Comment: bed may need zeroed  LMP  (LMP Unknown)   SpO2 97%   BMI 22.35 kg/m²   I/O:   PO/Intake:    fair PO intake,   Was NPO-->clear liquids    Bowel/Bladder:    Severe constipation improving  General:  Patient is well developed, adequately nourished, non-obese and     well kempt. HEENT:    PERRLA, hearing intact to loud voice, external inspection of ear     and nose benign. Inspection of lips, tongue and gums benign  Musculoskeletal: No significant change in strength or tone. All joints stable. Inspection and palpation of digits and nails show no clubbing,       cyanosis or inflammatory conditions. Neuro/Psychiatric: Affect: flat-  Alert and oriented to self and     Situation .   No significant change in deep tendon reflexes or     sensation  Lungs:  Diminished, CTA-B. Respiration effort is normal at rest.     Heart:   S1 = S2,   Afib w RVR at  times. No loud murmurs. Abdomen:  Soft,  RUQ-tender, no enlargement of liver or spleen. Extremities:  No significant lower extremity edema or tenderness. Skin:    BUE bruises dt blood draws,  3  Sacral decubiti    Rehabilitation:    tx held dt low BP    Physical therapy: FIMS:  Bed Mobility: Scooting: Moderate assistance    Transfers: Sit to Stand: Moderate Assistance  Stand to sit: Moderate Assistance,  ,      FIMS:  ,  , Assessment: Pt exhibits decreased posture, increased SOB with minimal exertion, Well motivated. Occupational therapy: FIMS:   ,  , Assessment: Pt. is an 80year old woman from home alone who presents to [facility] with the above deficits which impact her ability to perform ADLs and IADLs. Pt would benefit from continued OT to maximize independence and safety with ADL tasks.     Speech therapy: FIMS:        Lab/X-ray studies reviewed, analyzed and discussed with patient and staff:   Recent Results (from the past 24 hour(s))   CBC Auto Differential    Collection Time: 11/28/20  6:20 AM   Result Value Ref Range    WBC 7.4 4.8 - 10.8 K/uL    RBC 3.51 (L) 4.20 - 5.40 M/uL    Hemoglobin 11.0 (L) 12.0 - 16.0 g/dL    Hematocrit 34.0 (L) 37.0 - 47.0 %    MCV 96.9 82.0 - 100.0 fL    MCH 31.3 27.0 - 31.3 pg    MCHC 32.3 (L) 33.0 - 37.0 %    RDW 15.5 (H) 11.5 - 14.5 %    Platelets 017 360 - 599 K/uL    Neutrophils % 73.0 %    Lymphocytes % 13.0 %    Monocytes % 12.6 %    Eosinophils % 0.3 %    Basophils % 1.0 %    Neutrophils Absolute 5.5 1.4 - 6.5 K/uL    Lymphocytes Absolute 1.0 1.0 - 4.8 K/uL    Monocytes Absolute 1.0 (H) 0.2 - 0.8 K/uL    Eosinophils Absolute 0.0 0.0 - 0.7 K/uL    Basophils Absolute 0.1 0.0 - 0.2 K/uL    Myelocyte Percent 1 %    nRBC 1 /100 WBC    Anisocytosis 1+     Macrocytes 1+     Hypochromia 1+     Poikilocytes 1+     Target Cells 2+    PROTIME-INR Collection Time: 11/28/20  6:20 AM   Result Value Ref Range    Protime 25.7 (H) 12.3 - 14.9 sec    INR 2.4    Comprehensive Metabolic Panel    Collection Time: 11/28/20  6:21 AM   Result Value Ref Range    Sodium 147 (H) 135 - 144 mEq/L    Potassium 3.5 3.4 - 4.9 mEq/L    Chloride 108 (H) 95 - 107 mEq/L    CO2 23 20 - 31 mEq/L    Anion Gap 16 (H) 9 - 15 mEq/L    Glucose 88 70 - 99 mg/dL    BUN 28 (H) 8 - 23 mg/dL    CREATININE 0.78 0.50 - 0.90 mg/dL    GFR Non-African American >60.0 >60    GFR  >60.0 >60    Calcium 9.8 8.5 - 9.9 mg/dL    Total Protein 6.7 6.3 - 8.0 g/dL    Alb 4.9 (H) 3.5 - 4.6 g/dL    Total Bilirubin 1.5 (H) 0.2 - 0.7 mg/dL    Alkaline Phosphatase 58 40 - 130 U/L    ALT 15 0 - 33 U/L    AST 13 0 - 35 U/L    Globulin 1.8 (L) 2.3 - 3.5 g/dL   Magnesium    Collection Time: 11/28/20  6:21 AM   Result Value Ref Range    Magnesium 1.9 1.7 - 2.4 mg/dL   POCT Arterial    Collection Time: 11/28/20  7:53 AM   Result Value Ref Range    POC Sodium 143 136 - 145 mEq/L    POC Potassium 3.2 (L) 3.5 - 5.1 mEq/L    POC Chloride 107 99 - 110 mEq/L    POC Glucose 93 60 - 115 mg/dl    POC Creatinine 0.9 0.6 - 1.2 mg/dL    GFR Non- 59 (A) >60    GFR African American >60 >60    Calcium, Ion 1.34 (H) 1.12 - 1.32 mmol/L    pH, Arterial 7.287 (L) 7.350 - 7.450    pCO2, Arterial 59 (H) 35 - 45 mm Hg    pO2, Arterial 66 (HH) 75 - 108 mm Hg    HCO3, Arterial 28.0 21.0 - 29.0 mmol/L    Base Excess, Arterial 1 -3 - 3    O2 Sat, Arterial 90 (HH) 93 - 100 %    TCO2, Arterial 30 (H) 22 - 29    Lactate 0.94 0.40 - 2.00 mmol/L    POC Hematocrit 37 36 - 48 %    Hemoglobin 12.4 12.0 - 16.0 gm/dL    FIO2 3.000     Sample Type ART     Performed on SEE BELOW        Previous extensive, complex labs, notes and diagnostics reviewed and analyzed.      ALLERGIES:    Allergies as of 11/22/2020 - Review Complete 11/22/2020   Allergen Reaction Noted    Carbamazepine and analogs  09/13/2010    Codeine  09/13/2010 (please also verify by checking MAR)     Discussed starting vitamins with her and her dtr. Complex Physical Medicine & Rehab Issues Assess & Plan:   1. Severe abnormality of gait and mobility and impaired self-care and ADL's secondary to progressive lumbar spinal stenosis and neurogenic bowel and bladder. Functional and medical status reassessed regarding patients ability to participate in therapies and patient found to be able to participate in  acute intensive comprehensive inpatient rehabilitation program including PT/OT to improve balance, ambulation, ADLs, and to improve the P/AROM. It is my opinion that they will be able to tolerate 3 hours of therapy a day and benefit from it at an acute level. 2. Bowel fecal impaction constipation and Bladder dysfunction atonic bladder:  frequent toileting, ambulate to bathroom with assistance, check post void residuals. Check for C.difficile x1 if >2 loose stools in 24 hours, continue bowel & bladder program.  Monitor for UTI symptoms including lethargy and confusion  3. Severe thoracic and low back with multiple compression fractures and generalized OA pain: reassess pain every shift and prior to and after each therapy session, give prn  Tylenol, modalities prn in therapy, consider Lidoderm, K-pad prn.   4. Skin breakdown sacrum: Strict side-to-side turns consider plexus mattress continue pressure relief program.  Daily skin exams and reports from nursing. Plexus mattress. 5. Severe fatigue due to immobility and nutritional deficits: Add vitamin B12 vitamin D and CoQ10 titrate dosing and add protein supplementation with low carb content. Patient getting IV albumin. 6. Complex discharge planning:   Await medical stability-GI testing and BP correction--discussed at length with medical and cardio. Add Vit d, B12...coq10 etc. await final plans regarding her cardiac gallbladder interventions.     Complex Active General Medical Issues that complicate care Assess & Plan:     1. Active Problems:    Atrial fibrillation with rapid ventricular response (HCC)    Gait abnormality    DANNY (generalized anxiety disorder)    GERD (gastroesophageal reflux disease)    Hyperlipidemia    Major depressive disorder    Osteoporosis    Recurrent major depressive disorder, in full remission ([facility]    Vitamin D deficiency    Sepsis due to urinary tract infection ([facility]    Pressure injury of skin of contiguous region involving back, buttock, and hip    Constipation, acute    LFT elevation    Hyperkalemia  Resolved Problems:    * No resolved hospital problems.  Iban Verdugo D.O., PM&R     Attending    [address]

## 2020-11-29 NOTE — CONSULTS
Spiritual Care Services     Summary of Visit:  Consult was placed to discuss AD. Daughter and son-in-law were present in room during visit. Daughter expressed that she didn't think her mother could complete documents but that she thought her mother already had documents in place. Family to look for them today. Daughter expressed that they wanted to take a step back from talking hospice and DNR for today. She seemed a bit overwhelmed by the change in her mother from yesterday. Family  stopped by to visit and blessed the patient (she is not Christian). This  also offered a blessing (the patient woke and smiled at the blessing). Spiritual Assessment/Intervention/Outcomes:    Encounter Summary  Services provided to[de-identified] Patient and family together  Referral/Consult From[de-identified] Nurse  Support System: Children, Family members  Place of Temple: None  Continue Visiting: Yes  Complexity of Encounter: Moderate  Length of Encounter: 15 minutes  Spiritual Assessment Completed: Yes        Spiritual/Quaker  Type: Spiritual support  Assessment: Calm, Approachable, Passive, Unable to respond, Coping  Intervention: Explored feelings, thoughts, concerns, Nurtured hope, Prayer, Scripture, Discussed relationship with God, Discussed belief system/Pentecostal practices/grace, Discussed illness/injury and it's impact  Outcome: Expressed gratitude, Comfort, Engaged in conversation, Encouraged, Hopeful, Receptive        Advance Directives (For Healthcare)  Pre-existing DNR Comfort Care/DNR Arrest/DNI Order: No  Healthcare Directive:  Other (Comment)(Family to search patient's house.)  Information on Healthcare Directives Requested: No  Patient Requests Assistance: Other (Comment)(Not able to complete documents)  Advance Directives: Unable to complete           Values / Beliefs  Do you have any ethnic, cultural, sacramental, or spiritual Pentecostal needs you would like us to be aware of while you are in the hospital?: No    Care

## 2020-11-29 NOTE — PROGRESS NOTES
Subjective: The patient complains of severe  chronic progresive generalized weakness partially relieved by rest, IV hydration, PT, OT and meds and exacerbated by exertion and recent illness. I am concerned about her sacral decub her external female catheter risk for neurogenic bowel and bladder and extremely low blood pressure. Reviewed recent MD note, \"  Pt flipped into Afib/currently controlled rate in 80-90's. EKG preformed to confirm. HS meds admin as prescribed. Dr. Osman Alejandre notified/no new orders at this time \". She seems to have atelectasis and pneumonia--much better since diuresis . She is still having GI discomfort and is considering gallbladder surgery. Awaiting ERCP Monday and supportive care. Will transfer to rehab once testing complete. ROS x10: The patient also complains of severely impaired mobility and activities of daily living. Otherwise no new problems with vision, hearing, nose, mouth, throat, dermal, cardiovascular, GI, , pulmonary, musculoskeletal, psychiatric or neurological. See Rehab consult on Rehab chart . Vital signs:  BP (!) 95/55   Pulse 84   Temp 97.3 °F (36.3 °C) (Axillary)   Resp 20   Ht 5' 4\" (1.626 m)   Wt 124 lb 9.6 oz (56.5 kg) Comment: bed has been zeroed  LMP  (LMP Unknown)   SpO2 100%   BMI 21.39 kg/m²   I/O:   PO/Intake:    fair PO intake,   Was NPO-->clear liquids    Bowel/Bladder:    Severe constipation improving  General:  Patient is well developed, adequately nourished, non-obese and     well kempt. HEENT:    PERRLA, hearing intact to loud voice, external inspection of ear     and nose benign. Inspection of lips, tongue and gums benign  Musculoskeletal: No significant change in strength or tone. All joints stable. Inspection and palpation of digits and nails show no clubbing,       cyanosis or inflammatory conditions. Neuro/Psychiatric: Affect: flat-  Alert and oriented to self and     Situation .   No significant change in deep tendon reflexes or     sensation  Lungs:  Diminished, CTA-B. Respiration effort is normal at rest.     Heart:   S1 = S2,   Afib w RVR at  times. No loud murmurs. Abdomen:  Soft,  RUQ-tender, no enlargement of liver or spleen. Extremities:  No significant lower extremity edema or tenderness. Skin:    BUE bruises dt blood draws,  3  Sacral decubiti    Rehabilitation:    tx held dt low BP    Physical therapy: FIMS:  Bed Mobility: Scooting: Moderate assistance    Transfers: Sit to Stand: Moderate Assistance  Stand to sit: Moderate Assistance,  ,      FIMS:  ,  , Assessment: Pt exhibits decreased posture, increased SOB with minimal exertion, Well motivated. Occupational therapy: FIMS:   ,  , Assessment: Pt. is an 80year old woman from home alone who presents to [facility] with the above deficits which impact her ability to perform ADLs and IADLs. Pt would benefit from continued OT to maximize independence and safety with ADL tasks. Speech therapy: FIMS:        Lab/X-ray studies reviewed, analyzed and discussed with patient and staff:   No results found for this or any previous visit (from the past 24 hour(s)). Previous extensive, complex labs, notes and diagnostics reviewed and analyzed. ALLERGIES:    Allergies as of 11/22/2020 - Review Complete 11/22/2020   Allergen Reaction Noted    Carbamazepine and analogs  09/13/2010    Codeine  09/13/2010      (please also verify by checking MAR)     Discussed starting vitamins with her and her dtr. Complex Physical Medicine & Rehab Issues Assess & Plan:   1. Severe abnormality of gait and mobility and impaired self-care and ADL's secondary to progressive lumbar spinal stenosis and neurogenic bowel and bladder.   Functional and medical status reassessed regarding patients ability to participate in therapies and patient found to be able to participate in  acute intensive comprehensive inpatient rehabilitation program including PT/OT to improve balance, resolved hospital problems.  Rene Fields D.O., PM&R     Attending    [facility]

## 2020-11-29 NOTE — FLOWSHEET NOTE
AM assessment completed. Pt resting in bed at this time. Patient has no complaints of chest pain at this time and is atrial fibrillation on the monitor. Lung sounds were clear but diminished bilaterally. Patient SATS between 98-99% on 3 L O2 NC. Patient is A/O to place and person but not time. She is currently refusing meals and medications. Pt has 1+ pitting edema on the BLE. Patient's skin is cool, pink and dusky. Patient has some bruising on the LAURY. Pedal pulses were palpated. Patient has no complaints of pain with elimination. Bowel sounds were present in all four quadrants. Patient has a purewick with hazy, yolis colored urine. Patient has a #20 in the L wrist and a #20 in the L forearm. Both have been flushed and are patent. Vital signs are stable and the call light is within reach. I agree with the above student assessment. Patient is also refusing any medications at this time. I tried to talk with her in depth about care and treatment and the patient is still refusing to eat or take medications. She keeps talking about how she is going to die tomorrow. Dr Ariana Hendrickson notified of this assessment. He stated he will contact the daughter.  Electronically signed by Amaury Wheeler RN on 11/29/2020 at 12:57 PM

## 2020-11-29 NOTE — PROGRESS NOTES
Called in by nursing to come evaluate pt. She is tachypneic, in mild respiratory distress. She asked the nurses to stop her treatments and that she wanted to die. I discussed with her comfort measures only, and pt is requesting comfort care. She understands that comfort measures will result in her death. She is alert and oriented x3 and answering all questions appropriately. Will change code status to DNR CCA, consult hospice and paliative care. Pt denies any pain. She is refusing lab draws.  She is requesting thin liquids, and verbalizes her understanding that she can aspirate

## 2020-11-29 NOTE — PROGRESS NOTES
Hospitalist Progress Note      PCP: Ileene Fothergill    Date of Admission: 11/22/2020    Chief Complaint: weakness    Subjective: pt looks comfortable, tachypneic     Medications:  Reviewed    Infusion Medications   Scheduled Medications    polyethylene glycol  17 g Oral BID    senna  5 mL Oral BID    amiodarone  200 mg Oral BID    Vitamin D  2,000 Units Oral Dinner    cyanocobalamin  1,000 mcg Intramuscular Weekly    coenzyme Q10  100 mg Oral Daily    lidocaine  3 patch Transdermal Daily    metoprolol tartrate  12.5 mg Oral BID    sodium chloride flush  10 mL Intravenous 2 times per day    albumin human  50 g Intravenous BID    lactulose  30 g Oral BID    docusate sodium  100 mg Oral Daily    piperacillin-tazobactam  3.375 g Intravenous Q8H    sodium chloride flush  10 mL Intravenous 2 times per day     PRN Meds: melatonin, sodium chloride flush, albuterol, magnesium hydroxide, bisacodyl, sodium chloride flush, acetaminophen **OR** acetaminophen, polyethylene glycol, promethazine **OR** ondansetron, metoprolol      Intake/Output Summary (Last 24 hours) at 11/29/2020 0949  Last data filed at 11/28/2020 1757  Gross per 24 hour   Intake 840 ml   Output --   Net 840 ml       Exam:    /74   Pulse 68   Temp 98.1 °F (36.7 °C) (Oral)   Resp 18   Ht 5' 4\" (1.626 m)   Wt 124 lb 9.6 oz (56.5 kg) Comment: bed has been zeroed  LMP  (LMP Unknown)   SpO2 98%   BMI 21.39 kg/m²     General appearance: No apparent distress, appears stated age and cooperative. HEENT: Pupils equal, round, and reactive to light. Conjunctivae/corneas clear. Neck: Supple, with full range of motion. No jugular venous distention. Trachea midline. Respiratory:tachypnea  Clear to auscultation, bilaterally without Rales/Wheezes/Rhonchi. Cardiovascular: Regular rate and rhythm with normal S1/S2 without murmurs, rubs or gallops. Abdomen: Soft, non-tender, non-distended with normal bowel sounds.   Musculoskeletal: No clubbing, cyanosis or edema bilaterally. \  Skin: Skin color, texture, turgor normal.  No rashes or lesions. Neurologic:  Neurovascularly intact without any focal sensory/motor deficits. \  Psychiatric:partially Alert and oriented, \  Capillary Refill: Brisk,< 3 seconds   Peripheral Pulses: +2 palpable, equal bilaterally       Labs:   Recent Labs     11/27/20  0607 11/28/20  0620 11/28/20  0753   WBC 8.6 7.4  --    HGB 11.1* 11.0* 12.4   HCT 34.6* 34.0*  --     162  --      Recent Labs     11/27/20  0607 11/28/20  0621 11/28/20  0753    147*  --    K 4.3 3.5  --     108*  --    CO2 22 23  --    BUN 27* 28*  --    CREATININE 0.77 0.78 0.9   CALCIUM 9.4 9.8  --      Recent Labs     11/27/20  0607 11/28/20  0621   AST 21 13   ALT 23 15   BILITOT 1.6* 1.5*   ALKPHOS 73 58     Recent Labs     11/27/20  0607 11/28/20  0620 11/28/20  1343   INR 2.4 2.4 2.9     No results for input(s): Viridiana Lowers in the last 72 hours. Urinalysis:      Lab Results   Component Value Date    NITRU POSITIVE 11/22/2020    WBCUA 6-9 11/22/2020    BACTERIA MANY 11/22/2020    RBCUA 3-5 11/22/2020    BLOODU TRACE 11/22/2020    SPECGRAV 1.020 11/22/2020    GLUCOSEU Negative 11/22/2020       Radiology:  XR CHEST PORTABLE   Final Result   RIGHT MID AND RIGHT LOWER LUNG ATELECTASIS/PNEUMONIA. FINDINGS UNCHANGED. MRI ABDOMEN W WO CONTRAST MRCP   Final Result      One if not two stones within the distal common bile measuring up to 8 mm duct, resulting in dilation of the common bile duct up to 11 mm and mild intrahepatic biliary dilatation. Cholelithiasis, gallbladder wall thickening, and a small amount of pericholecystic fluid is concerning for acute cholecystitis. A small amount of fluid along the duodenum and head/uncinate process of the pancreas may be due to acute cholecystitis however correlation for pancreatitis is recommended.                XR CHEST (2 VW)   Final Result   Low inspiratory lung show probable retrocardiac and chronic increase and show markings in both lungs. There are no large infiltrates or consolidations. US ABDOMEN LIMITED Specify organ? LIVER, GALLBLADDER   Final Result      EXTENSIVE CHOLELITHIASIS WITH FINDINGS SUGGESTIVE OF ACUTE CHOLECYSTITIS. MILD PREDOMINANTLY EXTRAHEPATIC BILIARY DILATATION, AND SUSPICION OF CHOLEDOCHOLITHIASIS. FURTHER EVALUATION WITH MRCP IS SUGGESTED; AS CLINICALLY WARRANTED. UNREMARKABLE RIGHT UPPER QUADRANT DOPPLER ULTRASOUND. US DUP ABD PEL RETRO SCROT COMPLETE   Final Result      EXTENSIVE CHOLELITHIASIS WITH FINDINGS SUGGESTIVE OF ACUTE CHOLECYSTITIS. MILD PREDOMINANTLY EXTRAHEPATIC BILIARY DILATATION, AND SUSPICION OF CHOLEDOCHOLITHIASIS. FURTHER EVALUATION WITH MRCP IS SUGGESTED; AS CLINICALLY WARRANTED. UNREMARKABLE RIGHT UPPER QUADRANT DOPPLER ULTRASOUND. RADIOLOGY REPORT   Final Result      XR CHEST (2 VW)   Final Result   SEVERE DEGENERATIVE CHANGE, BILATERAL SHOULDERS. THORACOLUMBAR SCOLIOSIS. BILATERAL LUNG CHANGES COMPATIBLE WITH CLINICAL HISTORY OF PULMONARY FIBROSIS. NO ACUTE FINDINGS. CT ABDOMEN PELVIS W IV CONTRAST Additional Contrast? None   Final Result      Sigmoid diverticulosis. Fecal impaction with possible rectal stercoral colitis. Hepatic steatosis with focal fatty sparing. Spleen, lower limits of normal in size, unchanged. Small bilateral kidneys. Cholelithiasis versus high density gallbladder sludge. Severe degenerative change lumbar spine with scoliosis. Remote compression fracture, T9. Grade 1, L4, and grade one, L5, spondylolisthesis, unchanged. All CT scans at this facility use dose modulation, iterative reconstruction, and/or weight based dosing when appropriate to reduce radiation dose to as low as reasonably achievable.                        Assessment/Plan:    Active Hospital Problems Diagnosis Date Noted    Sepsis due to urinary tract infection ([facility] [A41.9, N39.0] 11/24/2020    Pressure injury of skin of contiguous region involving back, buttock, and hip [L89.40] 11/24/2020    Constipation, acute [K59.00] 11/24/2020    LFT elevation [R79.89] 11/24/2020    Hyperkalemia [E87.5] 11/24/2020    Gait abnormality [R26.9] 11/23/2020    DANNY (generalized anxiety disorder) [F41.1] 11/23/2020    GERD (gastroesophageal reflux disease) [K21.9] 11/23/2020    Hyperlipidemia [E78.5] 11/23/2020    Major depressive disorder [F32.9] 11/23/2020    Vitamin D deficiency [E55.9] 11/23/2020    Osteoporosis [M81.0] 11/23/2020    Atrial fibrillation with rapid ventricular response ([facility] [I48.91] 11/22/2020    Recurrent major depressive [facility], [facility] [F33.42] 07/03/2018         DVT Prophylaxis: hold coumadin  Diet: DIET GENERAL; Dysphagia Minced and Moist  Code Status: DNR-CCA    PT/OT Eval Status:     Dispo - Dyspnea, acute respiratory failure- O2, resp Tx, pulmonary on case, BIPAP  Sepsis/cholecystitis- atbs per ID  General weakness-supportive care, needs placement at DC   PILY- resolved, follow up with BMP   A fib- post cardioversion, coumadin on hold  Cholecystitis with choledocholithiasis- pain controlled, ERCP on Monday per GI service   Pt is critically ill, overall poor prognosis.  Will follow along with consultants     Today patient decided to have evaluated by Hospice for comfort care- looks obtuned today, was trying to call her daughter- left voice message regarding pt wishes- will follow up with family decision later today       Electronically signed by Won Barahona MD on 11/29/2020 at 9:49 AM

## 2020-11-29 NOTE — PROGRESS NOTES
Per Dr. Laura Barakat note and RN, Rocael Horan, pt is choosing to consume thin liquids and understands the risk of aspirating. Pt notified ST when she entered the room \"I am going to die. \" ST notified RN, Rocael Horan. ST discussed the reason for ST's visit with patient. MBS has been cancelled at this time and pt has been upgraded to minced, moist with thin liquids per pt choice and understanding of high risk of aspiration. Reorder ST if pt chooses to pursue MBS and/or thickened liquid recommendation.      Electronically signed by DAWIT Cohen on 11/29/2020 at 8:36 AM

## 2020-11-29 NOTE — CARE COORDINATION
PTS DTR IN WITH PATIENT, UPDATED ON CONDITION BY NURSING.  MET WITH PATIENT AND DTR/SADIA, FAMILY DOES NOT WANT TO DISCUSS HOSPICE/CODE STATUS ANY MORE TODAY. PT WAS CHANGED TO DNR CC, FAMILY WILL DISCUSS NEXT STEP.  LSW/CM TO ADDRESS TOMORROW. CARLOS FROM [facility] UPDATED.

## 2020-11-29 NOTE — FLOWSHEET NOTE
Patient daughter and son-in-law at bedside. Patient is in and out of sleep. She is agreeable to daughter making decisions for her care. Dr Ameya Howell notified of family wish for [facility]. Patient resting in bed with no signs of any acute distress at this time. Call light remains in reach.  Electronically signed by Carlos Sanchez RN on 11/29/2020 at 12:59 PM

## 2020-11-29 NOTE — CARE COORDINATION
DISCUSSED CASE WITH DR Celi Leonardo AND RN, PT WISHING TO STOP ALL TREATMENT AND BE COMFORTABLE. PER RN PT IS CONFUSED AT TIMES, HAS BEEN REFUSING MEDS AND LABS. DR Celi Leonardo LEFT VOICEMAIL FOR PTS DTR.

## 2020-11-29 NOTE — CARE COORDINATION
SPOKE TO DTR AMY, UPDATED ON PTS WISHES TO STOP TREATMENT. AMY VOICES CONCERNS AND IS WANTING TO COME SEE HER MOTHER AND POSSIBLY DISCUSS HOSPICE OPTIONS. PERMISSION GIVEN FOR DTR/SADIA TO VISIT DURING NON VISITING HRS D/T CIRCUMSTANCES. Wanda Sloan RN UPDATED AND SHE NOTIFIED DR Filippo Fox.

## 2020-11-29 NOTE — PROGRESS NOTES
Infectious Disease     Patient Name: Solomon Dean  Date: 11/29/2020  YOB: 1933  Medical Record Number: [id number]            acute cholecystitis       Review of Systems   Constitutional: Negative. Respiratory: Negative. Cardiovascular: Negative. Gastrointestinal: Positive for abdominal pain. Skin: Positive for wound. Physical Exam   Constitutional: She is oriented to person, place, and time. Cardiovascular: Normal heart sounds. No murmur heard. Pulmonary/Chest: Breath sounds normal. No respiratory distress. She has no wheezes. She has no rales. She exhibits no tenderness. Abdominal: Soft. Bowel sounds are normal. She exhibits no distension and no mass. There is abdominal tenderness. There is no rebound and no guarding. Neurological: She is alert and oriented to person, place, and time. Skin: She is not diaphoretic. Blood pressure 111/74, pulse 68, temperature 98.1 °F (36.7 °C), temperature source Oral, resp. rate 18, height 5' 4\" (1.626 m), weight 124 lb 9.6 oz (56.5 kg), SpO2 98 %. .   Lab Results   Component Value Date    WBC 7.4 11/28/2020    HGB 12.4 11/28/2020    HCT 34.0 (L) 11/28/2020    MCV 96.9 11/28/2020     11/28/2020     Lab Results   Component Value Date     11/28/2020    K 3.5 11/28/2020    K 4.6 01/13/2020     11/28/2020    CO2 23 11/28/2020    BUN 28 11/28/2020    CREATININE 0.9 11/28/2020    CREATININE 0.78 11/28/2020    GLUCOSE 88 11/28/2020    CALCIUM 9.8 11/28/2020          CT of the Abdomen and Pelvis with intravenous contrast medium         History:  Left lower quadrant pain. Back pain. Left leg weakness.         Technical Factors:         CT imaging of the abdomen and pelvis were obtained and formatted as 5 mm contiguous axial images from the domes of the diaphragm to the symphysis pubis.  Sagittal and coronal reconstructions were also obtained.         Oral contrast medium:  None.     Intravenous contrast medium:  Isovue-370, 100 mL.         Comparison:  CT abdomen pelvis, January 13, 2020.         Findings:         Lungs:  Lung bases show emphysematous change with minimal to mild dependent subsegmental atelectatic change.         Liver:  Normal in size, shape, and decreased in attenuation with focal fatty sparing. 5 mm cystlike area dome, right hepatic lobe (series 2, image 16).      Bile Ducts:  Normal in caliber.         Gallbladder:  Distended, with high attenuation sludge, or punctate calculi in dependent portion of gallbladder. No para cholecystic fluid. No gallbladder wall thickening.         Pancreas:  Normal without masses, cysts, ductal dilatation or calcification.         Spleen:  Lower limits of normal in size, unchanged, without masses or calcifications.  No splenules.         Kidneys:  Cranial caudal dimensions, right and left kidneys, measures 7.6 cm, and 7.2 cm, respectively. No hydronephrosis, masses, or stones.         Adrenals:  Normal.         Small bowel:  Normal in caliber.         Appendix:  Normal.         Colon:  Copious stool, rectal vault, with minimal wall thickening, and perirectal fat stranding. Diverticular change, sigmoid colon.         Peritoneum:  No ascites, free air, or fluid collections.         Vessels:  Aorta normal in course and caliber.   Portal vein, splenic vein, superior mesenteric vein are patent.         Lymph nodes:           Retroperitoneal:  No enlarged retroperitoneal lymph nodes. Mesenteric:  No enlarged mesenteric lymph nodes. Pelvic: No enlarged pelvic lymph nodes.         Ureters: Normal in course and caliber. No calcifications.         Bladder: No wall thickening.         Reproductive organs: No pelvic masses.         Abdominal Wall:  No hernia identified.     No diastasis of rectus musculature.       No edema or masses.         Bones:  No bone lesions. Compression fracture, T9, unchanged.     6 mm anterolisthesis L4 on L5. 6 mm anterolisthesis L5 on S1. upper abdominal lymphadenopathy identified. Bone marrow signal appears normal.         Small bilateral pleural effusions with adjacent atelectasis.           Impression         One if not two stones within the distal common bile measuring up to 8 mm duct, resulting in dilation of the common bile duct up to 11 mm and mild intrahepatic biliary dilatation.         Cholelithiasis, gallbladder wall thickening, and a small amount of pericholecystic fluid is concerning for acute cholecystitis.         A small amount of fluid along the duodenum and head/uncinate process of the pancreas may be due to acute cholecystitis however correlation for pancreatitis is recommended.            ASSESSMENT:  PLAN:    acute cholecystitis   On Zosyn

## 2020-11-30 PROBLEM — E43 SEVERE MALNUTRITION (HCC): Status: ACTIVE | Noted: 2020-01-01

## 2020-11-30 NOTE — PROGRESS NOTES
Physical Therapy Med Surg Daily Treatment Note  Facility/Department: [address] [facility]  [facility]       NAME: Sivan Young  : 1933 (80 y.o.)  MRN: [id number]  CODE STATUS: DNR-CC    Date of Service: 2020    Patient Diagnosis(es): Atrial fibrillation with rapid ventricular response [facility]) [I48.91]   Chief Complaint   Patient presents with    Back Pain     week long; shooting up and down left leg    Tailbone Pain     decubitus     Patient Active Problem List    Diagnosis Date Noted    Sepsis due to urinary tract infection ([facility] 2020    Pressure injury of skin of contiguous region involving back, buttock, and hip 2020    Constipation, acute 2020    LFT elevation 2020    Hyperkalemia 2020    Gait abnormality 2020    Aortic stenosis 2020    DANNY (generalized anxiety disorder) 2020    GERD (gastroesophageal reflux disease) 2020    Hyperlipidemia 2020    Major depressive disorder 2020    Osteoporosis 2020    Vitamin D deficiency 2020    Atrial fibrillation with rapid ventricular response ([facility] 2020    Pneumonia 2020    Osteoarthritis, generalized 10/01/2019    Hypoxia 2019    Abrasion of buttock 2018    Recurrent major depressive disorder, in full remission ([facility] 2018        Past Medical History:   Diagnosis Date    Anticoagulated on Coumadin     Arthritis     Asthma     Atrial fibrillation ([facility]     History of blood transfusion     Pneumonia      Past Surgical History:   Procedure Laterality Date    COLONOSCOPY      JOINT REPLACEMENT Bilateral     bilateral femur replacements          Restrictions  Restrictions/Precautions: Fall Risk    SUBJECTIVE   Subjective  Subjective: I'll try.     Pre-Session Pain Report  Pre Treatment Pain Screening  Pain at present: 0  Intervention List: Patient able to continue with treatment          Post-Session Pain Report  Pain Assessment  Pain Level: 2  Pain Type: Acute pain  Pain Location: Hip  Pain Orientation: Left  Pain Descriptors: Aching         OBJECTIVE        Bed mobility  Rolling to Left: Moderate assistance  Supine to Sit: Moderate assistance  Sit to Supine: Maximum assistance  Scooting: Dependent/Total(Ravi pad utilized)  Comment: HOB slightly elevated for efficiency. hand over hand assist for sequencing; Pt is very fatigued. Sitting EOB c/o pain in her L hip and wanting to return to supine. Min A to maintain midline. Transfers  Comment: NT - d/t safety and fatigue. Exercises  Comments: Attempted to perform AP and LAQ but pt to fatigued. Activity Tolerance  Activity Tolerance: Patient limited by fatigue;Patient limited by pain          ASSESSMENT   Assessment: Pt agreeable to treatment but very fatigued. Sat EOB with Min A. Pt fatigued to attempt to stand on this date. Discharge Recommendations:  Continue to assess pending progress, Patient would benefit from continued therapy after discharge    Goals  Long term goals  Long term goal 1: Pt will demonstrate bed mobility indep  Long term goal 2: Pt will demonstrate transfers mod indep with rollator for return to prior level of function  Long term goal 3: Pt will demonstrate amb 150ft mod indep with rollator to allow pt to amb inside her home  Long term goal 4: Pt will demonstrate stair negotiation 4 steps with 2 rails mod indep to allow pt to enter and exit her home safely. PLAN    Safety Devices  Type of devices: All fall risk precautions in place;Call light within reach; Left in bed;Bed alarm in place     AMPAC (6 CLICK) BASIC MOBILITY  AM-PAC Inpatient Mobility Raw Score : 8      Therapy Time   Individual   Time In 0942   Time Out 1005   Minutes 23      bm - 15 mins  Therex 8 mins       Bette Richardson PTA, 11/30/20 at 10:14 AM       Definitions for assistance levels  Independent = pt does not require any physical supervision or assistance from another person for activity completion. Device may be needed.   Stand by assistance = pt requires verbal cues or instructions from another person, close to but not touching, to perform the activity  Minimal assistance= pt performs 75% or more of the activity; assistance is required to complete the activity  Moderate assistance= pt performs 50% of the activity; assistance is required to complete the activity  Maximal assistance = pt performs 25% of the activity; assistance is required to complete the activity  Dependent = pt requires total physical assistance to accomplish the task

## 2020-11-30 NOTE — PROGRESS NOTES
Gastroenterology Progress Note    Rah Elias is a 80 y.o. female patient. Hospitalization Day:8    Chief C/O: choledocholithisis    SUBJECTIVE: Seen and examined, no abdominal pain, tolerating diet, MRI with evidence of CB D stones with dilation up to 11 mm, LFTs are trending down, no leukocytosis, fever or chills on Zosyn. Made DNR comfort care by family over the weekend with ongoing conversations regarding hospice, however should not impact the decision to proceed with ERCP, discussed with patient she is agreeable to proceed. ROS:  Gastrointestinal ROS: no abdominal pain, change in bowel habits, or black or bloody stools    Physical    VITALS:  /73   Pulse 94   Temp 97.5 °F (36.4 °C) (Oral)   Resp 18   Ht 5' 4\" (1.626 m)   Wt 124 lb 9.6 oz (56.5 kg) Comment: bed has been zeroed  LMP  (LMP Unknown)   SpO2 95%   BMI 21.39 kg/m²   TEMPERATURE:  Current - Temp: 97.5 °F (36.4 °C); Max - Temp  Av.6 °F (36.4 °C)  Min: 97.4 °F (36.3 °C)  Max: 98 °F (36.7 °C)    General:  Alert and oriented,  No apparent distress  Skin- without jaundice  Eyes: anicteric sclera  Cardiac: RRR, Nl s1s2, without murmurs  Lungs CTA Bilaterally, normal effort  Abdomen soft, ND, NT, no HSM, Bowel sounds normal  Ext: without edema  Neuro: no asterixis     Data    Data Review:    Recent Labs     20  0620  0753   WBC 7.4  --    HGB 11.0* 12.4   HCT 34.0*  --    MCV 96.9  --      --      Recent Labs     20  0621 20  0753   *  --    K 3.5  --    *  --    CO2 23  --    BUN 28*  --    CREATININE 0.78 0.9     Recent Labs     20  0621   AST 13   ALT 15   BILITOT 1.5*   ALKPHOS 58     No results for input(s): LIPASE, AMYLASE in the last 72 hours.   Recent Labs     20  0620 20  1343 20  0958   PROTIME 25.7* 30.3* 38.8*   INR 2.4 2.9 4.0           ASSESSMENT:  42-year-old female with a history of cholecystitis and choledocholithiasis noted MRCP evidence of multiple CBD stones with CBD dilation up to 11 mm, noted abnormal LFTs which are improving since arrival, INR is 4-anticoagulation has been on hold. Has been on Zosyn, no leukocytosis, or fever and chills. Was made DNR comfort care over the weekend with ongoing conversations regarding hospice, however patient is agreeable to proceed with the ERCP. PLAN :  1.  Continue supportive course of care and medical management  2. ERCP pending clinical course/resp status, possibly tomorrow, INR 4- needs to be less than 2, pending clinical course this may be able to be completed as an outpatient  3. Continue IV antibiotics    Thank you for allowing me to participate in the care of your patient. Please feel free to contact me with any concerns.     Jose Cruz Cortes, HILDA - CNP

## 2020-11-30 NOTE — PROGRESS NOTES
Speech Language Pathology  SLP spoke with RN Tamara Lyn about family's decision for hospice care. Tamara Lyn reports that family is holding off on making a decision until tomorrow. Patient is still refusing recommendation of nectar thick liquids and RN reports she is taking small sips of thin liquids. SLP told RN to consult again if family decides to follow recommendation of an MBS. Speech will continue to follow for dysphagia education with patient and family.    Electronically signed by DAWIT Mcdonald on 11/30/2020 at 1:40 PM

## 2020-11-30 NOTE — PLAN OF CARE
Nutrition Problem #1: Severe malnutrition, In context of chronic illness  Intervention: Food and/or Nutrient Delivery: Continue Current Diet, Start Oral Nutrition Supplement, Start Tube Feeding  Nutritional Goals: improvement in oral intake > 25%, initiation of EN if in alignement wtih goals of care

## 2020-11-30 NOTE — PROGRESS NOTES
Infectious Disease     Patient Name: Veena Bond  Date: 11/30/2020  YOB: 1933  Medical Record Number: [id number]            acute cholecystitis       Review of Systems   Constitutional: Negative. Respiratory: Negative. Cardiovascular: Negative. Gastrointestinal: Positive for abdominal pain. Skin: Positive for wound. Physical Exam   Constitutional: She is oriented to person, place, and time. She appears distressed. Cardiovascular: Normal heart sounds. No murmur heard. Pulmonary/Chest: Breath sounds normal. No respiratory distress. She has no wheezes. She has no rales. She exhibits no tenderness. Abdominal: Soft. Bowel sounds are normal. She exhibits no distension. There is abdominal tenderness. There is no rebound and no guarding. Neurological: She is alert and oriented to person, place, and time. Skin: She is not diaphoretic. Blood pressure 113/66, pulse 100, temperature 98 °F (36.7 °C), temperature source Oral, resp. rate 19, height 5' 4\" (1.626 m), weight 124 lb 9.6 oz (56.5 kg), SpO2 98 %. .   Lab Results   Component Value Date    WBC 7.4 11/28/2020    HGB 12.4 11/28/2020    HCT 34.0 (L) 11/28/2020    MCV 96.9 11/28/2020     11/28/2020     Lab Results   Component Value Date     11/28/2020    K 3.5 11/28/2020    K 4.6 01/13/2020     11/28/2020    CO2 23 11/28/2020    BUN 28 11/28/2020    CREATININE 0.9 11/28/2020    CREATININE 0.78 11/28/2020    GLUCOSE 88 11/28/2020    CALCIUM 9.8 11/28/2020          CT of the Abdomen and Pelvis with intravenous contrast medium         History:  Left lower quadrant pain. Back pain. Left leg weakness.         Technical Factors:         CT imaging of the abdomen and pelvis were obtained and formatted as 5 mm contiguous axial images from the domes of the diaphragm to the symphysis pubis.  Sagittal and coronal reconstructions were also obtained.         Oral contrast medium:  None.     Intravenous contrast medium:  Isovue-370, 100 mL.         Comparison:  CT abdomen pelvis, January 13, 2020.         Findings:         Lungs:  Lung bases show emphysematous change with minimal to mild dependent subsegmental atelectatic change.         Liver:  Normal in size, shape, and decreased in attenuation with focal fatty sparing. 5 mm cystlike area dome, right hepatic lobe (series 2, image 16).      Bile Ducts:  Normal in caliber.         Gallbladder:  Distended, with high attenuation sludge, or punctate calculi in dependent portion of gallbladder. No para cholecystic fluid. No gallbladder wall thickening.         Pancreas:  Normal without masses, cysts, ductal dilatation or calcification.         Spleen:  Lower limits of normal in size, unchanged, without masses or calcifications.  No splenules.         Kidneys:  Cranial caudal dimensions, right and left kidneys, measures 7.6 cm, and 7.2 cm, respectively. No hydronephrosis, masses, or stones.         Adrenals:  Normal.         Small bowel:  Normal in caliber.         Appendix:  Normal.         Colon:  Copious stool, rectal vault, with minimal wall thickening, and perirectal fat stranding. Diverticular change, sigmoid colon.         Peritoneum:  No ascites, free air, or fluid collections.         Vessels:  Aorta normal in course and caliber.   Portal vein, splenic vein, superior mesenteric vein are patent.         Lymph nodes:           Retroperitoneal:  No enlarged retroperitoneal lymph nodes. Mesenteric:  No enlarged mesenteric lymph nodes. Pelvic: No enlarged pelvic lymph nodes.         Ureters: Normal in course and caliber. No calcifications.         Bladder: No wall thickening.         Reproductive organs: No pelvic masses.         Abdominal Wall:  No hernia identified.     No diastasis of rectus musculature.       No edema or masses.         Bones:  No bone lesions. Compression fracture, T9, unchanged.     6 mm anterolisthesis L4 on L5. 6 mm anterolisthesis L5 on S1. Diffuse disc space narrowing L5-S1. Diffuse disc space narrowing T9-10, through L2-L3. Lumbar scoliosis convexity to left apex L1-L2. Right femoral intramedullary zaina. Left femoral intramedullary zaina with femoral neck tension.              Impression         Sigmoid diverticulosis.         Fecal impaction with possible rectal stercoral colitis.         Hepatic steatosis with focal fatty sparing.         Spleen, lower limits of normal in size, unchanged.         Small bilateral kidneys.         Cholelithiasis versus high density gallbladder sludge.         Severe degenerative change lumbar spine with scoliosis.         Remote compression fracture, T9.         Grade 1, L4, and grade one, L5, spondylolisthesis, unchanged. EXAM: MRI ABDOMEN W WO CONTRAST MRCP         HISTORY: Choledocholithiasis.         TECHNIQUE: Multiplanar multisequence MRI of the abdomen was performed without and with contrast including 2-D and 3-D MRCP imaging.         COMPARISON: Ultrasound from November 24, 2020 and CT of the abdomen and pelvis from November 22, 2020         FINDINGS:         Examination is degraded by motion artifact.         There appears to be an 8 mm filling defect within the distal common bile duct. A possible second smaller filling defect is questioned. The common bile duct is dilated measuring up to 11 mm in diameter. Mild intrahepatic biliary dilatation.         The gallbladder contains numerous small gallstones. The gallbladder wall appears mildly thickened measuring approximately 4 mm in thickness. There is a small amount of pericholecystic fluid.         There is a small amount of peripancreatic fluid about the pancreatic head/uncinate process. A small amount of fluid is also present along the duodenum. No enhancing pancreatic mass. The pancreatic duct is not dilated.         2 small hepatic cysts are identified. No enhancing liver lesion.         The kidneys enhance uniformly.  No hydronephrosis.         No upper abdominal lymphadenopathy identified. Bone marrow signal appears normal.         Small bilateral pleural effusions with adjacent atelectasis.           Impression         One if not two stones within the distal common bile measuring up to 8 mm duct, resulting in dilation of the common bile duct up to 11 mm and mild intrahepatic biliary dilatation.         Cholelithiasis, gallbladder wall thickening, and a small amount of pericholecystic fluid is concerning for acute cholecystitis.         A small amount of fluid along the duodenum and head/uncinate process of the pancreas may be due to acute cholecystitis however correlation for pancreatitis is recommended.            ASSESSMENT:  PLAN:    acute cholecystitis   On Zosyn

## 2020-11-30 NOTE — PROGRESS NOTES
[facility] Cardiology Progress Note        Date:   2020    Patient:    Amparo Cervantes    :    1933  CSN:    [id number]    Rounding Cardiologist: Zenaida Dill MD     Primary Cardiologist: Lenny Spicer MD [facility]    Requesting Physician:  Linda Barnett MD      Reason for Consult:  Afib w RVR. Subjective: Made DNR CC by family over weekend. ERCP pending for now. Patient still has abdominal pain, but better, and now with improving liver function studies, yet elevated sed rate C/W abdominal process,  Cholecystitis and Sepsis. Urinalysis c/w urinary tract infection as well. On Antibiotics. Echocardiogram notes normal LV systolic function, LVEF 77% with evidence of diastolic dysfunction but no significant valvular heart disease ( Mild Moderate AS only). Troponins have been negative to date. BPs still on low side. Patient has a history of permanent atrial fibrillation rate control. Post attemted [facility] last week. Briefly in [facility], now recurrent AFIB. Rates are currently a bit probably due to underlying medical illness. Would suggest adjustment of underlying medical problems and GI care. Tired. No CP or SOB. Alert. Wondering what is going to happen. 14 system General and Cardiac ROS otherwise negative or unchanged. Assessment:    1. Bilateral lower extremity weakness, progressive  2. Lower back pain  3. Elevated LFTs / Abdominal discomfort. 4. Cholecystitis. 5. Urinary tract infection  6. Sepsis  7. Permanent atrial fibrillation, with RVR , Post Cardioversion , Now back in AFIB. 8. Long-term anticoagulation, current Coumadin, holding for ERCP? 9. Negative Myoview perfusion stress test 10/2019,  10. Normal LV systolic function by echocardiogram 10/2019, ejection fraction 66%, Same by echo . 11. Aortic valve stenosis, mild, V-max 1.8 m/s. Same by Echo . 12. Pulmonary fibrosis   13. Asthma  14.  Mild pulmonary hypertension RVSP 43 last.  15. Past smoker  16. Hypothyroidism  17. GERD  18. Depression  19. Overweight  20. Degenerative joint disease, lumbar spine stenosis with neuropathy. 21. DNR CCA STATUS  22. Otherwise as prior documentation. Plan:    1. Cardiac Supportive Care  2. Primary care to treat underlying urinary tract infection / cholecystitis and medical needs. 3. ID care with ABX Rx.  4. DC Albumin IV and Decrease IV hydration for now. 5. Rate Control for AFIB. 6. Resume Coumadin unless other procedures are warranted, hospital pharmacy to manage. 7. Continue prior home cardiovascular medications with appropriate adjustments for heart rate and blood pressure as needed. 8. Serial telemetry. 9. Serial laboratories including troponins. 10. OK for Procedures from CV point if planned. 11. Further Recommendations to follow  12. See Orders    ++++++++++++++++++++++++++++++++++    HISTORY OF PRESENT ILLNESS:      Manju Rodriguez is a pleasant 80 y.o. female with with the above-noted past cardiac and medical history including permanent atrial fibrillation on chronic Coumadin anticoagulation, mild aortic valve stenosis, preserved left ventricular function, pulmonary fibrosis, asthma who now presents with bilateral lower extremity weakness and back pain. Patient has had progression of her symptomatology over the last several years but more recently over the last month. She feels too weak to get up and do her normal chores. She cannot climb steps now. She lives alone. She came in for evaluation through the emergency room. She was noted to have rapid atrial fibrillation. INR was 3.3. Initial troponin was negative. She was found to have urinary tract infection. She was admitted for further evaluation and possible placement. She is currently in isolation precautions. Cardiology was asked to see for further relation consultation. Patient Follows with Tee Fan MD [facility]. See his last note attached below.     Patient History and Records, EMR reviewed. Patient Interviewed and examined. Fair historian. Denies CP, SOB, LH, Dizziness, TIA or CVA Symptoms. No Orthopnea, Edema or CHF symptoms. No Palpitations. No Syncope. No Fever, Chills or Cold symptoms. No GI or Bleeding complaints. Cardiac and general ROS otherwise negative. [address] otherwise negative other than noted. Past Medical History:   Diagnosis Date    Anticoagulated on Coumadin     Arthritis     Asthma     Atrial fibrillation ([facility] [address]     History of blood transfusion     Pneumonia        Past Surgical History:   Procedure Laterality Date    COLONOSCOPY      JOINT REPLACEMENT Bilateral     bilateral femur replacements       Prior to Admission medications    Medication Sig Start Date End Date Taking? Authorizing Provider   traMADol (ULTRAM) 50 MG tablet Take 50 mg by mouth every 8 hours as needed for Pain.    Yes Historical Provider, MD   metoprolol tartrate (LOPRESSOR) 50 MG tablet Take 75 mg by mouth daily   Yes Historical Provider, MD   fluticasone-salmeterol (ADVAIR DISKUS) 250-50 MCG/DOSE AEPB Inhale 1 puff into the lungs 12/28/17  Yes Historical Provider, MD   warfarin (COUMADIN) 5 MG tablet Take 5 mg by mouth 7/27/17  Yes Historical Provider, MD       Scheduled Meds:   polyethylene glycol  17 g Oral BID    senna  5 mL Oral BID    amiodarone  200 mg Oral BID    Vitamin D  2,000 Units Oral Dinner    cyanocobalamin  1,000 mcg Intramuscular Weekly    coenzyme Q10  100 mg Oral Daily    lidocaine  3 patch Transdermal Daily    metoprolol tartrate  12.5 mg Oral BID    albumin human  50 g Intravenous BID    lactulose  30 g Oral BID    docusate sodium  100 mg Oral Daily    piperacillin-tazobactam  3.375 g Intravenous Q8H    sodium chloride flush  10 mL Intravenous 2 times per day     Continuous Infusions:   sodium chloride 100 mL/hr at 11/30/20 1033     PRN Meds:melatonin, albuterol, magnesium hydroxide, bisacodyl, sodium chloride flush, acetaminophen **OR** acetaminophen, polyethylene glycol, promethazine **OR** ondansetron, metoprolol    Allergies   Allergen Reactions    Carbamazepine And Analogs     Codeine        Social History     Socioeconomic History    Marital status:      Spouse name: Not on file    Number of children: 2    Years of education: 15    Highest education level: Associate degree: occupational, technical, or vocational program   Occupational History    Occupation: retired RN -[employer]   Social Needs    Financial resource strain: Not on file    Food insecurity     Worry: Never true     Inability: Never true    Transportation needs     Medical: No     Non-medical: No   Tobacco Use    Smoking status: Former Smoker     Packs/day: 0.25     Types: Cigarettes    Smokeless tobacco: Never Used    Tobacco comment: Quit smoking at 18yrs of age   Substance and Sexual Activity    Alcohol use: Not Currently     Comment: occasional    Drug use: Not Currently    Sexual activity: Not Currently     Partners: Male   Lifestyle    Physical activity     Days per week: 0 days     Minutes per session: 0 min    Stress: Only a little   Relationships    Social connections     Talks on phone: More than three times a week     Gets together: More than three times a week     Attends Scientology service: Never     Active member of club or organization: No     Attends meetings of clubs or organizations: Never     Relationship status:     Intimate partner violence     Fear of current or ex partner: No     Emotionally abused: No     Physically abused: No     Forced sexual activity: No   Other Topics Concern    Not on file   Social History Narrative     [address] History    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                                    Family History   Problem Relation Age of Onset    High Blood Pressure Mother     Osteoporosis Mother    Floydene Ada Other Brother        Review Of Systems:    14 point ROS negative other than mentioned. Physical Exam:    CURRENT VITALS: BP 94/61   Pulse 91   Temp 97.4 °F (36.3 °C) (Oral)   Resp 18   Ht 5' 4\" (1.626 m)   Wt 124 lb 9.6 oz (56.5 kg) Comment: bed has been zeroed  LMP  (LMP Unknown)   SpO2 100%   BMI 21.39 kg/m²     Per Staff and Notes:  CONSTITUTIONAL:  awake, alert, cooperative, no apparent distress,   ENT:  Normocephalic, without obvious abnormality, atraumatic, sinuses nontender on palpation, external ears without lesions,  NECK:  Supple, symmetrical, trachea midline, no adenopathy, thyroid symmetric, not enlarged and no tenderness, skin normal, No bruits. LUNGS:  No increased work of breathing, good air exchange, clear to auscultation bilaterally, no crackles, no wheezing  CARDIOVASCULAR:  Normal apical impulse, irregular rate and rhythm, normal S1 and S2,  2/6 Systolic murmur noted. ABDOMEN:  Obese, normal bowel sounds, soft, non-distended, mildly tender, no masses palpated, no hepatosplenomegally  EXTREMETIES: No edema, Pulses Strong Thruout. No ulcers. NEUROLOGIC:  Awake, alert, oriented to name, place and time. Following all commands and moving all extremties. SKIN:  no bruising or bleeding, normal skin color, texture, turgor and no rashes    Labs:  No results found for this or any previous visit (from the past 24 hour(s)). ECG:     SR, with APCs / VPCs, 70s.11/23 now back in AFib 11/25    TELEMETRY:  Afib / Flutter , 105 range     ECHO:     1. Abnormal echocardiogram.    2. Normal left ventricular systolic function, LVEF 65 %.    3. Moderate Aortic valve stenosis with estimated aortic valve area 1.1    cm2.    4. Moderate AI, MR.    5. Moderate Left ventricular hypertrophy.    6. Moderate Bi atrial enlargement.    7. Normal pericardium.          Joshua Brennan MD  [facility] Cardiologist      Electronically signed on 11/22/20 at 9:16 AM EST      -----    Dr Kathryn Dotson Note:    Subjective  PCP: Yanelis   Subjective: Mrs. Karan Johansen follows here for mild-moderate aortic stenosis and permanent atrial fibrillation. She is on chronic anticoagulation with Coumadin managed by at [facility]. Last echocardiogram just 1 year ago with 65% ejection fraction mild aortic stenosis with mean gradient 9 mmHg peak velocity 1.8 m/s RVSP 43. Tosin  When last seen in March she had been doing quite well though does lead a fairly sedentary lifestyle. Lab work 6/30/2020 CBC normal INR that particular day 3.5 CMP remarkable only for albumin mildly decreased 3.3 glucose 102 creatinine normal 0.91 electrolytes     She denies chest pain, chest pressure, dyspnea, palpitations, lightheadedness, syncope, pnd and orthopnea. She has not recently been hospitalized or seen in an ER. Is mention that perhaps once a month she will have slight lightheadedness if she gets up too fast particularly if she has been sitting on the toilet. She does say however that she does not always maintain adequate hydration and does not always eat regularly. She has had no syncope. She has had no bleeding on anticoagulation and she has no sensation of her atrial fibrillation    Impression  1. Permanent atrial fibrillation: Rate is well controlled at rest. She has no symptoms of CHF to suggest excess rates with activity. She is had no bleeding on anticoagulation this is followed closely at [facility] continue same. Although blood pressure is on the low side usually she says it is 110-120. I suspect some of the relatively low pressures are related to inadequate p.o. intake. Could consider decreasing metoprolol but she said she felt much better when I increased it from 50-75 because of better control of heart rate. For now continue current medical regimen    2. Stenosis: Mild stenosis at echo 1 year ago the likelihood of progression to significant disease at her age is extraordinarily low and probably 0    3.  Dizziness: Almost certainly due to volume depletion given that it only occurs once a month and she does describe relatively frequently actually poor p.o. intake and somewhat erratic eating habits. Courage to maintain hydration      Orders  Return visit 6 months   Urged her to maintain better hydration and to be sure she eats regularly    ROS  No mental status changes. Sometimes fatigue  No fever, chills or sweats. No hematuria. No abdominal pain, hematochezia, or hematemesis. No arthritic complaints. No focal weakness of limbs. No epistaxis. No cold intolerance. No claudication  No visual complaints    Physical exam  Blood pressure 100/80. Heart rate 70. Respiratory rate 12. She appears quite comfortable she is not diaphoretic. There is no elevation jugular venous distention. She has no abdominal pain. There is no edema    Over 50% of patient evaluation involved discussion of diagnosis, pathophysiology, treatment and care coordination. Chief Complaint  6 month follow up   Chief Complaint Free Text:   An interactive audio and video telecommunication system which permitted real time communication was used with the patient consent to complete today's visit. Active Problems   1. Anxiety disorder (300.00) (F41.9)   2. Aortic stenosis (424.1) (I35.0)   · 10/11/2019 echo. LVEF 65%. Moderate LVH. Biatrial enlargement. Moderate aortic      stenosis peak/mean gradients 13/9 mmHg. Peak velocity 1.8 m/s. RVSP 43 mmHg   MAY 2017 echocardiogram: LVEF 66%. Biatrial enlargement. Mild RV dilatation. Aortic      valve area 1.6 cm². Peak/mean gradient 14/7 mmHg. RVSP 31 mm   3. Asthma (493.90) (J45.909)   4. Chronic GERD (530.81) (K21.9)   5. Depression (311) (F32.9)   6. Dizziness (780.4) (R42)   7. High risk medication use (V58.69) (Z79.899)   8. Hyperlipidemia (272.4) (E78.5)   9. Hypothyroidism (244.9) (E03.9)   10. Normal cardiac stress test   · 10/11/2019. Lexiscan perfusion. Normal perfusion. 66% LVEF   11.  Overweight (278.02) (E66.3)   12. Permanent atrial fibrillation (427.31) (I48.21)   Initial diagnosis prior to 2014 paroxysmal at that time. 2017 was far more frequent and      antiarrhythmic therapy was considered but rate controlled preferred by patient. As of      September 2 019: Likely permanent. 13. Pulmonary fibrosis (515) (J84.10)   14. Right carotid bruit (785.9) (R09.89)   · 10/11/2019. Carotid duplex. Less than 50% bilateral stenosis with mild diffuse plaque   15. Former smoker (B26.72) (O04.017)    Family History   1. Family history of hypertension (V17.49) (Z82.49) : Mother   2. Family history of malignant neoplasm (V16.9) (Z80.9) : Brother    Social History   · Daily caffeine consumption, 4-5 servings a day   · Former smoker (V15.82) (Q31.241)   · No illicit drug use   · Occasional alcohol consumption (V49.89) (Z78.9)    Surgical History  Denies any surgeries or colonoscopy since last appt. ANoble, LPN     Current Meds   1. Metoprolol Succinate ER 25 MG Oral Tablet Extended Release 24 Hour; one tablet at   bedtime in addition to 50mg=75mg; Therapy: 15TCH8928 to (Evaluate:39Rmk0768)  Requested for: 29MKN0845; Last   Rx:[id number] Ordered   2. Metoprolol Succinate ER 50 MG Oral Tablet Extended Release 24 Hour; 1 tablet at   bedtime in addition to 25mg=75mg; Therapy: 34RRN6489 to (Evaluate:47Xij7819)  Requested for: 16Jag0660; Last   Rx:[id number] Ordered   3. traMADol HCl - 50 MG Oral Tablet; TAKE 1 TABLET 3 TIMES DAILY AS NEEDED; Therapy: 78MLS2793 to Recorded   4. Vitamin D3 50 MCG (2000 UT) Oral Tablet; take 1 tablet daily; Therapy: (Will Alejandro) to Recorded   5. Warfarin Sodium 2 MG Oral Tablet; take 1 tabelt daily as directed. managed by pcp office; Therapy: (Recorded:60Rny5752) to Recorded   6. Warfarin Sodium 2 MG Oral Tablet; TAKE 1 TABLET DAILY; Therapy: 09WAQ5402 to Recorded   7.  [place] 250-50 MCG/DOSE Inhalation Aerosol Powder Breath Activated; INHALE 1   PUFF TWICE DAILY; Therapy: 44Qfs7272 to Recorded    Meds reviewed with daughter with list on her phone, via phone call. ANoble, LPN     Allergies   1. codeine   Hallucinations;; Updated By: Eden Parry; 10/22/2019 1:25:31 PM   2. TEGretol TABS   also had abnormal blood levels; Rash; Recorded By: Eden Parry; 10/22/2019 1:25:32 PM    Immunizations  PCV --- Christa Leandroasant: 01-Oct-2019   PPSV --- Chritsa Reevesasant: 01-Jan-2011     Pt does not get flu vaccines. ANoble, LPN     Vitals  Daughter says mother does not check bp's. Advised to check if she has a monitor available prior to tomorrow appt. ANoble, LPN     Assessment   1. Permanent atrial fibrillation (427.31) (I48.21)   2. Aortic stenosis (424.1) (I35.0)   3. Dizziness (780.4) (R42)    Plan   1. 6 month follow-up/call if interval problems. Evaluation and Treatment  Follow-up  Status:   Active  Requested for: 39FNT3391   2. Follow up per family physician. Evaluation and Treatment  Follow-up  Status: Active    Requested for: 95FUM8561   1. Access your medical record, request prescriptions, view laboratory and testing done in   our office, request appointments, view immunization records and message your provider   through our safe and secure patient portal. Ask a staff member how   to register or visit us at [email or web address] to get started today.;   Status:Complete;   Done: 97Hqt9902   4. Continue same medications/treatment.; Status:Active; Requested for:80Dbl9644;    5. Drink plenty of fluids.; Status:Active; Requested for:37Qvh5908;    6. Eat a normal well-balanced diet.; Status:Active; Requested for:31Fsz1721;    7. Please bring all medicines, vitamins, and herbal supplements with you when you come   to the office.; Status:Active; Requested for:49Enc1618;    8. Please bring any lab results from other providers/physicians to your next appointment.;   Status:Active; Requested for:67Srv5644;    9.  Prescriptions will not be filled unless you are compliant with your follow up appointments   or have a follow up appointments scheduled as per the instruction of your physician. Refills for medications should be requested at the time of your office visit.; Status:Active; Requested for:65Jvf4523;    10. Thank you for being a patient at [facility]. Our goal is to    provide high quality medical care. Following today's visit, please check your email for an    invitation to complete our patient satisfaction survey. By sharing your feedback, you will    help us continue our mission to provide excellent medical care. Your participation in the    survey is voluntary and completely confidential. We appreciate your thoughts regarding    today's visit.; Status:Active; Requested for:10Syd3546;    11. Tobacco use Hx Reported; Status:Complete;   Done: 50UBG7463    Discussion/Summary  As discussed today, please be sure to drink more fluids and eat regularly.      Signatures  Electronically signed by : Haley Mosqueda LPN; Oct  7 3381  8:14EH EST                        Electronically signed by : Jaqueline Gonsalves LPN; Oct  8 3267  9:00LU EST                        Electronically signed by : Gilbert Tran M.D.; Oct  8 2020  6:08PM EST

## 2020-11-30 NOTE — PROGRESS NOTES
Comprehensive Nutrition Assessment    Type and Reason for Visit:  Reassess, Consult(' Nutrition management- ? PN')    Nutrition Recommendations/Plan:   Continue with Minced and moist diet, thin liquids  Pt/family have acknowledged aspiration risks with diet AMA  Begin oral nutrition supplements  TPN/PPN not indicated, preferred route of nutrition support is Tube feeding, if in alignment with goals of care    Nutrition Assessment:  Severe malnutrition related to chronic illness, Noted pt decided to continue wtih Minced and Moist diet wtih with liquids AMA, and acknowledged the risks, Oral intake has been < 25% since admit with noted 15# ( 11%) weight loss < 6 months. Parenteral Nutirtion ( TPN) does not  indicated, as pt appears to have functional GI tract, NG placement for TF is preferrred route of nutriton support, however noted DNR-CC status. Malnutrition Assessment:  Malnutrition Status:  Severe malnutrition    Context:  Chronic Illness     Findings of the 6 clinical characteristics of malnutrition:  Energy Intake:  7 - 75% or less estimated energy requirements for 1 month or longer  Weight Loss:  7 - 10% over 6 months     Body Fat Loss:  Unable to assess(NFPA deferred due to COVID protocol)     Muscle Mass Loss:  Unable to assess    Fluid Accumulation:  No significant fluid accumulation     Strength:  Not Performed    Estimated Daily Nutrient Needs:  Energy (kcal):  1488-8533 kcals @ 28-30 kcal/kg; Weight Used for Energy Requirements:  Current(56.5 kg)     Protein (g):  79-90 g protein @ 1.4-1.6 g/kg; Weight Used for Protein Requirements:  Current(56.5 kg)        Fluid (ml/day):  ~1500 (kg x 25);  Method Used for Fluid Requirements:  ml/Kg      Nutrition Related Findings:  pt soundly sleeping upon visit, Perioheral IVF = D5.9NS @ 50 ml/hr (~200 kcals), BM 11/26, meds noted, labs reviewed      Wounds:  Pressure Injury, Stage II, Deep Tissue Injury(BL buttocks, sacrum)       Current Nutrition Therapies: DIET GENERAL; Dysphagia Minced and Moist    Anthropometric Measures:  · Height: 5' 4\" (162.6 cm)  · Current Body Weight: 124 lb (56.2 kg)   · Admission Body Weight: 130 lb (59 kg)([facility])    · Usual Body Weight: 139 lb (63 kg)((6/30/20), 155-161 # stated)     · Ideal Body Weight: 120 lbs; % Ideal Body Weight   ~100%  · BMI: 21.3    · BMI Categories: Underweight (BMI less than 22) age over 72       Nutrition Diagnosis:   · Severe malnutrition, In context of chronic illness related to inadequate protein-energy intake, swallowing difficulty, increase demand for energy/nutrients as evidenced by poor intake prior to admission, intake 0-25%, weight loss, weight loss greater than or equal to 10% in 6 months, wounds      Nutrition Interventions:   Food and/or Nutrient Delivery:  Continue Current Diet, Start Oral Nutrition Supplement, Start Tube Feeding  Nutrition Education/Counseling:  No recommendation at this time   Coordination of Nutrition Care:  No recommendation at this time, Continue to monitor while inpatient    Goals:  improvement in oral intake > 25%, initiation of EN if in alignement [facility] goals of care       Nutrition Monitoring and Evaluation:      Food/Nutrient Intake Outcomes:  Food and Nutrient Intake, Supplement Intake, Enteral Nutrition Intake/Tolerance  Physical Signs/Symptoms Outcomes:  Chewing or Swallowing, Meal Time Behavior, Weight, Skin     Discharge Planning:     Too soon to determine     Electronically signed by Eliana Hatfield RD, LD on 11/30/20 at 1:58 PM EST

## 2020-11-30 NOTE — PROGRESS NOTES
Hospitalist Progress Note      PCP: Radha Corcoran    Date of Admission: 11/22/2020    Chief Complaint: weakness    Subjective: pt looks sleepy, comfortable     Medications:  Reviewed    Infusion Medications    dextrose 5 % and 0.9 % NaCl       Scheduled Medications    polyethylene glycol  17 g Oral BID    senna  5 mL Oral BID    amiodarone  200 mg Oral BID    Vitamin D  2,000 Units Oral Dinner    cyanocobalamin  1,000 mcg Intramuscular Weekly    coenzyme Q10  100 mg Oral Daily    lidocaine  3 patch Transdermal Daily    metoprolol tartrate  12.5 mg Oral BID    lactulose  30 g Oral BID    docusate sodium  100 mg Oral Daily    piperacillin-tazobactam  3.375 g Intravenous Q8H    sodium chloride flush  10 mL Intravenous 2 times per day     PRN Meds: melatonin, albuterol, magnesium hydroxide, bisacodyl, sodium chloride flush, acetaminophen **OR** acetaminophen, polyethylene glycol, promethazine **OR** ondansetron, metoprolol      Intake/Output Summary (Last 24 hours) at 11/30/2020 1243  Last data filed at 11/30/2020 1140  Gross per 24 hour   Intake 1170 ml   Output --   Net 1170 ml       Exam:    BP 94/61   Pulse 90   Temp 97.4 °F (36.3 °C) (Oral)   Resp 18   Ht 5' 4\" (1.626 m)   Wt 124 lb 9.6 oz (56.5 kg) Comment: bed has been zeroed  LMP  (LMP Unknown)   SpO2 100%   BMI 21.39 kg/m²     General appearance: No apparent distress, appears stated age   HEENT: Pupils equal, round, and reactive to light. Conjunctivae/corneas clear. Neck: Supple, with full range of motion. No jugular venous distention. Trachea midline. Respiratory:  Normal respiratory effort. Clear to auscultation, bilaterally without Rales/Wheezes/Rhonchi. Cardiovascular: Regular rate and rhythm with normal S1/S2 without murmurs, rubs or gallops. Abdomen: Soft, non-tender, non-distended with normal bowel sounds. Musculoskeletal: some edema bilaterally.    Skin: Skin color, texture, turgor normal.  No rashes or lesions. Neurologic:  Neurovascularly intact without any focal sensory/motor deficits. .  Psychiatric:partially Alert and oriented,  Capillary Refill: Brisk,< 3 seconds   Peripheral Pulses: +2 palpable, equal bilaterally       Labs:   Recent Labs     11/28/20  0620 11/28/20  0753   WBC 7.4  --    HGB 11.0* 12.4   HCT 34.0*  --      --      Recent Labs     11/28/20  0621 11/28/20  0753   *  --    K 3.5  --    *  --    CO2 23  --    BUN 28*  --    CREATININE 0.78 0.9   CALCIUM 9.8  --      Recent Labs     11/28/20  0621   AST 13   ALT 15   BILITOT 1.5*   ALKPHOS 58     Recent Labs     11/28/20  0620 11/28/20  1343 11/30/20  0958   INR 2.4 2.9 4.0     No results for input(s): German Neil in the last 72 hours. Urinalysis:      Lab Results   Component Value Date    NITRU POSITIVE 11/22/2020    WBCUA 6-9 11/22/2020    BACTERIA MANY 11/22/2020    RBCUA 3-5 11/22/2020    BLOODU TRACE 11/22/2020    SPECGRAV 1.020 11/22/2020    GLUCOSEU Negative 11/22/2020       Radiology:  XR CHEST PORTABLE   Final Result   RIGHT MID AND RIGHT LOWER LUNG ATELECTASIS/PNEUMONIA. FINDINGS UNCHANGED. MRI ABDOMEN W WO CONTRAST MRCP   Final Result      One if not two stones within the distal common bile measuring up to 8 mm duct, resulting in dilation of the common bile duct up to 11 mm and mild intrahepatic biliary dilatation. Cholelithiasis, gallbladder wall thickening, and a small amount of pericholecystic fluid is concerning for acute cholecystitis. A small amount of fluid along the duodenum and head/uncinate process of the pancreas may be due to acute cholecystitis however correlation for pancreatitis is recommended. XR CHEST (2 VW)   Final Result   Low inspiratory lung show probable retrocardiac and chronic increase and show markings in both lungs. There are no large infiltrates or consolidations. US ABDOMEN LIMITED Specify organ?  LIVER, GALLBLADDER   Final Result acute [K59.00] 11/24/2020    LFT elevation [R79.89] 11/24/2020    Hyperkalemia [E87.5] 11/24/2020    Gait abnormality [R26.9] 11/23/2020    DANNY (generalized anxiety disorder) [F41.1] 11/23/2020    GERD (gastroesophageal reflux disease) [K21.9] 11/23/2020    Hyperlipidemia [E78.5] 11/23/2020    Major depressive disorder [F32.9] 11/23/2020    Vitamin D deficiency [E55.9] 11/23/2020    Osteoporosis [M81.0] 11/23/2020    Atrial fibrillation with rapid ventricular response ([facility] [address] [I48.91] 11/22/2020    Recurrent major depressive disorder, in full remission ([facility] [address] [F33.42] 07/03/2018         DVT Prophylaxis: coumadin  Diet: DIET GENERAL; Dysphagia Minced and Moist  Code Status: DNR-CC    PT/OT Eval Status:     Dispo -   Metabolic encephalopathy, confusion- some improvement today, continue with mild hydration, supportive care   Dyspnea, acute respiratory failure- O2, resp Tx, pulmonary on case, refused BIPAP  Sepsis/cholecystitis- atbs per ID, plan for ERCP per GI service  General weakness-supportive care, needs placement at [address] [facility] [address]- resolved, follow up with BMP AM< continue with mild hydration   A fib- post cardioversion, coumadin on hold  Cholecystitis with choledocholithiasis- pain controlled, ERCP  per GI service   Pt is critically ill, overall poor prognosis.  Will follow along with consultants            Electronically signed by Rm Garcia MD on 11/30/2020 at 12:43 PM

## 2020-11-30 NOTE — FLOWSHEET NOTE
AM assessment completed. Patient resting in bed at this time with no signs of any acute distress. Denies any chest pain at this time. Remains atrial fibrillation on the monitor. No edema noted. Pedal pulses weak but palpable. Shortness of breath noted with exertion. Lungs are very diminished with expiratory wheezes bilaterally. SATS 100% on 3L NC. Patient is mainly a mouth breather, but she is refusing to wear the bi-pap mask. She is A/Ox2 this AM, to person and place. Denies any pain with elimination. Seferino care provided, zinc cream applied liberally to seferino-area and buttocks, and a new pure wick catheter placed and set to wall suction. Mepilex dressing remains clean, dry and intact to sacral area. Skin remains warm and dry. Bruising noted to bilateral upper extremities. Stage 2 open area noted to bilateral buttocks with no odor or drainage. #20g SL to left upper forearm with NS infusing at 100ml/hr. #20g SL to left lower forearm remains flushed and patent, but per protocol will be removed today due to expiration. Oral care provided at this time and patient turned to left side for comfort. Bilateral heels remain elevated off of bed with pillows. Vital signs remain stable and AM medications provided. Call light remains in reach.  Electronically signed by Ellen Nassar RN on 11/30/2020 at 11:34 AM

## 2020-12-01 NOTE — FLOWSHEET NOTE
During morning rounds, PCA found patient with oxygen nasal canula off. SpO2 56% on RA. Pt unresponsive. [facility] status. Pt initially placed on NRB, SpO2 up to 72%, so placed on bipap. Resp called and Deja down to assess. On bipap and FiO2 up to 100%. Pt's sat finally came up. Made a 1:1 with full face bipap on. BP low. Dr. Migue Bejarano / Jessica Sevilla / Jo Hernandez. Dr. Brian Diana here and on floor. Reviewing pt's chart.  Electronically signed by Jorge Diaz RN on 12/1/2020 at 5:43 PM

## 2020-12-01 NOTE — PROGRESS NOTES
[facility]   Facility/Department: [address]  Speech Language Pathology    Sunshine Vega  7/13/1933  [id number]    Date: 12/1/2020      Speech Therapy attempted to see Sunshine Vega on this date for a/an:    Treatment    Pt was unable to be seen due to:   Nursing deferred: and Other: Pt's family has decided to go Hospice this afternoon. Discussed with RN Elissa Lundy. ST no longer needed to follow pt.         Electronically signed by ABAD Murray-SLP on 12/1/20 at 1:04 PM EST

## 2020-12-01 NOTE — FLOWSHEET NOTE
Covid swab completed and results - negative. Pt's daughter requesting she come off bipap - placed on 100% NRB. Pt has her eyes open, trying to talk and moving her hands. Family remains at bedside.  Electronically signed by Ayo Barrios RN on 12/1/2020 at 5:51 PM

## 2020-12-01 NOTE — PROGRESS NOTES
Subjective: The patient became unresponsive this am--condolences given     Reviewed recent MD note, \"  Patient daughter and son-in-law at bedside. Patient is in and out of sleep. She is agreeable to daughter making decisions for her care. Dr Jorge Leonard notified of family wish for [facility]. Patient resting in bed with no signs of any acute distress at this time \". She seems to have atelectasis and pneumonia--much better since diuresis . ROS x10: The patient also complains of severely impaired mobility and activities of daily living. Otherwise no new problems with vision, hearing, nose, mouth, throat, dermal, cardiovascular, GI, , pulmonary, musculoskeletal, psychiatric or neurological. See Rehab consult on Rehab chart . Vital signs:  BP (!) 60/41   Pulse 75   Temp 97.4 °F (36.3 °C) (Oral)   Resp 14   Ht 5' 4\" (1.626 m)   Wt 124 lb 9.6 oz (56.5 kg) Comment: bed has been zeroed  LMP  (LMP Unknown)   SpO2 99%   BMI 21.39 kg/m²   I/O:     General:  Patient is  Resting in bed     Rehabilitation:    tx held dt low BP    Physical therapy: FIMS:  Bed Mobility: Scooting: Dependent/Total(Ravi pad utilized)    Transfers: Sit to Stand: Moderate Assistance  Stand to sit: Moderate Assistance,  ,      FIMS:  ,  , Assessment: Pt agreeable to treatment but very fatigued. Sat EOB with Min A. Pt fatigued to attempt to stand on this date. Occupational therapy: FIMS:   ,  , Assessment: Pt. is an 80year old woman from home alone who presents to [facility] with the above deficits which impact her ability to perform ADLs and IADLs. Pt would benefit from continued OT to maximize independence and safety with ADL tasks.     Speech therapy: FIMS:        Lab/X-ray studies reviewed, analyzed and discussed with patient and staff:   Recent Results (from the past 24 hour(s))   Protime-INR    Collection Time: 11/30/20  9:58 AM   Result Value Ref Range    Protime 38.8 (H) 12.3 - 14.9 sec    INR 4.0    CBC    Collection Time: 12/01/20  5:52 AM   Result Value Ref Range    WBC 8.0 4.8 - 10.8 K/uL    RBC 3.63 (L) 4.20 - 5.40 M/uL    Hemoglobin 11.2 (L) 12.0 - 16.0 g/dL    Hematocrit 35.4 (L) 37.0 - 47.0 %    MCV 97.6 82.0 - 100.0 fL    MCH 30.9 27.0 - 31.3 pg    MCHC 31.6 (L) 33.0 - 37.0 %    RDW 16.3 (H) 11.5 - 14.5 %    Platelets 223 377 - 481 K/uL   Comprehensive Metabolic Panel    Collection Time: 12/01/20  5:52 AM   Result Value Ref Range    Sodium 148 (H) 135 - 144 mEq/L    Potassium 3.5 3.4 - 4.9 mEq/L    Chloride 109 (H) 95 - 107 mEq/L    CO2 24 20 - 31 mEq/L    Anion Gap 15 9 - 15 mEq/L    Glucose 148 (H) 70 - 99 mg/dL    BUN 44 (H) 8 - 23 mg/dL    CREATININE 0.81 0.50 - 0.90 mg/dL    GFR Non-African American >60.0 >60    GFR  >60.0 >60    Calcium 10.6 (H) 8.5 - 9.9 mg/dL    Total Protein 6.8 6.3 - 8.0 g/dL    Alb 4.7 (H) 3.5 - 4.6 g/dL    Total Bilirubin 1.0 (H) 0.2 - 0.7 mg/dL    Alkaline Phosphatase 50 40 - 130 U/L    ALT 9 0 - 33 U/L    AST 11 0 - 35 U/L    Globulin 2.1 (L) 2.3 - 3.5 g/dL   Magnesium    Collection Time: 12/01/20  5:52 AM   Result Value Ref Range    Magnesium 1.9 1.7 - 2.4 mg/dL   Sedimentation Rate    Collection Time: 12/01/20  5:52 AM   Result Value Ref Range    Sed Rate 4 0 - 30 mm   High sensitivity CRP    Collection Time: 12/01/20  5:52 AM   Result Value Ref Range    CRP High Sensitivity 108.6 (H) 0.0 - 5.0 mg/L   Protime-INR    Collection Time: 12/01/20  5:52 AM   Result Value Ref Range    Protime 45.3 (H) 12.3 - 14.9 sec    INR 4.9        Previous extensive, complex labs, notes and diagnostics reviewed and analyzed. ALLERGIES:    Allergies as of 11/22/2020 - Review Complete 11/22/2020   Allergen Reaction Noted    Carbamazepine and analogs  09/13/2010    Codeine  09/13/2010      (please also verify by checking MAR)     Discussed starting vitamins with her and her dtr. Complex Physical Medicine & Rehab Issues Assess & Plan:   1.  Severe abnormality of gait and mobility and impaired self-care and ADL's secondary to progressive lumbar spinal stenosis and neurogenic bowel and bladder. Functional and medical status have deteriorated--condolences given to family--I will sign off    Complex Active General Medical Issues that complicate care Assess & Plan:     1. Active Problems:    Atrial fibrillation with rapid ventricular response (HCC)    Gait abnormality    DANNY (generalized anxiety disorder)    GERD (gastroesophageal reflux disease)    Hyperlipidemia    Major depressive disorder    Osteoporosis    Recurrent major depressive disorder, in full remission ([address]    Vitamin D deficiency    Sepsis due to urinary tract infection ([address]    Pressure injury of skin of contiguous region involving back, buttock, and hip    Constipation, acute    LFT elevation    Hyperkalemia    Severe malnutrition ([address]  Resolved Problems:    * No resolved hospital problems.  Dane Reyes D.O., PM&R     Attending    [facility]

## 2020-12-01 NOTE — PROGRESS NOTES
Calls to daughter go to voicemail. Tried calling the other listed emergency phone number as well to inform family of the change in the patients condition. Awaiting call back.  Electronically signed by Zoey Kumar RN on 12/1/2020 at 8:10 AM

## 2020-12-01 NOTE — CONSULTS
9194 Referral and assessment of pt completed. Pt appropriate for hospice services and inpatient status at hospice center. Consents signed by pt's daughter Radha Awad. Pt to be transported to [facility] today.

## 2020-12-01 NOTE — DISCHARGE SUMMARY
Discharge Summary    Patient:  Rah Elias  YOB: 1933    MRN: [id number]   Acct: [de-identified]    Primary Care Physician: Alberto Díaz date:  11/22/2020    Discharge date:   12/01/20      Discharge Diagnoses:   <principal problem not specified>  Active Problems:    Atrial fibrillation with rapid ventricular response (HCC)    Gait abnormality    DANNY (generalized anxiety disorder)    GERD (gastroesophageal reflux disease)    Hyperlipidemia    Major depressive disorder    Osteoporosis    Recurrent major depressive disorder, in full remission ([facility] [address]    Vitamin D deficiency    Sepsis due to urinary tract infection ([facility] [address]    Pressure injury of skin of contiguous region involving back, buttock, and hip    Constipation, acute    LFT elevation    Hyperkalemia    Severe malnutrition ([facility] [address]  Resolved Problems:    * No resolved hospital problems. *      Admitted for: [facility] Course: patient was admitted with A fib/RVR, was found to have leucocytosis/sepsis due to calculous GB disease. Initiated on IV atbs and was planning to have ERCP. During hospital stay her cognition were declining, developed acute respiratory failure/hypotension, was placed on BIPAP. Pt and family decided to accept hospice approach.  After completing her acute stay at [facility] will be transferred under hospice care for end of life care      Consultants:  Cardio/ID/GI    Discharge Medications:       Medication List      STOP taking these medications    Advair Diskus 250-50 MCG/DOSE Aepb  Generic drug:  fluticasone-salmeterol     ALPRAZolam 0.5 MG tablet  Commonly known as:  XANAX     metoprolol tartrate 50 MG tablet  Commonly known as:  LOPRESSOR     traMADol 50 MG tablet  Commonly known as:  ULTRAM     Vitamin D3 50 MCG (2000 UT) Caps     warfarin 5 MG tablet  Commonly known as:  COUMADIN            Physical Exam:    Vitals:  Vitals:    12/01/20 0752 12/01/20 0800 12/01/20 0823 12/01/20 0903   BP: (!) 74/50 (!) 77/47 (!) 60/41 (!) 86/57   Pulse:  75     Resp:  14     Temp:       TempSrc:       SpO2: 93% 97% 99% 99%   Weight:       Height:         Weight: Weight: 124 lb 9.6 oz (56.5 kg)(bed has been zeroed)     24 hour intake/output:    Intake/Output Summary (Last 24 hours) at 12/1/2020 1055  Last data filed at 12/1/2020 0130  Gross per 24 hour   Intake 650 ml   Output 700 ml   Net -50 ml           Radiology reports as per the Radiologist  Radiology: Xr Chest (2 Vw)    Result Date: 11/24/2020  EXAMINATION: XR CHEST (2 VW) CLINICAL HISTORY: ATRIAL FIBRILLATION. ASTHMA. PULMONARY FIBROSIS COMPARISONS: JANUARY 12, AND 1990 FINDINGS: Cephalad distraction of right humeral head with narrowing right glenohumeral joint and humeral acetabular interval again identified and unchanged. Likewise, degenerative change left glenohumeral joint with decreased interval left acetabulum and  humeral head unchanged. Impression thoracolumbar scoliosis with cephalad convexity to right in mid chest and caudal convexity and upper abdomen unchanged. Cardiopericardial silhouette is normal. Aorta calcified. Pulmonary vasculature is indistinct and diffuse subtle reticular change is found throughout the right and left lungs. No focal airspace disease is identified. SEVERE DEGENERATIVE CHANGE, BILATERAL SHOULDERS. THORACOLUMBAR SCOLIOSIS. BILATERAL LUNG CHANGES COMPATIBLE WITH CLINICAL HISTORY OF PULMONARY FIBROSIS. NO ACUTE FINDINGS. Ct Abdomen Pelvis W Iv Contrast Additional Contrast? None    Result Date: 11/22/2020  CT of the Abdomen and Pelvis with intravenous contrast medium History:  Left lower quadrant pain. Back pain. Left leg weakness. Technical Factors: CT imaging of the abdomen and pelvis were obtained and formatted as 5 mm contiguous axial images from the domes of the diaphragm to the symphysis pubis. Sagittal and coronal reconstructions were also obtained. Oral contrast medium:  None. Intravenous contrast medium:  Isovue-370, 100 mL. tension. Sigmoid diverticulosis. Fecal impaction with possible rectal stercoral colitis. Hepatic steatosis with focal fatty sparing. Spleen, lower limits of normal in size, unchanged. Small bilateral kidneys. Cholelithiasis versus high density gallbladder sludge. Severe degenerative change lumbar spine with scoliosis. Remote compression fracture, T9. Grade 1, L4, and grade one, L5, spondylolisthesis, unchanged. All CT scans at this facility use dose modulation, iterative reconstruction, and/or weight based dosing when appropriate to reduce radiation dose to as low as reasonably achievable. Results for orders placed or performed during the hospital encounter of 11/22/20   Culture, Blood 1    Specimen: Blood   Result Value Ref Range    Blood Culture, Routine No growth after 5 days of incubation. Culture, Blood 2    Specimen: Blood   Result Value Ref Range    Culture, Blood 2 No growth after 5 days of incubation. Culture, Blood 1    Specimen: Blood   Result Value Ref Range    Blood Culture, Routine No growth after 5 days of incubation. Culture, Blood 2    Specimen: Blood   Result Value Ref Range    Culture, Blood 2 No growth after 5 days of incubation.     Comprehensive Metabolic Panel   Result Value Ref Range    Sodium 135 135 - 144 mEq/L    Potassium 3.7 3.4 - 4.9 mEq/L    Chloride 99 95 - 107 mEq/L    CO2 29 20 - 31 mEq/L    Anion Gap 7 (L) 9 - 15 mEq/L    Glucose 101 (H) 70 - 99 mg/dL    BUN 22 8 - 23 mg/dL    CREATININE 0.59 0.50 - 0.90 mg/dL    GFR Non-African American >60.0 >60    GFR  >60.0 >60    Calcium 9.2 8.5 - 9.9 mg/dL    Total Protein 6.4 6.3 - 8.0 g/dL    Alb 3.1 (L) 3.5 - 4.6 g/dL    Total Bilirubin 1.0 (H) 0.2 - 0.7 mg/dL    Alkaline Phosphatase 64 40 - 130 U/L    ALT 8 0 - 33 U/L    AST 29 0 - 35 U/L    Globulin 3.3 2.3 - 3.5 g/dL   CBC Auto Differential   Result Value Ref Range    WBC 8.3 4.8 - 10.8 K/uL    RBC 4.51 4.20 - 5.40 M/uL    Hemoglobin 14.1 12.0 - 16.0 g/dL    Hematocrit 42.5 37.0 - 47.0 %    MCV 94.3 82.0 - 100.0 fL    MCH 31.2 27.0 - 31.3 pg    MCHC 33.1 33.0 - 37.0 %    RDW 14.4 11.5 - 14.5 %    Platelets 947 354 - 570 K/uL    Neutrophils % 71.7 %    Lymphocytes % 16.9 %    Monocytes % 9.9 %    Eosinophils % 0.6 %    Basophils % 0.9 %    Neutrophils Absolute 6.0 1.4 - 6.5 K/uL    Lymphocytes Absolute 1.4 1.0 - 4.8 K/uL    Monocytes Absolute 0.8 0.2 - 0.8 K/uL    Eosinophils Absolute 0.1 0.0 - 0.7 K/uL    Basophils Absolute 0.1 0.0 - 0.2 K/uL   Magnesium   Result Value Ref Range    Magnesium 1.7 1.7 - 2.4 mg/dL   Urine Reflex to Culture    Specimen: Urine, clean catch   Result Value Ref Range    Color, UA Yellow Straw/Yellow    Clarity, UA CLOUDY (A) Clear    Glucose, Ur Negative Negative mg/dL    Bilirubin Urine Negative Negative    Ketones, Urine Negative Negative mg/dL    Specific Gravity, UA 1.020 1.005 - 1.030    Blood, Urine TRACE (A) Negative    pH, UA 5.5 5.0 - 9.0    Protein, UA Negative Negative mg/dL    Urobilinogen, Urine 1.0 <2.0 E.U./dL    Nitrite, Urine POSITIVE (A) Negative    Leukocyte Esterase, Urine MODERATE (A) Negative    Urine Reflex to Culture Not Indicated    Lactic Acid, Plasma   Result Value Ref Range    Lactic Acid 1.5 0.5 - 2.2 mmol/L   Microscopic Urinalysis   Result Value Ref Range    RBC, UA 3-5 (A) 0 - 2 /HPF    Bacteria, UA MANY (A) Negative /HPF    Hyaline Casts, UA 3-5 0 - 5 /HPF    WBC, UA 6-9 (A) 0 - 5 /HPF    Epithelial Cells, UA 10-20 0 - 5 /HPF   Protime-INR   Result Value Ref Range    Protime 33.7 (H) 12.3 - 14.9 sec    INR 3.3    Troponin   Result Value Ref Range    Troponin <0.010 0.000 - 0.010 ng/mL   Troponin   Result Value Ref Range    Troponin <0.010 0.000 - 0.010 ng/mL   COVID-19   Result Value Ref Range    SARS-CoV-2, NAAT Not Detected Not Detected   CBC Auto Differential   Result Value Ref Range    WBC 7.0 4.8 - 10.8 K/uL    RBC 4.36 4.20 - 5.40 M/uL    Hemoglobin 13.8 12.0 - 16.0 g/dL    Hematocrit 42.0 37.0 - 47.0 %    MCV 96.3 82.0 - 100.0 fL    MCH 31.6 (H) 27.0 - 31.3 pg    MCHC 32.8 (L) 33.0 - 37.0 %    RDW 15.1 (H) 11.5 - 14.5 %    Platelets 875 522 - 107 K/uL    Neutrophils % 69.9 %    Lymphocytes % 18.7 %    Monocytes % 9.7 %    Eosinophils % 1.2 %    Basophils % 0.5 %    Neutrophils Absolute 4.9 1.4 - 6.5 K/uL    Lymphocytes Absolute 1.3 1.0 - 4.8 K/uL    Monocytes Absolute 0.7 0.2 - 0.8 K/uL    Eosinophils Absolute 0.1 0.0 - 0.7 K/uL    Basophils Absolute 0.0 0.0 - 0.2 K/uL   PROTIME-INR   Result Value Ref Range    Protime 27.8 (H) 12.3 - 14.9 sec    INR 2.6    Magnesium   Result Value Ref Range    Magnesium 2.0 1.7 - 2.4 mg/dL   Comprehensive Metabolic Panel   Result Value Ref Range    Sodium 136 135 - 144 mEq/L    Potassium 4.3 3.4 - 4.9 mEq/L    Chloride 103 95 - 107 mEq/L    CO2 26 20 - 31 mEq/L    Anion Gap 7 (L) 9 - 15 mEq/L    Glucose 81 70 - 99 mg/dL    BUN 15 8 - 23 mg/dL    CREATININE 0.55 0.50 - 0.90 mg/dL    GFR Non-African American >60.0 >60    GFR  >60.0 >60    Calcium 8.4 (L) 8.5 - 9.9 mg/dL    Total Protein 6.1 (L) 6.3 - 8.0 g/dL    Alb 2.7 (L) 3.5 - 4.6 g/dL    Total Bilirubin 1.5 (H) 0.2 - 0.7 mg/dL    Alkaline Phosphatase 126 40 - 130 U/L    ALT 57 (H) 0 - 33 U/L     (H) 0 - 35 U/L    Globulin 3.4 2.3 - 3.5 g/dL   Sedimentation Rate   Result Value Ref Range    Sed Rate 11 0 - 30 mm   High sensitivity CRP   Result Value Ref Range    CRP High Sensitivity 40.8 (H) 0.0 - 5.0 mg/L   COVID-19   Result Value Ref Range    SARS-CoV-2, NAAT Not Detected Not Detected   CBC Auto Differential   Result Value Ref Range    WBC 13.5 (H) 4.8 - 10.8 K/uL    RBC 4.26 4.20 - 5.40 M/uL    Hemoglobin 13.4 12.0 - 16.0 g/dL    Hematocrit 40.5 37.0 - 47.0 %    MCV 95.3 82.0 - 100.0 fL    MCH 31.5 (H) 27.0 - 31.3 pg    MCHC 33.1 33.0 - 37.0 %    RDW 14.9 (H) 11.5 - 14.5 %    Platelets 075 530 - 091 K/uL    Neutrophils % 90.2 %    Lymphocytes % 6.0 %    Monocytes % 3.7 %    Eosinophils % 0.0 % Basophils % 0.1 %    Neutrophils Absolute 12.2 (H) 1.4 - 6.5 K/uL    Lymphocytes Absolute 0.8 (L) 1.0 - 4.8 K/uL    Monocytes Absolute 0.5 0.2 - 0.8 K/uL    Eosinophils Absolute 0.0 0.0 - 0.7 K/uL    Basophils Absolute 0.0 0.0 - 0.2 K/uL   Comprehensive Metabolic Panel   Result Value Ref Range    Sodium 136 135 - 144 mEq/L    Potassium 5.0 (H) 3.4 - 4.9 mEq/L    Chloride 100 95 - 107 mEq/L    CO2 26 20 - 31 mEq/L    Anion Gap 10 9 - 15 mEq/L    Glucose 76 70 - 99 mg/dL    BUN 24 (H) 8 - 23 mg/dL    CREATININE 1.05 (H) 0.50 - 0.90 mg/dL    GFR Non-African American 49.5 (L) >60    GFR  59.9 (L) >60    Calcium 8.7 8.5 - 9.9 mg/dL    Total Protein 6.1 (L) 6.3 - 8.0 g/dL    Alb 2.7 (L) 3.5 - 4.6 g/dL    Total Bilirubin 4.9 (H) 0.2 - 0.7 mg/dL    Alkaline Phosphatase 229 (H) 40 - 130 U/L     (H) 0 - 33 U/L     (H) 0 - 35 U/L    Globulin 3.4 2.3 - 3.5 g/dL   Magnesium   Result Value Ref Range    Magnesium 2.0 1.7 - 2.4 mg/dL   PROTIME-INR   Result Value Ref Range    Protime 36.5 (H) 12.3 - 14.9 sec    INR 3.7    CBC Auto Differential   Result Value Ref Range    WBC 12.3 (H) 4.8 - 10.8 K/uL    RBC 3.60 (L) 4.20 - 5.40 M/uL    Hemoglobin 11.1 (L) 12.0 - 16.0 g/dL    Hematocrit 34.6 (L) 37.0 - 47.0 %    MCV 96.3 82.0 - 100.0 fL    MCH 30.9 27.0 - 31.3 pg    MCHC 32.1 (L) 33.0 - 37.0 %    RDW 15.2 (H) 11.5 - 14.5 %    Platelets 911 636 - 843 K/uL    Neutrophils % 91.4 %    Lymphocytes % 4.7 %    Monocytes % 3.7 %    Eosinophils % 0.0 %    Basophils % 0.2 %    Neutrophils Absolute 11.3 (H) 1.4 - 6.5 K/uL    Lymphocytes Absolute 0.6 (L) 1.0 - 4.8 K/uL    Monocytes Absolute 0.5 0.2 - 0.8 K/uL    Eosinophils Absolute 0.0 0.0 - 0.7 K/uL    Basophils Absolute 0.0 0.0 - 0.2 K/uL   PROTIME-INR   Result Value Ref Range    Protime 80.9 (H) 12.3 - 14.9 sec    INR >10.0 (HH)    Comprehensive Metabolic Panel   Result Value Ref Range    Sodium 141 135 - 144 mEq/L    Potassium 4.4 3.4 - 4.9 mEq/L    Chloride 107 95 - 107 mEq/L    CO2 22 20 - 31 mEq/L    Anion Gap 12 9 - 15 mEq/L    Glucose 92 70 - 99 mg/dL    BUN 24 (H) 8 - 23 mg/dL    CREATININE 0.92 (H) 0.50 - 0.90 mg/dL    GFR Non-African American 57.7 (L) >60    GFR  >60.0 >60    Calcium 8.5 8.5 - 9.9 mg/dL    Total Protein 6.2 (L) 6.3 - 8.0 g/dL    Alb 3.7 3.5 - 4.6 g/dL    Total Bilirubin 2.9 (H) 0.2 - 0.7 mg/dL    Alkaline Phosphatase 124 40 - 130 U/L    ALT 57 (H) 0 - 33 U/L    AST 79 (H) 0 - 35 U/L    Globulin 2.5 2.3 - 3.5 g/dL   Magnesium   Result Value Ref Range    Magnesium 2.1 1.7 - 2.4 mg/dL   Sedimentation Rate   Result Value Ref Range    Sed Rate 17 0 - 30 mm   High sensitivity CRP   Result Value Ref Range    CRP High Sensitivity 203.9 (H) 0.0 - 5.0 mg/L   Troponin   Result Value Ref Range    Troponin <0.010 0.000 - 0.010 ng/mL   Protime-INR   Result Value Ref Range    Protime 91.8 (H) 12.3 - 14.9 sec    INR >10.0 (HH)    CBC Auto Differential   Result Value Ref Range    WBC 8.5 4.8 - 10.8 K/uL    RBC 3.46 (L) 4.20 - 5.40 M/uL    Hemoglobin 10.8 (L) 12.0 - 16.0 g/dL    Hematocrit 33.5 (L) 37.0 - 47.0 %    MCV 96.8 82.0 - 100.0 fL    MCH 31.3 27.0 - 31.3 pg    MCHC 32.3 (L) 33.0 - 37.0 %    RDW 15.8 (H) 11.5 - 14.5 %    Platelets 987 378 - 524 K/uL    Neutrophils % 88.2 %    Lymphocytes % 6.3 %    Monocytes % 5.3 %    Eosinophils % 0.0 %    Basophils % 0.2 %    Neutrophils Absolute 7.5 (H) 1.4 - 6.5 K/uL    Lymphocytes Absolute 0.5 (L) 1.0 - 4.8 K/uL    Monocytes Absolute 0.5 0.2 - 0.8 K/uL    Eosinophils Absolute 0.0 0.0 - 0.7 K/uL    Basophils Absolute 0.0 0.0 - 0.2 K/uL   PROTIME-INR   Result Value Ref Range    Protime 49.1 (H) 12.3 - 14.9 sec    INR 5.5 (HH)    Comprehensive Metabolic Panel   Result Value Ref Range    Sodium 144 135 - 144 mEq/L    Potassium 4.0 3.4 - 4.9 mEq/L    Chloride 111 (H) 95 - 107 mEq/L    CO2 20 20 - 31 mEq/L    Anion Gap 13 9 - 15 mEq/L    Glucose 88 70 - 99 mg/dL    BUN 28 (H) 8 - 23 mg/dL    CREATININE 0.80 0.50 - 0.90 mg/dL    GFR Non-African American >60.0 >60    GFR  >60.0 >60    Calcium 8.9 8.5 - 9.9 mg/dL    Total Protein 6.6 6.3 - 8.0 g/dL    Alb 4.2 3.5 - 4.6 g/dL    Total Bilirubin 1.7 (H) 0.2 - 0.7 mg/dL    Alkaline Phosphatase 96 40 - 130 U/L    ALT 39 (H) 0 - 33 U/L    AST 37 (H) 0 - 35 U/L    Globulin 2.4 2.3 - 3.5 g/dL   Magnesium   Result Value Ref Range    Magnesium 2.1 1.7 - 2.4 mg/dL   Sedimentation Rate   Result Value Ref Range    Sed Rate 18 0 - 30 mm   High sensitivity CRP   Result Value Ref Range    CRP High Sensitivity 218.7 (H) 0.0 - 5.0 mg/L   CBC Auto Differential   Result Value Ref Range    WBC 8.6 4.8 - 10.8 K/uL    RBC 3.57 (L) 4.20 - 5.40 M/uL    Hemoglobin 11.1 (L) 12.0 - 16.0 g/dL    Hematocrit 34.6 (L) 37.0 - 47.0 %    MCV 97.0 82.0 - 100.0 fL    MCH 31.0 27.0 - 31.3 pg    MCHC 31.9 (L) 33.0 - 37.0 %    RDW 15.2 (H) 11.5 - 14.5 %    Platelets 902 510 - 143 K/uL    Neutrophils % 83.7 %    Lymphocytes % 7.7 %    Monocytes % 8.4 %    Eosinophils % 0.1 %    Basophils % 0.1 %    Neutrophils Absolute 7.2 (H) 1.4 - 6.5 K/uL    Lymphocytes Absolute 0.7 (L) 1.0 - 4.8 K/uL    Monocytes Absolute 0.7 0.2 - 0.8 K/uL    Eosinophils Absolute 0.0 0.0 - 0.7 K/uL    Basophils Absolute 0.0 0.0 - 0.2 K/uL   Comprehensive Metabolic Panel   Result Value Ref Range    Sodium 141 135 - 144 mEq/L    Potassium 4.3 3.4 - 4.9 mEq/L    Chloride 107 95 - 107 mEq/L    CO2 22 20 - 31 mEq/L    Anion Gap 12 9 - 15 mEq/L    Glucose 76 70 - 99 mg/dL    BUN 27 (H) 8 - 23 mg/dL    CREATININE 0.77 0.50 - 0.90 mg/dL    GFR Non-African American >60.0 >60    GFR  >60.0 >60    Calcium 9.4 8.5 - 9.9 mg/dL    Total Protein 6.3 6.3 - 8.0 g/dL    Alb 4.3 3.5 - 4.6 g/dL    Total Bilirubin 1.6 (H) 0.2 - 0.7 mg/dL    Alkaline Phosphatase 73 40 - 130 U/L    ALT 23 0 - 33 U/L    AST 21 0 - 35 U/L    Globulin 2.0 (L) 2.3 - 3.5 g/dL   Magnesium   Result Value Ref Range    Magnesium 2.0 1.7 - 2.4 mg/dL PROTIME-INR   Result Value Ref Range    Protime 25.9 (H) 12.3 - 14.9 sec    INR 2.4    CBC Auto Differential   Result Value Ref Range    WBC 7.4 4.8 - 10.8 K/uL    RBC 3.51 (L) 4.20 - 5.40 M/uL    Hemoglobin 11.0 (L) 12.0 - 16.0 g/dL    Hematocrit 34.0 (L) 37.0 - 47.0 %    MCV 96.9 82.0 - 100.0 fL    MCH 31.3 27.0 - 31.3 pg    MCHC 32.3 (L) 33.0 - 37.0 %    RDW 15.5 (H) 11.5 - 14.5 %    Platelets 436 693 - 811 K/uL    Neutrophils % 73.0 %    Lymphocytes % 13.0 %    Monocytes % 12.6 %    Eosinophils % 0.3 %    Basophils % 1.0 %    Neutrophils Absolute 5.5 1.4 - 6.5 K/uL    Lymphocytes Absolute 1.0 1.0 - 4.8 K/uL    Monocytes Absolute 1.0 (H) 0.2 - 0.8 K/uL    Eosinophils Absolute 0.0 0.0 - 0.7 K/uL    Basophils Absolute 0.1 0.0 - 0.2 K/uL    Myelocyte Percent 1 %    nRBC 1 /100 WBC    Anisocytosis 1+     Macrocytes 1+     Hypochromia 1+     Poikilocytes 1+     Target Cells 2+    Comprehensive Metabolic Panel   Result Value Ref Range    Sodium 147 (H) 135 - 144 mEq/L    Potassium 3.5 3.4 - 4.9 mEq/L    Chloride 108 (H) 95 - 107 mEq/L    CO2 23 20 - 31 mEq/L    Anion Gap 16 (H) 9 - 15 mEq/L    Glucose 88 70 - 99 mg/dL    BUN 28 (H) 8 - 23 mg/dL    CREATININE 0.78 0.50 - 0.90 mg/dL    GFR Non-African American >60.0 >60    GFR  >60.0 >60    Calcium 9.8 8.5 - 9.9 mg/dL    Total Protein 6.7 6.3 - 8.0 g/dL    Alb 4.9 (H) 3.5 - 4.6 g/dL    Total Bilirubin 1.5 (H) 0.2 - 0.7 mg/dL    Alkaline Phosphatase 58 40 - 130 U/L    ALT 15 0 - 33 U/L    AST 13 0 - 35 U/L    Globulin 1.8 (L) 2.3 - 3.5 g/dL   Magnesium   Result Value Ref Range    Magnesium 1.9 1.7 - 2.4 mg/dL   PROTIME-INR   Result Value Ref Range    Protime 25.7 (H) 12.3 - 14.9 sec    INR 2.4    Protime-INR   Result Value Ref Range    Protime 30.3 (H) 12.3 - 14.9 sec    INR 2.9    Protime-INR   Result Value Ref Range    Protime 38.8 (H) 12.3 - 14.9 sec    INR 4.0    CBC   Result Value Ref Range    WBC 8.0 4.8 - 10.8 K/uL    RBC 3.63 (L) 4.20 - 5.40 M/uL Hemoglobin 11.2 (L) 12.0 - 16.0 g/dL    Hematocrit 35.4 (L) 37.0 - 47.0 %    MCV 97.6 82.0 - 100.0 fL    MCH 30.9 27.0 - 31.3 pg    MCHC 31.6 (L) 33.0 - 37.0 %    RDW 16.3 (H) 11.5 - 14.5 %    Platelets 798 919 - 660 K/uL   Comprehensive Metabolic Panel   Result Value Ref Range    Sodium 148 (H) 135 - 144 mEq/L    Potassium 3.5 3.4 - 4.9 mEq/L    Chloride 109 (H) 95 - 107 mEq/L    CO2 24 20 - 31 mEq/L    Anion Gap 15 9 - 15 mEq/L    Glucose 148 (H) 70 - 99 mg/dL    BUN 44 (H) 8 - 23 mg/dL    CREATININE 0.81 0.50 - 0.90 mg/dL    GFR Non-African American >60.0 >60    GFR  >60.0 >60    Calcium 10.6 (H) 8.5 - 9.9 mg/dL    Total Protein 6.8 6.3 - 8.0 g/dL    Alb 4.7 (H) 3.5 - 4.6 g/dL    Total Bilirubin 1.0 (H) 0.2 - 0.7 mg/dL    Alkaline Phosphatase 50 40 - 130 U/L    ALT 9 0 - 33 U/L    AST 11 0 - 35 U/L    Globulin 2.1 (L) 2.3 - 3.5 g/dL   Magnesium   Result Value Ref Range    Magnesium 1.9 1.7 - 2.4 mg/dL   Sedimentation Rate   Result Value Ref Range    Sed Rate 4 0 - 30 mm   High sensitivity CRP   Result Value Ref Range    CRP High Sensitivity 108.6 (H) 0.0 - 5.0 mg/L   Protime-INR   Result Value Ref Range    Protime 45.3 (H) 12.3 - 14.9 sec    INR 4.9    POCT CREATININE   Result Value Ref Range    POC CREATININE WHOLE BLOOD 0.7    POCT Venous   Result Value Ref Range    POC Creatinine 0.7 0.6 - 1.2 mg/dL    GFR Non-African American >60 >60    GFR  >60 >60    Sample Type AV     Performed on SEE BELOW    POCT Arterial   Result Value Ref Range    POC Sodium 143 136 - 145 mEq/L    POC Potassium 3.2 (L) 3.5 - 5.1 mEq/L    POC Chloride 107 99 - 110 mEq/L    POC Glucose 93 60 - 115 mg/dl    POC Creatinine 0.9 0.6 - 1.2 mg/dL    GFR Non- 59 (A) >60    GFR African American >60 >60    Calcium, Ion 1.34 (H) 1.12 - 1.32 mmol/L    pH, Arterial 7.287 (L) 7.350 - 7.450    pCO2, Arterial 59 (H) 35 - 45 mm Hg    pO2, Arterial 66 (HH) 75 - 108 mm Hg    HCO3, Arterial 28.0 21.0 - 29.0 mmol/L    Base Excess, Arterial 1 -3 - 3    O2 Sat, Arterial 90 (HH) 93 - 100 %    TCO2, Arterial 30 (H) 22 - 29    Lactate 0.94 0.40 - 2.00 mmol/L    POC Hematocrit 37 36 - 48 %    Hemoglobin 12.4 12.0 - 16.0 gm/dL    FIO2 3.000     Sample Type ART     Performed on SEE BELOW    EKG 12 Lead - Chest Pain   Result Value Ref Range    Ventricular Rate 107 BPM    Atrial Rate 468 BPM    QRS Duration 74 ms    Q-T Interval 304 ms    QTc Calculation (Bazett) 405 ms    R Axis -20 degrees    T Axis -57 degrees   EKG 12 Lead   Result Value Ref Range    Ventricular Rate 94 BPM    Atrial Rate 50 BPM    QRS Duration 78 ms    Q-T Interval 398 ms    QTc Calculation (Bazett) 497 ms    R Axis -18 degrees    T Axis -24 degrees   EKG 12 Lead   Result Value Ref Range    Ventricular Rate 105 BPM    Atrial Rate 105 BPM    P-R Interval 142 ms    QRS Duration 72 ms    Q-T Interval 398 ms    QTc Calculation (Bazett) 526 ms    P Axis 30 degrees    R Axis -19 degrees    T Axis -12 degrees   EKG 12 Lead   Result Value Ref Range    Ventricular Rate 118 BPM    Atrial Rate 234 BPM    QRS Duration 80 ms    Q-T Interval 294 ms    QTc Calculation (Bazett) 412 ms    R Axis -17 degrees    T Axis 195 degrees   EKG 12 Lead   Result Value Ref Range    Ventricular Rate 90 BPM    Atrial Rate 90 BPM    P-R Interval 210 ms    QRS Duration 80 ms    Q-T Interval 368 ms    QTc Calculation (Bazett) 450 ms    P Axis 62 degrees    R Axis -20 degrees    T Axis 183 degrees   EKG 12 Lead   Result Value Ref Range    Ventricular Rate 96 BPM    Atrial Rate 394 BPM    QRS Duration 82 ms    Q-T Interval 350 ms    QTc Calculation (Bazett) 442 ms    R Axis -21 degrees    T Axis 180 degrees   TYPE AND SCREEN   Result Value Ref Range    ABO/Rh O POS     Antibody Screen NEG        Diet:  DIET GENERAL; Dysphagia Minced and Moist  Dietary Nutrition Supplements: Frozen Oral Supplement  Dietary Nutrition Supplements: Standard High Calorie Oral Supplement    Activity: Disposition: hospice    Condition: Unstable    Time Spent: 50 minutes    Electronically signed by Linda Barnett MD on 12/1/2020 at 10:55 AM    Discharging Hospitalist

## 2020-12-01 NOTE — CARE COORDINATION
Per the RN, the patient's daughter, Otf, would like to meet with hospice. The LSW met with the patient's daughter, Otf, and freedom of choice was discussed. The patient's daughter would like [facility]. The LSW called [facility] and talked to Lesvia Herron in intake with the referral.  Daisy Larkin, will call the patient's daughter regarding a meeting time/day. Electronically signed by DEVAUGHN Brunson on 12/1/20 at 10:34 AM EST    Per Daisy Larkin, the hospice social worker will meet with the family at 12:30 pm today. The hospice RN will come to the hospital at 2:30 pm. The LSW updated the  and the patient's RN.   Electronically signed by DEVAUGHN Brunson on 12/1/20 at 11:45 AM EST

## 2020-12-01 NOTE — FLOWSHEET NOTE
Met with family at bedside, daughter and son in law. Updates given on care and spoke of patient's existing code status. Family agrees they want to keep her comfortable and no invasive measures. Family would like to meet with hospice to discuss. Dr. Juan Ramon Bettencourt messages to order referral and  updated.  Electronically signed by Sobeida Webster RN on 12/1/2020 at 10:41 AM

## 2020-12-01 NOTE — CONSULTS
Palliative Care Consult Note  Patient: Murali Weber  Gender: female  YOB: 1933  Unit/Bed: D805/L875-44  CodeStatus: DNR-CC  Inpatient Treatment Team: Treatment Team: Attending Provider: Rm Garcia MD; Consulting Physician: Va Overton MD; Consulting Physician: Nura Kumar MD; Utilization Reviewer: Patricia Casey, RN; Wound Care: Ivanna Engel RN; Consulting Physician: Shayy Mendez MD; Consulting Physician: Edilberto Madison MD; Surgeon: Evan Posadas MD; Patient Care Tech: Taliasophia Gutierrez; : Apolinar Rebolledo; Registered Nurse: Marisa Kim RN; : DEVAUGHN Campbell; : Itzel Hanna RN; Patient Care Tech: Jin Alvarez  Admit Date:  11/22/2020    Chief Complaint: Patient is unresponsive    History of Presenting Illness:      Murali Weber is a 80 y.o. female on hospital day 5 with a history of Atrial fibrillation with rapid ventricular response. PMH is significant for A. fib on Coumadin, asthma, DANNY, GERD, MDD, Constipation, HLD, and OA. Patient seen and examined at bedside for goals of care discussion, code status discussion, and symptom management. She presented to the ER with complaints of bilateral lower extremity weakness and back pain. She was found to be in Afib with RVR. In the ER, her heart rate was in the 120s. Her INR was 3.3. Initial troponin negative. She has decubitus ulcer which is suspected for infection. Her UA is positive. She was started on Zosyn and vancomycin. She has been alert and oriented x3 per her chart up until this morning when patient became unresponsive. She was placed on BIPAP and not intubated due to her code status being DNR CC. Patient observed laying in bed. She has her BIPAP on. She is unresponsive. She appears to be in NAD. Her daughter and son-in-law is at bedside. Patient's daughter has requested hospice consultation as she wants to respect her mother's wishes.     Review of Systems:       Review of Systems   Unable to perform ROS: Patient unresponsive       Physical Examination:       BP (!) 86/57   Pulse 75   Temp 97.4 °F (36.3 °C) (Oral)   Resp 14   Ht 5' 4\" (1.626 m)   Wt 124 lb 9.6 oz (56.5 kg) Comment: bed has been zeroed  LMP  (LMP Unknown)   SpO2 99%   BMI 21.39 kg/m²    Physical Exam  Constitutional:       Appearance: She is obese. HENT:      Head: Normocephalic. Nose: Nose normal.      Mouth/Throat:      Pharynx: No oropharyngeal exudate. Eyes:      General: No scleral icterus. Left eye: No discharge. Neck:      Musculoskeletal: Neck supple. Cardiovascular:      Rate and Rhythm: Normal rate. Pulmonary:      Effort: No respiratory distress. Breath sounds: No wheezing. Abdominal:      Palpations: Abdomen is soft. Musculoskeletal:         General: Swelling present. Skin:     General: Skin is warm and dry. Capillary Refill: Capillary refill takes less than 2 seconds. Coloration: Skin is pale. Neurological:      Mental Status: She is unresponsive. Motor: Weakness present. Allergies:       Allergies   Allergen Reactions    Carbamazepine And Analogs     Codeine        Medications:      Current Facility-Administered Medications   Medication Dose Route Frequency Provider Last Rate Last Dose    dextrose 5 % and 0.9 % sodium chloride infusion   Intravenous Continuous David Cloud MD 50 mL/hr at 12/01/20 0943      melatonin tablet 3 mg  3 mg Oral Nightly PRN Osei Jean DO        polyethylene glycol (GLYCOLAX) packet 17 g  17 g Oral BID David Cloud MD   Stopped at 12/01/20 8279    senna (SENOKOT) 8.8 MG/5ML syrup 8.8 mg  5 mL Oral BID David Cloud MD   Stopped at 12/01/20 9213    amiodarone (CORDARONE) tablet 200 mg  200 mg Oral BID Stevan Runner, MD   Stopped at 12/01/20 6463    Vitamin D (CHOLECALCIFEROL) tablet 2,000 Units  2,000 Units Oral Dinner Misa Noe DO   2,000 Units at 11/30/20 3475    cyanocobalamin injection 1,000 mcg  1,000 mcg Intramuscular Weekly Misa Scullin, DO   1,000 mcg at 11/26/20 1701    coenzyme Q10 capsule 100 mg  100 mg Oral Daily Misa Scullin, DO   Stopped at 12/01/20 0811    lidocaine 4 % external patch 3 patch  3 patch Transdermal Daily Misa Scullin, DO   2 patch at 11/30/20 1036    metoprolol tartrate (LOPRESSOR) tablet 12.5 mg  12.5 mg Oral BID Carolyne Raza MD   Stopped at 12/01/20 0812    albuterol (PROVENTIL) nebulizer solution 2.5 mg  2.5 mg Nebulization Q4H PRN Wilhelmena Stefania Sedar, DO   2.5 mg at 11/29/20 1256    lactulose (CHRONULAC) 10 GM/15ML solution 30 g  30 g Oral BID Misa Scullin, DO   Stopped at 12/01/20 0812    magnesium hydroxide (MILK OF MAGNESIA) 400 MG/5ML suspension 15 mL  15 mL Oral Daily PRN Misa Scullin, DO        docusate sodium (COLACE) capsule 100 mg  100 mg Oral Daily Misa Scullin, DO   Stopped at 12/01/20 0811    bisacodyl (DULCOLAX) suppository 10 mg  10 mg Rectal Daily PRN Misa Scullin, DO   10 mg at 11/30/20 1036    piperacillin-tazobactam (ZOSYN) 3.375 g in dextrose 5 % 50 mL IVPB extended infusion (mini-bag)  3.375 g Intravenous Vinh Jeffrey MD 12.5 mL/hr at 12/01/20 0817 3.375 g at 12/01/20 0817    sodium chloride flush 0.9 % injection 10 mL  10 mL Intravenous 2 times per day Rashaad Cheng MD   Stopped at 12/01/20 0813    sodium chloride flush 0.9 % injection 10 mL  10 mL Intravenous PRN Rashaad Cheng MD        acetaminophen (TYLENOL) tablet 650 mg  650 mg Oral Q6H PRN Rashaad Cheng MD   650 mg at 11/23/20 2018    Or    acetaminophen (TYLENOL) suppository 650 mg  650 mg Rectal Q6H PRN Rashaad Cheng MD        polyethylene glycol (GLYCOLAX) packet 17 g  17 g Oral Daily PRN Rashaad Cheng MD        promethazine (PHENERGAN) tablet 12.5 mg  12.5 mg Oral Q6H PRN Rashaad Cheng MD        Or    ondansetron [facility]) injection 4 mg  4 mg Intravenous Q6H PRN Rashaad Cheng MD   4 mg at 11/25/20 1484    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                                    Family Hx:  Family History   Problem Relation Age of Onset    High Blood Pressure Mother     Osteoporosis Mother     Other Brother        LABS: Reviewed     CBC:  Lab Results   Component Value Date    WBC 8.0 12/01/2020    RBC 3.63 12/01/2020    HGB 11.2 12/01/2020    HCT 35.4 12/01/2020    MCV 97.6 12/01/2020    MCH 30.9 12/01/2020    MCHC 31.6 12/01/2020    RDW 16.3 12/01/2020     12/01/2020    MPV 7.6 05/25/2014     CBC with Differential:   Lab Results   Component Value Date    WBC 8.0 12/01/2020    RBC 3.63 12/01/2020    HGB 11.2 12/01/2020    HCT 35.4 12/01/2020     12/01/2020    MCV 97.6 12/01/2020    MCH 30.9 12/01/2020    MCHC 31.6 12/01/2020    RDW 16.3 12/01/2020    NRBC 1 11/28/2020    BANDSPCT 11 01/13/2020    LYMPHOPCT 13.0 11/28/2020    MONOPCT 12.6 11/28/2020    MYELOPCT 1 11/28/2020    BASOPCT 1.0 11/28/2020    MONOSABS 1.0 11/28/2020    LYMPHSABS 1.0 11/28/2020    EOSABS 0.0 11/28/2020    BASOSABS 0.1 11/28/2020     CMP:    Lab Results   Component Value Date     12/01/2020    K 3.5 12/01/2020    K 4.6 01/13/2020     12/01/2020    CO2 24 12/01/2020    BUN 44 12/01/2020    CREATININE 0.81 12/01/2020    GFRAA >60.0 12/01/2020    LABGLOM >60.0 12/01/2020    GLUCOSE 148 12/01/2020    PROT 6.8 12/01/2020    LABALBU 4.7 12/01/2020    CALCIUM 10.6 12/01/2020    BILITOT 1.0 12/01/2020    ALKPHOS 50 12/01/2020    AST 11 12/01/2020    ALT 9 12/01/2020     BMP:    Lab Results   Component Value Date     12/01/2020    K 3.5 12/01/2020    K 4.6 01/13/2020     12/01/2020    CO2 24 12/01/2020    BUN 44 12/01/2020    LABALBU 4.7 12/01/2020    CREATININE 0.81 12/01/2020    CALCIUM 10.6 12/01/2020    GFRAA >60.0 12/01/2020    LABGLOM >60.0 12/01/2020    GLUCOSE 148 12/01/2020     TSH:   Lab Results   Component Value Date    TSH 4.760 05/24/2014 Vitamin B12 and Folate: No components found for: FOLIC,  S07  Urinalysis:   Lab Results   Component Value Date    NITRU POSITIVE 11/22/2020    WBCUA 6-9 11/22/2020    BACTERIA MANY 11/22/2020    RBCUA 3-5 11/22/2020    BLOODU TRACE 11/22/2020    SPECGRAV 1.020 11/22/2020    GLUCOSEU Negative 11/22/2020           FUNCTIONAL ADL´S:     Independent: [ X ] Eating, [ X  ] Dressing, [  X ] Transferring, [  X ] Toileting, [  X ] Bathing, [ X  ] Continence  Dependent   : [  ] Eating, [   ] Dressing, [   ] Transferring, [   ] [place], [   ] Bathing, [   ] Continence  W. assistant : [  ] Eating, [   ] Dressing, [   ] Transferring, [   ] [place], [   ] [place], [   ] Continence    Radiology: Reviewed     Xr Chest (2 Vw)     Result Date: 11/25/2020  EXAM: CHEST, 2 VIEWS CLINICAL HISTORY:  PNA  COMPARISONS:  NONE AVAILABLE  Findings: Cardiac silhouette is within normal limits. The aorta is ectatic. R this is a low inspiratory volumes with bronchovascular crowding. There is fullness of the right hilum which may be projectional. There are diffuse increased reticular markings throughout both lungs. The visualized bones are demineralized without displaced fractures. There is severe scoliosis of thoracolumbar spine and there are severe degenerative changes of both shoulders.      Low inspiratory lung show probable retrocardiac and chronic increase and show markings in both lungs. There are no large infiltrates or consolidations.       Xr Chest (2 Vw)     Result Date: 11/24/2020  EXAMINATION: XR CHEST (2 VW) CLINICAL HISTORY: ATRIAL FIBRILLATION. ASTHMA. PULMONARY FIBROSIS COMPARISONS: JANUARY 12, AND 1990 FINDINGS: Cephalad distraction of right humeral head with narrowing right glenohumeral joint and humeral acetabular interval again identified and unchanged. Likewise, degenerative change left glenohumeral joint with decreased interval left acetabulum and  humeral head unchanged.  Impression thoracolumbar scoliosis with rectal vault, with minimal wall thickening, and perirectal fat stranding. Diverticular change, sigmoid colon. Peritoneum:  No ascites, free air, or fluid collections. Vessels: Aorta normal in course and caliber. Portal vein, splenic vein, superior mesenteric vein are patent. Lymph nodes:  Retroperitoneal:  No enlarged retroperitoneal lymph nodes. Mesenteric:  No enlarged mesenteric lymph nodes. Pelvic: No enlarged pelvic lymph nodes. Ureters: Normal in course and caliber. No calcifications. Bladder: No wall thickening. Reproductive organs: No pelvic masses. Abdominal Wall:  No hernia identified. No diastasis of rectus musculature. No edema or masses. Bones:  No bone lesions. Compression fracture, T9, unchanged. 6 mm anterolisthesis L4 on L5. 6 mm anterolisthesis L5 on S1. Diffuse disc space narrowing L5-S1. Diffuse disc space narrowing T9-10, through L2-L3. Lumbar scoliosis convexity to left apex L1-L2. Right femoral intramedullary zaina. Left femoral intramedullary zaina with femoral neck tension.      Sigmoid diverticulosis. Fecal impaction with possible rectal stercoral colitis. Hepatic steatosis with focal fatty sparing. Spleen, lower limits of normal in size, unchanged. Small bilateral kidneys. Cholelithiasis versus high density gallbladder sludge. Severe degenerative change lumbar spine with scoliosis. Remote compression fracture, T9. Grade 1, L4, and grade one, L5, spondylolisthesis, unchanged. All CT scans at this facility use dose modulation, iterative reconstruction, and/or weight based dosing when appropriate to reduce radiation dose to as low as reasonably achievable    Xr Chest Portable     Result Date: 11/26/2020  EXAMINATION: XR CHEST PORTABLE CLINICAL HISTORY: HYPOXIA. RESPIRATORY FAILURE COMPARISONS: NOVEMBER 25, 2020, NOVEMBER 20, 2020 FINDINGS: Patient leaning to right. Low lung volumes. Osteopenia. Bilateral degenerative changes of the shoulders. Elevation right diaphragm unchanged.  Ill-defined area of increased opacity right hilum, and right mid and lower lung. Left lung clear.      RIGHT MID AND RIGHT LOWER LUNG ATELECTASIS/PNEUMONIA. FINDINGS UNCHANGED.     Mri Abdomen W Wo Contrast Mrcp     Result Date: 11/27/2020  EXAM: MRI ABDOMEN W WO CONTRAST MRCP HISTORY: Choledocholithiasis. TECHNIQUE: Multiplanar multisequence MRI of the abdomen was performed without and with contrast including 2-D and 3-D MRCP imaging. COMPARISON: Ultrasound from November 24, 2020 and CT of the abdomen and pelvis from November 22, 2020 FINDINGS: Examination is degraded by motion artifact. There appears to be an 8 mm filling defect within the distal common bile duct. A possible second smaller filling defect is questioned. The common bile duct is dilated measuring up to 11 mm in diameter. Mild intrahepatic biliary dilatation. The gallbladder contains numerous small gallstones. The gallbladder wall appears mildly thickened measuring approximately 4 mm in thickness. There is a small amount of pericholecystic fluid. There is a small amount of peripancreatic fluid about the pancreatic head/uncinate process. A small amount of fluid is also present along the duodenum. No enhancing pancreatic mass. The pancreatic duct is not dilated. 2 small hepatic cysts are identified. No enhancing liver lesion. The kidneys enhance uniformly. No hydronephrosis. No upper abdominal lymphadenopathy identified. Bone marrow signal appears normal. Small bilateral pleural effusions with adjacent atelectasis.      One if not two stones within the distal common bile measuring up to 8 mm duct, resulting in dilation of the common bile duct up to 11 mm and mild intrahepatic biliary dilatation. Cholelithiasis, gallbladder wall thickening, and a small amount of pericholecystic fluid is concerning for acute cholecystitis.  A small amount of fluid along the duodenum and head/uncinate process of the pancreas may be due to acute cholecystitis however correlation for pancreatitis is recommended.      Us Abdomen Limited Specify Organ? Liver, Gallbladder     Result Date: 11/24/2020  US ABDOMEN LIMITED, US DUP ABD PEL RETRO SCROT COMPLETE : 11/24/2020 CLINICAL HISTORY:  r/o cholecystitis, bile duct obstruction, portal vein thrombosis . COMPARISON: CT abdomen and pelvis with contrast 11/22/2020. TECHNIQUE: Transabdominal ultrasound of the right upper quadrant was performed, with color and waveform Doppler analysis. FINDINGS: The study is limited by the patient's high liver positioning and large amount of upper abdominal bowel gas. Numerous gallstones are noted within a mildly distended gallbladder, with borderline gallbladder wall thickening. There is mild predominantly extrahepatic biliary dilatation, with the common duct measuring approximately 1 cm in caliber at the mateo hepatis. With review of the previous CT, there is a suggestion of faintly dense distal common duct choledocholithiasis near the papilla. Further evaluation with MRCP is suggested, as clinically warranted. The visualized liver is unremarkable, with patent portal and hepatic veins demonstrating normal waveforms and flow direction. The hepatic artery is patent. There is no ascites or other findings of concern identified.      EXTENSIVE CHOLELITHIASIS WITH FINDINGS SUGGESTIVE OF ACUTE CHOLECYSTITIS. MILD PREDOMINANTLY EXTRAHEPATIC BILIARY DILATATION, AND SUSPICION OF CHOLEDOCHOLITHIASIS. FURTHER EVALUATION WITH MRCP IS SUGGESTED; AS CLINICALLY WARRANTED. UNREMARKABLE RIGHT UPPER QUADRANT DOPPLER ULTRASOUND.      Us Dup Abd Pel Retro Scrot Complete     Result Date: 11/24/2020  US ABDOMEN LIMITED, US DUP ABD PEL RETRO SCROT COMPLETE : 11/24/2020 CLINICAL HISTORY:  r/o cholecystitis, bile duct obstruction, portal vein thrombosis . COMPARISON: CT abdomen and pelvis with contrast 11/22/2020. TECHNIQUE: Transabdominal ultrasound of the right upper quadrant was performed, with color and waveform Doppler analysis. Planning  Discussed goals of care with patient. Explained in extensive detail nuances between full code, DNR CCA and DNR CC. Patient has made the decision to be  DNR CC      -Goals of Care Discussion:  Disease process and goals of treatment were discussed in basic terms. Per Malvin Chiu, Patient's daughterNikki's goal is to optimize available comfort care measures to decrease hospitalization and manage symptomology. We discussed all care options contingent on treatment response and QOL. Much active listening, presence, and emotional support were given. Malvin Chiu has requested hospice consultation at this time. She is aware that once the BIPAP has been removed that her mother may  before she is transferred to the hospice unit.     Electronically signed by Titus Bumpers, APRN - CNP on 2020 at 10:40 AM

## 2020-12-01 NOTE — PROGRESS NOTES
Infectious Disease     Patient Name: Amparo Cervantes  Date: 12/1/2020  YOB: 1933  Medical Record Number: [id number]            acute cholecystitis       Review of Systems   Respiratory: Negative. Cardiovascular: Negative. Gastrointestinal: Positive for abdominal pain. Negative for nausea and vomiting. Skin: Positive for wound. Physical Exam   Constitutional: She is oriented to person, place, and time. Cardiovascular: Normal heart sounds. No murmur heard. Pulmonary/Chest: Breath sounds normal. No respiratory distress. She has no wheezes. She has no rales. She exhibits no tenderness. Abdominal: Soft. Bowel sounds are normal. She exhibits no distension. There is abdominal tenderness. There is no rebound and no guarding. Neurological: She is alert and oriented to person, place, and time. Blood pressure (!) 86/57, pulse 75, temperature 97.4 °F (36.3 °C), temperature source Oral, resp. rate 14, height 5' 4\" (1.626 m), weight 124 lb 9.6 oz (56.5 kg), SpO2 99 %. .   Lab Results   Component Value Date    WBC 8.0 12/01/2020    HGB 11.2 (L) 12/01/2020    HCT 35.4 (L) 12/01/2020    MCV 97.6 12/01/2020     12/01/2020     Lab Results   Component Value Date     12/01/2020    K 3.5 12/01/2020    K 4.6 01/13/2020     12/01/2020    CO2 24 12/01/2020    BUN 44 12/01/2020    CREATININE 0.81 12/01/2020    GLUCOSE 148 12/01/2020    CALCIUM 10.6 12/01/2020          CT of the Abdomen and Pelvis with intravenous contrast medium         History:  Left lower quadrant pain. Back pain. Left leg weakness.         Technical Factors:         CT imaging of the abdomen and pelvis were obtained and formatted as 5 mm contiguous axial images from the domes of the diaphragm to the symphysis pubis.  Sagittal and coronal reconstructions were also obtained.         Oral contrast medium:  None.     Intravenous contrast medium:  Isovue-370, 100 mL.         Comparison:  CT abdomen pelvis, January 13, 2020.         Findings:         Lungs:  Lung bases show emphysematous change with minimal to mild dependent subsegmental atelectatic change.         Liver:  Normal in size, shape, and decreased in attenuation with focal fatty sparing. 5 mm cystlike area dome, right hepatic lobe (series 2, image 16).      Bile Ducts:  Normal in caliber.         Gallbladder:  Distended, with high attenuation sludge, or punctate calculi in dependent portion of gallbladder. No para cholecystic fluid. No gallbladder wall thickening.         Pancreas:  Normal without masses, cysts, ductal dilatation or calcification.         Spleen:  Lower limits of normal in size, unchanged, without masses or calcifications.  No splenules.         Kidneys:  Cranial caudal dimensions, right and left kidneys, measures 7.6 cm, and 7.2 cm, respectively. No hydronephrosis, masses, or stones.         Adrenals:  Normal.         Small bowel:  Normal in caliber.         Appendix:  Normal.         Colon:  Copious stool, rectal vault, with minimal wall thickening, and perirectal fat stranding. Diverticular change, sigmoid colon.         Peritoneum:  No ascites, free air, or fluid collections.         Vessels:  Aorta normal in course and caliber.   Portal vein, splenic vein, superior mesenteric vein are patent.         Lymph nodes:           Retroperitoneal:  No enlarged retroperitoneal lymph nodes. Mesenteric:  No enlarged mesenteric lymph nodes. Pelvic: No enlarged pelvic lymph nodes.         Ureters: Normal in course and caliber. No calcifications.         Bladder: No wall thickening.         Reproductive organs: No pelvic masses.         Abdominal Wall:  No hernia identified.     No diastasis of rectus musculature.       No edema or masses.         Bones:  No bone lesions. Compression fracture, T9, unchanged. 6 mm anterolisthesis L4 on L5. 6 mm anterolisthesis L5 on S1. Diffuse disc space narrowing L5-S1.  Diffuse disc space signal appears normal.         Small bilateral pleural effusions with adjacent atelectasis.           Impression         One if not two stones within the distal common bile measuring up to 8 mm duct, resulting in dilation of the common bile duct up to 11 mm and mild intrahepatic biliary dilatation.         Cholelithiasis, gallbladder wall thickening, and a small amount of pericholecystic fluid is concerning for acute cholecystitis.         A small amount of fluid along the duodenum and head/uncinate process of the pancreas may be due to acute cholecystitis however correlation for pancreatitis is recommended.            ASSESSMENT:  PLAN:    acute cholecystitis   On Zosyn    discontinue if patient goes to hospice

## 2020-12-01 NOTE — PROGRESS NOTES
bilaterally. Skin: Skin color, texture, turgor normal.  No rashes or lesions. Neurologic:non responsive   Psychiatric:NOT  Alert and oriented,       Labs:   Recent Labs     12/01/20  0552   WBC 8.0   HGB 11.2*   HCT 35.4*        Recent Labs     12/01/20  0552   *   K 3.5   *   CO2 24   BUN 44*   CREATININE 0.81   CALCIUM 10.6*     Recent Labs     12/01/20  0552   AST 11   ALT 9   BILITOT 1.0*   ALKPHOS 50     Recent Labs     11/28/20  1343 11/30/20  0958 12/01/20  0552   INR 2.9 4.0 4.9     No results for input(s): Hailee Richar in the last 72 hours. Urinalysis:      Lab Results   Component Value Date    NITRU POSITIVE 11/22/2020    WBCUA 6-9 11/22/2020    BACTERIA MANY 11/22/2020    RBCUA 3-5 11/22/2020    BLOODU TRACE 11/22/2020    SPECGRAV 1.020 11/22/2020    GLUCOSEU Negative 11/22/2020       Radiology:  XR CHEST PORTABLE   Final Result   RIGHT MID AND RIGHT LOWER LUNG ATELECTASIS/PNEUMONIA. FINDINGS UNCHANGED. MRI ABDOMEN W WO CONTRAST MRCP   Final Result      One if not two stones within the distal common bile measuring up to 8 mm duct, resulting in dilation of the common bile duct up to 11 mm and mild intrahepatic biliary dilatation. Cholelithiasis, gallbladder wall thickening, and a small amount of pericholecystic fluid is concerning for acute cholecystitis. A small amount of fluid along the duodenum and head/uncinate process of the pancreas may be due to acute cholecystitis however correlation for pancreatitis is recommended. XR CHEST (2 VW)   Final Result   Low inspiratory lung show probable retrocardiac and chronic increase and show markings in both lungs. There are no large infiltrates or consolidations. US ABDOMEN LIMITED Specify organ? LIVER, GALLBLADDER   Final Result      EXTENSIVE CHOLELITHIASIS WITH FINDINGS SUGGESTIVE OF ACUTE CHOLECYSTITIS.       MILD PREDOMINANTLY EXTRAHEPATIC BILIARY DILATATION, AND SUSPICION OF CHOLEDOCHOLITHIASIS. FURTHER EVALUATION WITH MRCP IS SUGGESTED; AS CLINICALLY WARRANTED. UNREMARKABLE RIGHT UPPER QUADRANT DOPPLER ULTRASOUND. US DUP ABD PEL RETRO SCROT COMPLETE   Final Result      EXTENSIVE CHOLELITHIASIS WITH FINDINGS SUGGESTIVE OF ACUTE CHOLECYSTITIS. MILD PREDOMINANTLY EXTRAHEPATIC BILIARY DILATATION, AND SUSPICION OF CHOLEDOCHOLITHIASIS. FURTHER EVALUATION WITH MRCP IS SUGGESTED; AS CLINICALLY WARRANTED. UNREMARKABLE RIGHT UPPER QUADRANT DOPPLER ULTRASOUND. RADIOLOGY REPORT   Final Result      XR CHEST (2 VW)   Final Result   SEVERE DEGENERATIVE CHANGE, BILATERAL SHOULDERS. THORACOLUMBAR SCOLIOSIS. BILATERAL LUNG CHANGES COMPATIBLE WITH CLINICAL HISTORY OF PULMONARY FIBROSIS. NO ACUTE FINDINGS. CT ABDOMEN PELVIS W IV CONTRAST Additional Contrast? None   Final Result      Sigmoid diverticulosis. Fecal impaction with possible rectal stercoral colitis. Hepatic steatosis with focal fatty sparing. Spleen, lower limits of normal in size, unchanged. Small bilateral kidneys. Cholelithiasis versus high density gallbladder sludge. Severe degenerative change lumbar spine with scoliosis. Remote compression fracture, T9. Grade 1, L4, and grade one, L5, spondylolisthesis, unchanged. All CT scans at this facility use dose modulation, iterative reconstruction, and/or weight based dosing when appropriate to reduce radiation dose to as low as reasonably achievable.                FL CHOLANGIOGRAM OR    (Results Pending)           Assessment/Plan:    Active Hospital Problems    Diagnosis Date Noted    Severe malnutrition ([address] [E43] 11/30/2020    Sepsis due to urinary tract infection ([address] [A41.9, N39.0] 11/24/2020    Pressure injury of skin of contiguous region involving back, buttock, and hip [L89.40] 11/24/2020    Constipation, acute [K59.00] 11/24/2020    LFT elevation [R79.89] 11/24/2020    Hyperkalemia [E87.5] 11/24/2020    Gait abnormality [R26.9] 11/23/2020    DANNY (generalized anxiety disorder) [F41.1] 11/23/2020    GERD (gastroesophageal reflux disease) [K21.9] 11/23/2020    Hyperlipidemia [E78.5] 11/23/2020    Major depressive disorder [F32.9] 11/23/2020    Vitamin D deficiency [E55.9] 11/23/2020    Osteoporosis [M81.0] 11/23/2020    Atrial fibrillation with rapid ventricular response ([facility] [I48.91] 11/22/2020    Recurrent major depressive disorder, in full remission ([address] [F33.42] 07/03/2018         DVT Prophylaxis:   Diet: DIET GENERAL; Dysphagia Minced and Moist  Dietary Nutrition Supplements: Frozen Oral Supplement  Dietary Nutrition Supplements: Standard High Calorie Oral Supplement  Code Status: DNR-CC    PT/OT Eval Status:     Dispo -  Pt became non responsive today AM, hypotensive, placed on BIPAP, IV hydration with bolus. Will try to contact family regarding those changes    Metabolic encephalopathy, confusion   Dyspnea, acute respiratory failure- O2, resp Tx, pulmonary on case, on BIPAP now  Sepsis/cholecystitis- atbs per ID,    PILY- resolved, follow up with BMP AM< continue with mild hydration   A fib- post cardioversion, coumadin on hold  Cholecystitis with choledocholithiasis- pain controlled, atbs  Pt is critically ill, overall poor prognosis.  Will follow along with consultants       Electronically signed by Won Barahona MD on 12/1/2020 at 8:41 AM

## 2020-12-01 NOTE — PROGRESS NOTES
[facility] Progress Note        Date:   2020    Patient:    Stan Alexander    :    1933  CSN:    [id number]    Rounding Cardiologist: Coby Wagner MD     Primary Cardiologist: Venancio Bergeron MD [facility]    Requesting Physician:  Domi Michael MD      Reason for Consult:  Afib w RVR. Subjective: Made DNR CC by family over weekend. TAKING TURN FOR THE WORST. Now on BIPAP. Spoke with family. Hospice referral in place. Will honor their request.    14 system General and Cardiac ROS otherwise negative or unchanged. Assessment:    1. Hypoxia  2. PNA  3. Bilateral lower extremity weakness, progressive  4. Lower back pain  5. Elevated LFTs / Abdominal discomfort. 6. Cholecystitis. 7. Urinary tract infection  8. Sepsis  9. Permanent atrial fibrillation, with RVR , Post Cardioversion , Now back in AFIB. 10. Long-term anticoagulation, current Coumadin  11. Negative Myoview perfusion stress test 10/2019,  12. Normal LV systolic function by echocardiogram 10/2019, ejection fraction 66%, Same by echo . 13. Aortic valve stenosis, mild, V-max 1.8 m/s. Same by Echo . 14. Pulmonary fibrosis   15. Asthma  16. Mild pulmonary hypertension RVSP 43 last.  17. Past smoker  18. Hypothyroidism  19. GERD  20. Depression  21. Overweight  22. Degenerative joint disease, lumbar spine stenosis with neuropathy. 23. DNR CCA STATUS  24. Otherwise as prior documentation. Plan:    1. Cardiac Supportive Care  2. Primary care to treat underlying PNA / urinary tract infection / cholecystitis and medical needs. 3. ID care with ABX Rx.  4. Rate Control for AFIB. 5. Resume Coumadin unless other procedures are warranted, hospital pharmacy to manage. 6. Continue prior home cardiovascular medications with appropriate adjustments for heart rate and blood pressure as needed and tolerated. 7. Serial telemetry. 8. Serial laboratories including troponins.   9. Hospice possible terminal wean.  10. DNR CCA  11. Call if needed. 12. See Orders    ++++++++++++++++++++++++++++++++++    HISTORY OF PRESENT ILLNESS:      Lacey Hernandez is a pleasant 80 y.o. female with with the above-noted past cardiac and medical history including permanent atrial fibrillation on chronic Coumadin anticoagulation, mild aortic valve stenosis, preserved left ventricular function, pulmonary fibrosis, asthma who now presents with bilateral lower extremity weakness and back pain. Patient has had progression of her symptomatology over the last several years but more recently over the last month. She feels too weak to get up and do her normal chores. She cannot climb steps now. She lives alone. She came in for evaluation through the emergency room. She was noted to have rapid atrial fibrillation. INR was 3.3. Initial troponin was negative. She was found to have urinary tract infection. She was admitted for further evaluation and possible placement. She is currently in isolation precautions. Cardiology was asked to see for further relation consultation. Patient Follows with Lynsey Ellison MD [facility]. See his last note attached below. Patient History and Records, EMR reviewed. Patient Interviewed and examined. Fair historian. Denies CP, SOB, LH, Dizziness, TIA or CVA Symptoms. No Orthopnea, Edema or CHF symptoms. No Palpitations. No Syncope. No Fever, Chills or Cold symptoms. No GI or Bleeding complaints. Cardiac and general ROS otherwise negative. [address] otherwise negative other than noted.       Past Medical History:   Diagnosis Date    Anticoagulated on Coumadin     Arthritis     Asthma     Atrial fibrillation (HCC)     History of blood transfusion     Pneumonia        Past Surgical History:   Procedure Laterality Date    COLONOSCOPY      JOINT REPLACEMENT Bilateral     bilateral femur replacements       Prior to Admission medications    Not on File       Scheduled Meds:   polyethylene glycol  17 g Oral BID    senna  5 mL Oral BID    amiodarone  200 mg Oral BID    Vitamin D  2,000 Units Oral Dinner    cyanocobalamin  1,000 mcg Intramuscular Weekly    coenzyme Q10  100 mg Oral Daily    lidocaine  3 patch Transdermal Daily    metoprolol tartrate  12.5 mg Oral BID    lactulose  30 g Oral BID    docusate sodium  100 mg Oral Daily    piperacillin-tazobactam  3.375 g Intravenous Q8H    sodium chloride flush  10 mL Intravenous 2 times per day     Continuous Infusions:   dextrose 5 % and 0.9 % NaCl 50 mL/hr at 12/01/20 0943     PRN Meds:melatonin, albuterol, magnesium hydroxide, bisacodyl, sodium chloride flush, acetaminophen **OR** acetaminophen, polyethylene glycol, promethazine **OR** ondansetron, metoprolol    Allergies   Allergen Reactions    Carbamazepine And Analogs     Codeine        Social History     Socioeconomic History    Marital status:      Spouse name: Not on file    Number of children: 2    Years of education: 15    Highest education level: Associate degree: occupational, technical, or vocational program   Occupational History    Occupation: retired RN -[employer]   Social Needs    Financial resource strain: Not on file    Food insecurity     Worry: Never true     Inability: Never true    Transportation needs     Medical: No     Non-medical: No   Tobacco Use    Smoking status: Former Smoker     Packs/day: 0.25     Types: Cigarettes    Smokeless tobacco: Never Used    Tobacco comment: Quit smoking at 18yrs of age   Substance and Sexual Activity    Alcohol use: Not Currently     Comment: occasional    Drug use: Not Currently    Sexual activity: Not Currently     Partners: Male   Lifestyle    Physical activity     Days per week: 0 days     Minutes per session: 0 min    Stress:  Only a little   Relationships    Social connections     Talks on phone: More than three times a week     Gets together: More than three times a week     Attends [facility]    Labs:  Recent Results (from the past 24 hour(s))   CBC    Collection Time: 12/01/20  5:52 AM   Result Value Ref Range    WBC 8.0 4.8 - 10.8 K/uL    RBC 3.63 (L) 4.20 - 5.40 M/uL    Hemoglobin 11.2 (L) 12.0 - 16.0 g/dL    Hematocrit 35.4 (L) 37.0 - 47.0 %    MCV 97.6 82.0 - 100.0 fL    MCH 30.9 27.0 - 31.3 pg    MCHC 31.6 (L) 33.0 - 37.0 %    RDW 16.3 (H) 11.5 - 14.5 %    Platelets 801 869 - 376 K/uL   Comprehensive Metabolic Panel    Collection Time: 12/01/20  5:52 AM   Result Value Ref Range    Sodium 148 (H) 135 - 144 mEq/L    Potassium 3.5 3.4 - 4.9 mEq/L    Chloride 109 (H) 95 - 107 mEq/L    CO2 24 20 - 31 mEq/L    Anion Gap 15 9 - 15 mEq/L    Glucose 148 (H) 70 - 99 mg/dL    BUN 44 (H) 8 - 23 mg/dL    CREATININE 0.81 0.50 - 0.90 mg/dL    GFR Non-African American >60.0 >60    GFR  >60.0 >60    Calcium 10.6 (H) 8.5 - 9.9 mg/dL    Total Protein 6.8 6.3 - 8.0 g/dL    Alb 4.7 (H) 3.5 - 4.6 g/dL    Total Bilirubin 1.0 (H) 0.2 - 0.7 mg/dL    Alkaline Phosphatase 50 40 - 130 U/L    ALT 9 0 - 33 U/L    AST 11 0 - 35 U/L    Globulin 2.1 (L) 2.3 - 3.5 g/dL   Magnesium    Collection Time: 12/01/20  5:52 AM   Result Value Ref Range    Magnesium 1.9 1.7 - 2.4 mg/dL   Sedimentation Rate    Collection Time: 12/01/20  5:52 AM   Result Value Ref Range    Sed Rate 4 0 - 30 mm   High sensitivity CRP    Collection Time: 12/01/20  5:52 AM   Result Value Ref Range    CRP High Sensitivity 108.6 (H) 0.0 - 5.0 mg/L   Protime-INR    Collection Time: 12/01/20  5:52 AM   Result Value Ref Range    Protime 45.3 (H) 12.3 - 14.9 sec    INR 4.9        ECG:     SR, with APCs / VPCs, 70s.11/23 now back in AFib 11/25    TELEMETRY:  Afib / Flutter , 105 range     ECHO:     1. Abnormal echocardiogram.    2. Normal left ventricular systolic function, LVEF 65 %.    3. Moderate Aortic valve stenosis with estimated aortic valve area 1.1    cm2.    4. Moderate AI, MR.    5. Moderate Left ventricular hypertrophy.    6. Moderate Bi atrial enlargement.    7. Normal pericardium. Bryson Cadena MD  [facility] Cardiologist      Electronically signed on 11/22/20 at 9:16 AM EST      -----    Dr Antoine Dewitt Last Office Note:    Subjective  PCP: Yanelis   Subjective: Mrs. Jose Armando Crandall follows here for mild-moderate aortic stenosis and permanent atrial fibrillation. She is on chronic anticoagulation with Coumadin managed by at [facility]. Last echocardiogram just 1 year ago with 65% ejection fraction mild aortic stenosis with mean gradient 9 mmHg peak velocity 1.8 m/s RVSP 43. [place] When last seen in March she had been doing quite well though does lead a fairly sedentary lifestyle. Lab work 6/30/2020 CBC normal INR that particular day 3.5 CMP remarkable only for albumin mildly decreased 3.3 glucose 102 creatinine normal 0.91 electrolytes     She denies chest pain, chest pressure, dyspnea, palpitations, lightheadedness, syncope, pnd and orthopnea. She has not recently been hospitalized or seen in an ER. Is mention that perhaps once a month she will have slight lightheadedness if she gets up too fast particularly if she has been sitting on the toilet. She does say however that she does not always maintain adequate hydration and does not always eat regularly. She has had no syncope. She has had no bleeding on anticoagulation and she has no sensation of her atrial fibrillation    Impression  1. Permanent atrial fibrillation: Rate is well controlled at rest. She has no symptoms of CHF to suggest excess rates with activity. She is had no bleeding on anticoagulation this is followed closely at [facility] clinic continue same. Although blood pressure is on the low side usually she says it is 110-120. I suspect some of the relatively low pressures are related to inadequate p.o. intake. Could consider decreasing metoprolol but she said she felt much better when I increased it from 50-75 because of better control of heart rate.  For now continue current medical regimen    2. Stenosis: Mild stenosis at echo 1 year ago the likelihood of progression to significant disease at her age is extraordinarily low and probably 0    3. Dizziness: Almost certainly due to volume depletion given that it only occurs once a month and she does describe relatively frequently actually poor p.o. intake and somewhat erratic eating habits. Courage to maintain hydration      Orders  Return visit 6 months   Urged her to maintain better hydration and to be sure she eats regularly    ROS  No mental status changes. Sometimes fatigue  No fever, chills or sweats. No hematuria. No abdominal pain, hematochezia, or hematemesis. No arthritic complaints. No focal weakness of limbs. No epistaxis. No cold intolerance. No claudication  No visual complaints    Physical exam  Blood pressure 100/80. Heart rate 70. Respiratory rate 12. She appears quite comfortable she is not diaphoretic. There is no elevation jugular venous distention. She has no abdominal pain. There is no edema    Over 50% of patient evaluation involved discussion of diagnosis, pathophysiology, treatment and care coordination. Chief Complaint  6 month follow up   Chief Complaint Free Text:   An interactive audio and video telecommunication system which permitted real time communication was used with the patient consent to complete today's visit. Active Problems   1. Anxiety disorder (300.00) (F41.9)   2. Aortic stenosis (424.1) (I35.0)   · 10/11/2019 echo. LVEF 65%. Moderate LVH. Biatrial enlargement. Moderate aortic      stenosis peak/mean gradients 13/9 mmHg. Peak velocity 1.8 m/s. RVSP 43 mmHg   MAY 2017 echocardiogram: LVEF 66%. Biatrial enlargement. Mild RV dilatation. Aortic      valve area 1.6 cm². Peak/mean gradient 14/7 mmHg. RVSP 31 mm   3. Asthma (493.90) (J45.909)   4. Chronic GERD (530.81) (K21.9)   5. Depression (311) (F32.9)   6. Dizziness (780.4) (R42)   7.  High risk medication use (V58.69) (Z79.899) 8. Hyperlipidemia (272.4) (E78.5)   9. Hypothyroidism (244.9) (E03.9)   10. Normal cardiac stress test   · 10/11/2019. Lexiscan perfusion. Normal perfusion. 66% LVEF   11. Overweight (278.02) (E66.3)   12. Permanent atrial fibrillation (427.31) (I48.21)   Initial diagnosis prior to 2014 paroxysmal at that time. 2017 was far more frequent and      antiarrhythmic therapy was considered but rate controlled preferred by patient. As of      September 2 019: Likely permanent. 13. Pulmonary fibrosis (515) (J84.10)   14. Right carotid bruit (785.9) (R09.89)   · 10/11/2019. Carotid duplex. Less than 50% bilateral stenosis with mild diffuse plaque   15. Former smoker (V93.25) (C03.017)    Family History   1. Family history of hypertension (V17.49) (Z82.49) : Mother   2. Family history of malignant neoplasm (V16.9) (Z80.9) : Brother    Social History   · Daily caffeine consumption, 4-5 servings a day   · Former smoker (V15.82) (E52.315)   · No illicit drug use   · Occasional alcohol consumption (V49.89) (Z78.9)    Surgical History  Denies any surgeries or colonoscopy since last appt. January, LPN     Current Meds   1. Metoprolol Succinate ER 25 MG Oral Tablet Extended Release 24 Hour; one tablet at   bedtime in addition to 50mg=75mg; Therapy: 47DDS8410 to (Evaluate:33Bou0980)  Requested for: 11IQC8049; Last   Rx:22Oct2019 Ordered   2. Metoprolol Succinate ER 50 MG Oral Tablet Extended Release 24 Hour; 1 tablet at   bedtime in addition to 25mg=75mg; Therapy: 27MZG6866 to (Evaluate:94Arz2573)  Requested for: 83Bng9086; Last   Rx:[id number] Ordered   3. traMADol HCl - 50 MG Oral Tablet; TAKE 1 TABLET 3 TIMES DAILY AS NEEDED; Therapy: 25NSF8345 to Recorded   4. Vitamin D3 50 MCG (2000 UT) Oral Tablet; take 1 tablet daily; Therapy: (Favian Kraus) to Recorded   5. Warfarin Sodium 2 MG Oral Tablet; take 1 tabelt daily as directed. managed by pcp office; Therapy: (Recorded:78Svx4424) to Recorded   6.  Warfarin Sodium 2 MG Oral Tablet; TAKE 1 TABLET DAILY; Therapy: 19EPT4544 to Recorded   7. Julianna Spells 250-50 MCG/DOSE Inhalation Aerosol Powder Breath Activated; INHALE 1   PUFF TWICE DAILY; Therapy: 91Iwc9578 to Recorded    Meds reviewed with daughter with list on her phone, via phone call. ANoble, LPN     Allergies   1. codeine   Hallucinations;; Updated By: Chris Powell; 10/22/2019 1:25:31 PM   2. TEGretol TABS   also had abnormal blood levels; Rash; Recorded By: Chris Powell; 10/22/2019 1:25:32 PM    Immunizations  PCV --- Phyliss Spell: 01-Oct-2019   PPSV --- Phyliss Spell: 01-Jan-2011     Pt does not get flu vaccines. ANoble, LPN     Vitals  Daughter says mother does not check bp's. Advised to check if she has a monitor available prior to tomorrow appt. ANoble, LPN     Assessment   1. Permanent atrial fibrillation (427.31) (I48.21)   2. Aortic stenosis (424.1) (I35.0)   3. Dizziness (780.4) (R42)    Plan   1. 6 month follow-up/call if interval problems. Evaluation and Treatment  Follow-up  Status:   Active  Requested for: 72ICF3733   2. Follow up per family physician. Evaluation and Treatment  Follow-up  Status: Active    Requested for: 47HZH6836   3. Access your medical record, request prescriptions, view laboratory and testing done in   our office, request appointments, view immunization records and message your provider   through our safe and secure patient portal. Ask a staff member how   to register or visit us at [email or web address] to get started today.;   Status:Complete;   Done: 08Oct2020   4. Continue same medications/treatment.; Status:Active; Requested for:08Oct2020;    5. Drink plenty of fluids.; Status:Active; Requested for:08Oct2020;    6. Eat a normal well-balanced diet.; Status:Active; Requested for:08Oct2020;    7. Please bring all medicines, vitamins, and herbal supplements with you when you come   to the office.; Status:Active; Requested for:08Oct2020;    8.  Please bring any lab results from other providers/physicians to your next appointment.;   Status:Active; Requested for:22Raw5175;    9. Prescriptions will not be filled unless you are compliant with your follow up appointments   or have a follow up appointments scheduled as per the instruction of your physician. Refills for medications should be requested at the time of your office visit.; Status:Active; Requested for:43Ign6659;    10. Thank you for being a patient at [facility]. Our goal is to    provide high quality medical care. Following today's visit, please check your email for an    invitation to complete our patient satisfaction survey. By sharing your feedback, you will    help us continue our mission to provide excellent medical care. Your participation in the    survey is voluntary and completely confidential. We appreciate your thoughts regarding    today's visit.; Status:Active; Requested for:59Zoq2249;    11. Tobacco use Hx Reported; Status:Complete;   Done: 26PXY7660    Discussion/Summary  As discussed today, please be sure to drink more fluids and eat regularly.      Signatures  Electronically signed by : Sarah Barrett LPN; Oct  7 9433  0:55JH EST                        Electronically signed by : Ana Woodard LPN; Oct  8 0150  1:07SH EST                        Electronically signed by : Adeline Flores M.D.; Oct  8 2020  6:08PM EST

## 2020-12-01 NOTE — FLOWSHEET NOTE
Report called to Erick Wilkinson at [facility].  Electronically signed by Socrates Ash RN on 12/1/2020 at 5:32 PM

## 2020-12-02 NOTE — FLOWSHEET NOTE
[facility]. Report called to Renan Abad at [facility]. Tele box was removed and given to monitor room, IV still intact. Patient's belongings (clothes, cellphone, dentures, home medications) have been given to [facility]. Patient's daughter Adalid Koo has been updated of the patient's transfer.    Electronically signed by Arnaldo Méndez RN on 12/1/2020 at 10:24 PM

## 2024-03-09 NOTE — PROGRESS NOTES
Subjective: The patient complains of severe  chronic progresive generalized weakness partially relieved by rest, IV hydration, PT, OT and meds and exacerbated by exertion and recent illness. I am concerned about her sacral decub her external female catheter risk for neurogenic bowel and bladder and extremely low blood pressure. Reviewed recent MD note, \"  Patient daughter and son-in-law at bedside. Patient is in and out of sleep. She is agreeable to daughter making decisions for her care. Dr Tayo Campbell notified of family wish for [facility]. Patient resting in bed with no signs of any acute distress at this time \". She seems to have atelectasis and pneumonia--much better since diuresis . ROS x10: The patient also complains of severely impaired mobility and activities of daily living. Otherwise no new problems with vision, hearing, nose, mouth, throat, dermal, cardiovascular, GI, , pulmonary, musculoskeletal, psychiatric or neurological. See Rehab consult on Rehab chart . Vital signs:  /75   Pulse 73   Temp 97.4 °F (36.3 °C) (Axillary)   Resp 19   Ht 5' 4\" (1.626 m)   Wt 124 lb 9.6 oz (56.5 kg) Comment: bed has been zeroed  LMP  (LMP Unknown)   SpO2 (!) 88%   BMI 21.39 kg/m²   I/O:   PO/Intake:    fair PO intake,   Was NPO-->clear liquids    Bowel/Bladder:    Severe constipation improving  General:  Patient is well developed, adequately nourished, non-obese and     well kempt. HEENT:    PERRLA, hearing intact to loud voice, external inspection of ear     and nose benign. Inspection of lips, tongue and gums benign  Musculoskeletal: No significant change in strength or tone. All joints stable. Inspection and palpation of digits and nails show no clubbing,       cyanosis or inflammatory conditions. Neuro/Psychiatric: Affect: flat-  Alert and oriented to self and     Situation .   No significant change in deep tendon reflexes or     sensation  Lungs:  Diminished, CTA-B. Respiration effort is normal at rest.     Heart:   S1 = S2,   Afib w RVR at  times. No loud murmurs. Abdomen:  Soft,  RUQ-tender, no enlargement of liver or spleen. Extremities:  No significant lower extremity edema or tenderness. Skin:    BUE bruises dt blood draws,  3  Sacral decubiti    Rehabilitation:    tx held dt low BP    Physical therapy: FIMS:  Bed Mobility: Scooting: Moderate assistance    Transfers: Sit to Stand: Moderate Assistance  Stand to sit: Moderate Assistance,  ,      FIMS:  ,  , Assessment: Pt exhibits decreased posture, increased SOB with minimal exertion, Well motivated. Occupational therapy: FIMS:   ,  , Assessment: Pt. is an 80year old woman from home alone who presents to [facility] with the above deficits which impact her ability to perform ADLs and IADLs. Pt would benefit from continued OT to maximize independence and safety with ADL tasks. Speech therapy: FIMS:        Lab/X-ray studies reviewed, analyzed and discussed with patient and staff:   No results found for this or any previous visit (from the past 24 hour(s)). Previous extensive, complex labs, notes and diagnostics reviewed and analyzed. ALLERGIES:    Allergies as of 11/22/2020 - Review Complete 11/22/2020   Allergen Reaction Noted    Carbamazepine and analogs  09/13/2010    Codeine  09/13/2010      (please also verify by checking MAR)     Discussed starting vitamins with her and her dtr. Complex Physical Medicine & Rehab Issues Assess & Plan:   1. Severe abnormality of gait and mobility and impaired self-care and ADL's secondary to progressive lumbar spinal stenosis and neurogenic bowel and bladder. Functional and medical status reassessed regarding patients ability to participate in therapies and patient found to be able to participate in  acute intensive comprehensive inpatient rehabilitation program including PT/OT to improve balance, ambulation, ADLs, and to improve the P/AROM.    It is my opinion that they will be able to tolerate 3 hours of therapy a day and benefit from it at an acute level. 2. Bowel fecal impaction constipation and Bladder dysfunction atonic bladder:  frequent toileting, ambulate to bathroom with assistance, check post void residuals. Check for C.difficile x1 if >2 loose stools in 24 hours, continue bowel & bladder program.  Monitor for UTI symptoms including lethargy and confusion  3. Severe thoracic and low back with multiple compression fractures and generalized OA pain: reassess pain every shift and prior to and after each therapy session, give prn  Tylenol, modalities prn in therapy, consider Lidoderm, K-pad prn.   4. Skin breakdown sacrum: Strict side-to-side turns consider plexus mattress continue pressure relief program.  Daily skin exams and reports from nursing. Plexus mattress. 5. Severe fatigue due to immobility and nutritional deficits: Add vitamin B12 vitamin D and CoQ10 titrate dosing and add protein supplementation with low carb content. Patient getting IV albumin. 6. Complex discharge planning:   Await medical stability-GI testing and BP correction--discussed at length with medical and cardio. Add Vit d, B12...coq10 etc. await final plans regarding her cardiac gallbladder interventions. Pt considring hospice. Complex Active General Medical Issues that complicate care Assess & Plan:     1. Active Problems:    Atrial fibrillation with rapid ventricular response (HCC)    Gait abnormality    DANNY (generalized anxiety disorder)    GERD (gastroesophageal reflux disease)    Hyperlipidemia    Major depressive disorder    Osteoporosis    Recurrent major depressive disorder, in full remission ([facility] [address]    Vitamin D deficiency    Sepsis due to urinary tract infection ([facility] [address]    Pressure injury of skin of contiguous region involving back, buttock, and hip    Constipation, acute    LFT elevation    Hyperkalemia  Resolved Problems:    * No resolved hospital problems.  *        Misa HILL Gume Alvarez D.O., PM&R     Attending    [address] Patient/Spouse